# Patient Record
Sex: MALE | Race: OTHER | HISPANIC OR LATINO | URBAN - METROPOLITAN AREA
[De-identification: names, ages, dates, MRNs, and addresses within clinical notes are randomized per-mention and may not be internally consistent; named-entity substitution may affect disease eponyms.]

---

## 2019-07-23 PROBLEM — Z00.00 ENCOUNTER FOR PREVENTIVE HEALTH EXAMINATION: Status: ACTIVE | Noted: 2019-07-23

## 2019-08-08 ENCOUNTER — OUTPATIENT (OUTPATIENT)
Dept: OUTPATIENT SERVICES | Facility: HOSPITAL | Age: 64
LOS: 1 days | Discharge: HOME | End: 2019-08-08

## 2019-08-08 DIAGNOSIS — E78.00 PURE HYPERCHOLESTEROLEMIA, UNSPECIFIED: ICD-10-CM

## 2019-08-08 DIAGNOSIS — E11.9 TYPE 2 DIABETES MELLITUS WITHOUT COMPLICATIONS: ICD-10-CM

## 2019-08-08 DIAGNOSIS — I10 ESSENTIAL (PRIMARY) HYPERTENSION: ICD-10-CM

## 2019-08-08 DIAGNOSIS — I73.9 PERIPHERAL VASCULAR DISEASE, UNSPECIFIED: ICD-10-CM

## 2019-08-13 ENCOUNTER — APPOINTMENT (OUTPATIENT)
Dept: CARDIOLOGY | Facility: CLINIC | Age: 64
End: 2019-08-13
Payer: MEDICAID

## 2019-08-13 VITALS
BODY MASS INDEX: 27.32 KG/M2 | HEIGHT: 66 IN | SYSTOLIC BLOOD PRESSURE: 122 MMHG | WEIGHT: 170 LBS | DIASTOLIC BLOOD PRESSURE: 70 MMHG

## 2019-08-13 PROCEDURE — 99203 OFFICE O/P NEW LOW 30 MIN: CPT

## 2019-08-13 PROCEDURE — 93000 ELECTROCARDIOGRAM COMPLETE: CPT

## 2019-08-13 NOTE — HISTORY OF PRESENT ILLNESS
[FreeTextEntry1] :  pt had acute myocardial infarction in 11/12/2018 was in [place] [facility]\par  pt had lad complete thrombosis and given two stents in proximal lad\par  in March 2019 as lvef was 30% aicd placed\par  pt denies chest pains but c/o severe exertional dyspnoea of less than ablok\par  meds reviewed\par  pt is not able to do any work due to exertional dyspnoea\par

## 2019-08-13 NOTE — PHYSICAL EXAM
[General Appearance - Well Developed] : well developed [Normal Appearance] : normal appearance [Well Groomed] : well groomed [General Appearance - Well Nourished] : well nourished [No Deformities] : no deformities [General Appearance - In No Acute Distress] : no acute distress [Arterial Pulses Normal] : the arterial pulses were normal [Heart Rate And Rhythm] : heart rate and rhythm were normal [Heart Sounds] : normal S1 and S2 [Edema] : no peripheral edema present [Murmurs] : no murmurs present

## 2019-08-13 NOTE — REASON FOR VISIT
[Initial Evaluation] : an initial evaluation of [Heart Failure] : congestive heart failure [Hyperlipidemia] : hyperlipidemia [Coronary Artery Disease] : coronary artery disease

## 2019-08-13 NOTE — REASON FOR VISIT
[Initial Evaluation] : an initial evaluation of [Heart Failure] : congestive heart failure [Coronary Artery Disease] : coronary artery disease [Hyperlipidemia] : hyperlipidemia

## 2019-08-13 NOTE — PHYSICAL EXAM
[General Appearance - Well Developed] : well developed [Normal Appearance] : normal appearance [Well Groomed] : well groomed [General Appearance - Well Nourished] : well nourished [No Deformities] : no deformities [General Appearance - In No Acute Distress] : no acute distress [Arterial Pulses Normal] : the arterial pulses were normal [Heart Sounds] : normal S1 and S2 [Heart Rate And Rhythm] : heart rate and rhythm were normal [Edema] : no peripheral edema present [Murmurs] : no murmurs present

## 2019-08-13 NOTE — HISTORY OF PRESENT ILLNESS
[FreeTextEntry1] :  pt had acute myocardial infarction in 11/12/2018 was in [place] [facility]\par  pt had lad complete thrombosis and given two stents in proximal lad\par  in March 2019 as lvef was 30% aicd placed\par  pt denies chest pains but c/o severe exertional dyspnoea of less than ablok\par  meds reviewed\par  pt is not able to do any work due to exertional dyspnoea\par

## 2019-08-13 NOTE — ASSESSMENT
[FreeTextEntry1] :  pt s/p anterior wall mi and 2 stents in lad\par  s/p aicd for reduced lvef\par  meds reviewed\par  pt has dry cough will d/c lisinopril and start on valsartan 80 mg po daily\par  reduce amlodipine to 5 mg po daily\par  blood work from 8/19 reviewed

## 2019-08-27 ENCOUNTER — APPOINTMENT (OUTPATIENT)
Dept: CARDIOLOGY | Facility: CLINIC | Age: 64
End: 2019-08-27
Payer: MEDICAID

## 2019-08-27 PROCEDURE — 93306 TTE W/DOPPLER COMPLETE: CPT

## 2019-08-29 ENCOUNTER — TRANSCRIPTION ENCOUNTER (OUTPATIENT)
Age: 64
End: 2019-08-29

## 2019-09-14 ENCOUNTER — OUTPATIENT (OUTPATIENT)
Dept: OUTPATIENT SERVICES | Facility: HOSPITAL | Age: 64
LOS: 1 days | Discharge: HOME | End: 2019-09-14

## 2019-09-14 DIAGNOSIS — E61.1 IRON DEFICIENCY: ICD-10-CM

## 2019-09-14 DIAGNOSIS — I10 ESSENTIAL (PRIMARY) HYPERTENSION: ICD-10-CM

## 2019-09-14 DIAGNOSIS — E11.9 TYPE 2 DIABETES MELLITUS WITHOUT COMPLICATIONS: ICD-10-CM

## 2019-09-14 DIAGNOSIS — E55.9 VITAMIN D DEFICIENCY, UNSPECIFIED: ICD-10-CM

## 2019-09-14 DIAGNOSIS — E78.00 PURE HYPERCHOLESTEROLEMIA, UNSPECIFIED: ICD-10-CM

## 2019-09-30 ENCOUNTER — APPOINTMENT (OUTPATIENT)
Dept: UROLOGY | Facility: CLINIC | Age: 64
End: 2019-09-30
Payer: MEDICAID

## 2019-09-30 VITALS
BODY MASS INDEX: 27.64 KG/M2 | WEIGHT: 172 LBS | HEIGHT: 66 IN | SYSTOLIC BLOOD PRESSURE: 117 MMHG | HEART RATE: 55 BPM | DIASTOLIC BLOOD PRESSURE: 82 MMHG

## 2019-09-30 DIAGNOSIS — Z82.49 FAMILY HISTORY OF ISCHEMIC HEART DISEASE AND OTHER DISEASES OF THE CIRCULATORY SYSTEM: ICD-10-CM

## 2019-09-30 DIAGNOSIS — Z83.3 FAMILY HISTORY OF DIABETES MELLITUS: ICD-10-CM

## 2019-09-30 DIAGNOSIS — N50.3 CYST OF EPIDIDYMIS: ICD-10-CM

## 2019-09-30 PROCEDURE — 99203 OFFICE O/P NEW LOW 30 MIN: CPT

## 2019-09-30 NOTE — PHYSICAL EXAM
[General Appearance - Well Developed] : well developed [Normal Appearance] : normal appearance [General Appearance - Well Nourished] : well nourished [Well Groomed] : well groomed [General Appearance - In No Acute Distress] : no acute distress [Edema] : no peripheral edema [Respiration, Rhythm And Depth] : normal respiratory rhythm and effort [Abdomen Soft] : soft [Exaggerated Use Of Accessory Muscles For Inspiration] : no accessory muscle use [Abdomen Tenderness] : non-tender [Costovertebral Angle Tenderness] : no ~M costovertebral angle tenderness [Urethral Meatus] : meatus normal [Testes Mass (___cm)] : there were no testicular masses [FreeTextEntry1] : bilateral scrotal swelling likely hydrocele [Normal Station and Gait] : the gait and station were normal for the patient's age [] : no rash [No Focal Deficits] : no focal deficits [Affect] : the affect was normal [Oriented To Time, Place, And Person] : oriented to person, place, and time [Mood] : the mood was normal [Not Anxious] : not anxious [No Palpable Adenopathy] : no palpable adenopathy

## 2019-09-30 NOTE — ASSESSMENT
[FreeTextEntry1] : This is a 64 year male who presents for evaluation of bilateral hydrocele -- larger on the left - loculated large\par developed after hernia surgery on 2005\par \par Brings scrotal sonogram from [place] from March 2019\par --images reviewed by me\par -- loculated large left hydrocele\par bilateral epdidiymal cysts\par right collection -- epididymal cyst vs hydrocele\par

## 2019-09-30 NOTE — HISTORY OF PRESENT ILLNESS
[FreeTextEntry1] : This is a 64 year male who presents for evaluation of bilateral hydrocele -- larger on the left - loculated large\par developed after hernia surgery on 2005\par \par Brings scrotal sonogram from [place] from March 2019\par --images reviewed by me\par -- loculated large left hydrocele\par bilateral epdidiymal cysts\par right collection -- epididymal cyst vs hydrocele\par \par Nov 2018--\par two cardiac stents

## 2019-11-15 ENCOUNTER — APPOINTMENT (OUTPATIENT)
Dept: CARDIOLOGY | Facility: CLINIC | Age: 64
End: 2019-11-15
Payer: MEDICAID

## 2019-11-15 VITALS
HEIGHT: 66 IN | WEIGHT: 180 LBS | DIASTOLIC BLOOD PRESSURE: 65 MMHG | SYSTOLIC BLOOD PRESSURE: 123 MMHG | BODY MASS INDEX: 28.93 KG/M2

## 2019-11-15 DIAGNOSIS — I24.8 OTHER FORMS OF ACUTE ISCHEMIC HEART DISEASE: ICD-10-CM

## 2019-11-15 PROCEDURE — 99213 OFFICE O/P EST LOW 20 MIN: CPT

## 2019-11-15 PROCEDURE — 93000 ELECTROCARDIOGRAM COMPLETE: CPT

## 2019-11-15 NOTE — ASSESSMENT
[FreeTextEntry1] :  e c h o showed 34 % lvef\par  pt had aicd check by DR Loja his report noted\par  no anginal sx\par  mild exertional dyspnoea \par  meds reviewed\par  e k g nsr no arrhythmias noted no new st changes een\par  blood work done by pmd

## 2019-11-15 NOTE — PHYSICAL EXAM
[General Appearance - Well Developed] : well developed [Normal Appearance] : normal appearance [Well Groomed] : well groomed [General Appearance - Well Nourished] : well nourished [No Deformities] : no deformities [General Appearance - In No Acute Distress] : no acute distress [Heart Rate And Rhythm] : heart rate and rhythm were normal [Heart Sounds] : normal S1 and S2 [Murmurs] : no murmurs present

## 2019-12-05 ENCOUNTER — OUTPATIENT (OUTPATIENT)
Dept: OUTPATIENT SERVICES | Facility: HOSPITAL | Age: 64
LOS: 1 days | Discharge: HOME | End: 2019-12-05

## 2019-12-05 DIAGNOSIS — E61.1 IRON DEFICIENCY: ICD-10-CM

## 2019-12-05 DIAGNOSIS — E78.00 PURE HYPERCHOLESTEROLEMIA, UNSPECIFIED: ICD-10-CM

## 2019-12-05 DIAGNOSIS — E55.9 VITAMIN D DEFICIENCY, UNSPECIFIED: ICD-10-CM

## 2019-12-05 DIAGNOSIS — E11.9 TYPE 2 DIABETES MELLITUS WITHOUT COMPLICATIONS: ICD-10-CM

## 2019-12-05 DIAGNOSIS — H90.5 UNSPECIFIED SENSORINEURAL HEARING LOSS: ICD-10-CM

## 2019-12-05 DIAGNOSIS — R42 DIZZINESS AND GIDDINESS: ICD-10-CM

## 2019-12-05 DIAGNOSIS — I10 ESSENTIAL (PRIMARY) HYPERTENSION: ICD-10-CM

## 2020-01-07 ENCOUNTER — APPOINTMENT (OUTPATIENT)
Dept: UROLOGY | Facility: CLINIC | Age: 65
End: 2020-01-07
Payer: MEDICAID

## 2020-01-07 PROCEDURE — 99214 OFFICE O/P EST MOD 30 MIN: CPT

## 2020-01-17 ENCOUNTER — MESSAGE (OUTPATIENT)
Age: 65
End: 2020-01-17

## 2020-01-30 ENCOUNTER — APPOINTMENT (OUTPATIENT)
Dept: UROLOGY | Facility: CLINIC | Age: 65
End: 2020-01-30
Payer: COMMERCIAL

## 2020-01-30 VITALS — BODY MASS INDEX: 28.93 KG/M2 | WEIGHT: 180 LBS | HEIGHT: 66 IN

## 2020-01-30 LAB
BILIRUB UR QL STRIP: NORMAL
CLARITY UR: CLEAR
COLLECTION METHOD: NORMAL
GLUCOSE UR-MCNC: NORMAL
HCG UR QL: NORMAL EU/DL
HGB UR QL STRIP.AUTO: NORMAL
KETONES UR-MCNC: NORMAL
LEUKOCYTE ESTERASE UR QL STRIP: NORMAL
NITRITE UR QL STRIP: NORMAL
PH UR STRIP: 5
PROT UR STRIP-MCNC: NORMAL
SP GR UR STRIP: 1.01

## 2020-01-30 PROCEDURE — 81003 URINALYSIS AUTO W/O SCOPE: CPT | Mod: QW

## 2020-01-30 PROCEDURE — 99214 OFFICE O/P EST MOD 30 MIN: CPT

## 2020-02-03 LAB — BACTERIA UR CULT: NORMAL

## 2020-02-06 DIAGNOSIS — R30.0 DYSURIA: ICD-10-CM

## 2020-02-12 ENCOUNTER — LABORATORY RESULT (OUTPATIENT)
Age: 65
End: 2020-02-12

## 2020-02-18 ENCOUNTER — LABORATORY RESULT (OUTPATIENT)
Age: 65
End: 2020-02-18

## 2020-02-18 ENCOUNTER — APPOINTMENT (OUTPATIENT)
Dept: UROLOGY | Facility: CLINIC | Age: 65
End: 2020-02-18
Payer: COMMERCIAL

## 2020-02-18 PROCEDURE — 76872 US TRANSRECTAL: CPT

## 2020-02-18 PROCEDURE — 93975 VASCULAR STUDY: CPT

## 2020-02-18 PROCEDURE — 55700: CPT

## 2020-02-28 ENCOUNTER — APPOINTMENT (OUTPATIENT)
Dept: UROLOGY | Facility: CLINIC | Age: 65
End: 2020-02-28
Payer: COMMERCIAL

## 2020-02-28 PROCEDURE — 99213 OFFICE O/P EST LOW 20 MIN: CPT

## 2020-03-27 ENCOUNTER — APPOINTMENT (OUTPATIENT)
Dept: CARDIOLOGY | Facility: CLINIC | Age: 65
End: 2020-03-27

## 2020-05-12 ENCOUNTER — APPOINTMENT (OUTPATIENT)
Dept: CARDIOLOGY | Facility: CLINIC | Age: 65
End: 2020-05-12

## 2020-06-10 ENCOUNTER — RX RENEWAL (OUTPATIENT)
Age: 65
End: 2020-06-10

## 2020-07-06 ENCOUNTER — FORM ENCOUNTER (OUTPATIENT)
Age: 65
End: 2020-07-06

## 2020-09-08 ENCOUNTER — APPOINTMENT (OUTPATIENT)
Dept: UROLOGY | Facility: CLINIC | Age: 65
End: 2020-09-08
Payer: COMMERCIAL

## 2020-09-08 VITALS
BODY MASS INDEX: 28.93 KG/M2 | SYSTOLIC BLOOD PRESSURE: 110 MMHG | HEART RATE: 62 BPM | HEIGHT: 66 IN | WEIGHT: 180 LBS | TEMPERATURE: 98.2 F | DIASTOLIC BLOOD PRESSURE: 76 MMHG

## 2020-09-08 PROCEDURE — 81003 URINALYSIS AUTO W/O SCOPE: CPT | Mod: QW

## 2020-09-08 PROCEDURE — 99213 OFFICE O/P EST LOW 20 MIN: CPT

## 2020-10-01 NOTE — PHYSICAL EXAM
[General Appearance - Well Developed] : well developed [General Appearance - Well Nourished] : well nourished [Well Groomed] : well groomed [Normal Appearance] : normal appearance [General Appearance - In No Acute Distress] : no acute distress [Abdomen Soft] : soft [Abdomen Tenderness] : non-tender [Costovertebral Angle Tenderness] : no ~M costovertebral angle tenderness [Urethral Meatus] : meatus normal [Penis Abnormality] : normal uncircumcised penis [Anus Abnormality] : the anus and perineum were normal [Rectal Exam - Rectum] : digital rectal exam was normal [Prostate Tenderness] : the prostate was not tender [Prostate Size ___ gm] : prostate size [unfilled] gm [No Prostate Nodules] : no prostate nodules [Skin Color & Pigmentation] : normal skin color and pigmentation [Edema] : no peripheral edema [Respiration, Rhythm And Depth] : normal respiratory rhythm and effort [] : no respiratory distress [Exaggerated Use Of Accessory Muscles For Inspiration] : no accessory muscle use [Oriented To Time, Place, And Person] : oriented to person, place, and time [Mood] : the mood was normal [Affect] : the affect was normal [Not Anxious] : not anxious [Normal Station and Gait] : the gait and station were normal for the patient's age [No Focal Deficits] : no focal deficits [No Palpable Adenopathy] : no palpable adenopathy [FreeTextEntry1] : large right hydrocele, very large left hydrocele- extends to ext ing ring-testis not palpable //sulcus +, smooth (from last visit)

## 2020-10-01 NOTE — HISTORY OF PRESENT ILLNESS
[Nocturia] : nocturia [Urinary Frequency] : urinary frequency [None] : None [FreeTextEntry1] : 65  y.o male with a h/o elevated PSA\par s/p Prostate Biopsy 2/18/20  == BPH  , chronic inflammation \par doing ok\par otherwise denies fevers or constitutional  symptoms \par 2/ 20 TRUS =  89 gm /no nodules // nonvasc.\par 1/20 psa =  9.1 %free = 26 \par 8/ 20 psa = 9.2   %bruno = 24   PSAD = .10\par \par \par  [Straining] : no straining [Weak Stream] : no weak stream [Hematuria - Gross] : no gross hematuria [Fever] : no fever [Anorexia] : no anorexia

## 2020-10-01 NOTE — ASSESSMENT
[FreeTextEntry1] : 1. elevated PSA with nl %free-  s/p neg prostate biopsy 2/2020\par 2. BPH \par 3. Large left hydrocele , mod right hydrocele\par \par \par -repeat PSA in 6 months with office visit ******** April 2021

## 2020-11-09 ENCOUNTER — FORM ENCOUNTER (OUTPATIENT)
Age: 65
End: 2020-11-09

## 2020-11-11 ENCOUNTER — OUTPATIENT (OUTPATIENT)
Dept: OUTPATIENT SERVICES | Facility: HOSPITAL | Age: 65
LOS: 1 days | Discharge: HOME | End: 2020-11-11

## 2020-11-11 DIAGNOSIS — K02.53 DENTAL CARIES ON PIT AND FISSURE SURFACE PENETRATING INTO PULP: ICD-10-CM

## 2021-01-20 ENCOUNTER — FORM ENCOUNTER (OUTPATIENT)
Age: 66
End: 2021-01-20

## 2021-02-14 ENCOUNTER — EMERGENCY (EMERGENCY)
Facility: HOSPITAL | Age: 66
LOS: 0 days | Discharge: HOME | End: 2021-02-14
Attending: EMERGENCY MEDICINE | Admitting: EMERGENCY MEDICINE
Payer: MEDICAID

## 2021-02-14 VITALS
HEIGHT: 65 IN | SYSTOLIC BLOOD PRESSURE: 138 MMHG | WEIGHT: 177.03 LBS | DIASTOLIC BLOOD PRESSURE: 83 MMHG | TEMPERATURE: 99 F | RESPIRATION RATE: 18 BRPM | OXYGEN SATURATION: 98 % | HEART RATE: 76 BPM

## 2021-02-14 DIAGNOSIS — R42 DIZZINESS AND GIDDINESS: ICD-10-CM

## 2021-02-14 LAB
ANION GAP SERPL CALC-SCNC: 15 MMOL/L — HIGH (ref 7–14)
BASOPHILS # BLD AUTO: 0.09 K/UL — SIGNIFICANT CHANGE UP (ref 0–0.2)
BASOPHILS NFR BLD AUTO: 0.9 % — SIGNIFICANT CHANGE UP (ref 0–1)
BUN SERPL-MCNC: 34 MG/DL — HIGH (ref 10–20)
CALCIUM SERPL-MCNC: 9.4 MG/DL — SIGNIFICANT CHANGE UP (ref 8.5–10.1)
CHLORIDE SERPL-SCNC: 103 MMOL/L — SIGNIFICANT CHANGE UP (ref 98–110)
CO2 SERPL-SCNC: 22 MMOL/L — SIGNIFICANT CHANGE UP (ref 17–32)
CREAT SERPL-MCNC: 1.6 MG/DL — HIGH (ref 0.7–1.5)
EOSINOPHIL # BLD AUTO: 0.23 K/UL — SIGNIFICANT CHANGE UP (ref 0–0.7)
EOSINOPHIL NFR BLD AUTO: 2.3 % — SIGNIFICANT CHANGE UP (ref 0–8)
GLUCOSE SERPL-MCNC: 173 MG/DL — HIGH (ref 70–99)
HCT VFR BLD CALC: 43.7 % — SIGNIFICANT CHANGE UP (ref 42–52)
HGB BLD-MCNC: 14.6 G/DL — SIGNIFICANT CHANGE UP (ref 14–18)
IMM GRANULOCYTES NFR BLD AUTO: 0.3 % — SIGNIFICANT CHANGE UP (ref 0.1–0.3)
LYMPHOCYTES # BLD AUTO: 1.17 K/UL — LOW (ref 1.2–3.4)
LYMPHOCYTES # BLD AUTO: 11.5 % — LOW (ref 20.5–51.1)
MCHC RBC-ENTMCNC: 29.4 PG — SIGNIFICANT CHANGE UP (ref 27–31)
MCHC RBC-ENTMCNC: 33.4 G/DL — SIGNIFICANT CHANGE UP (ref 32–37)
MCV RBC AUTO: 88.1 FL — SIGNIFICANT CHANGE UP (ref 80–94)
MONOCYTES # BLD AUTO: 0.44 K/UL — SIGNIFICANT CHANGE UP (ref 0.1–0.6)
MONOCYTES NFR BLD AUTO: 4.3 % — SIGNIFICANT CHANGE UP (ref 1.7–9.3)
NEUTROPHILS # BLD AUTO: 8.2 K/UL — HIGH (ref 1.4–6.5)
NEUTROPHILS NFR BLD AUTO: 80.7 % — HIGH (ref 42.2–75.2)
NRBC # BLD: 0 /100 WBCS — SIGNIFICANT CHANGE UP (ref 0–0)
PLATELET # BLD AUTO: 268 K/UL — SIGNIFICANT CHANGE UP (ref 130–400)
POTASSIUM SERPL-MCNC: 4.3 MMOL/L — SIGNIFICANT CHANGE UP (ref 3.5–5)
POTASSIUM SERPL-SCNC: 4.3 MMOL/L — SIGNIFICANT CHANGE UP (ref 3.5–5)
RBC # BLD: 4.96 M/UL — SIGNIFICANT CHANGE UP (ref 4.7–6.1)
RBC # FLD: 14.2 % — SIGNIFICANT CHANGE UP (ref 11.5–14.5)
SODIUM SERPL-SCNC: 140 MMOL/L — SIGNIFICANT CHANGE UP (ref 135–146)
WBC # BLD: 10.16 K/UL — SIGNIFICANT CHANGE UP (ref 4.8–10.8)
WBC # FLD AUTO: 10.16 K/UL — SIGNIFICANT CHANGE UP (ref 4.8–10.8)

## 2021-02-14 PROCEDURE — 99285 EMERGENCY DEPT VISIT HI MDM: CPT

## 2021-02-14 PROCEDURE — 70450 CT HEAD/BRAIN W/O DYE: CPT | Mod: 26

## 2021-02-14 PROCEDURE — 93010 ELECTROCARDIOGRAM REPORT: CPT

## 2021-02-14 RX ORDER — MECLIZINE HCL 12.5 MG
1 TABLET ORAL
Qty: 21 | Refills: 0
Start: 2021-02-14 | End: 2021-02-20

## 2021-02-14 RX ORDER — MECLIZINE HCL 12.5 MG
50 TABLET ORAL ONCE
Refills: 0 | Status: COMPLETED | OUTPATIENT
Start: 2021-02-14 | End: 2021-02-14

## 2021-02-14 RX ADMIN — Medication 50 MILLIGRAM(S): at 14:26

## 2021-02-14 NOTE — ED ADULT NURSE NOTE - OBJECTIVE STATEMENT
patient presents to the ed with complaints of dizziness that started a few days ago. pt denies blurry vision. states couldn't take feeling this way anymoer today

## 2021-02-14 NOTE — ED PROVIDER NOTE - CLINICAL SUMMARY MEDICAL DECISION MAKING FREE TEXT BOX
pt here for eval of dizzyness. sx's often worse with position. CT Head done to exclude acute intracranial pathology. ct wnl. pt well appearing, normal neuro exam. given outpt f/u and meclizine. will dc

## 2021-02-14 NOTE — ED PROVIDER NOTE - PROGRESS NOTE DETAILS
ATTENDING NOTE:   66 yo M PMH CAD, DM here for eval of dizziness x2 days. Reports it usually happens when he gets up. No weakness or numbness. No ear pain. No other sx.  On exam: CON: ao x 3, HENMT: clear oropharynx,  neck supple,  CV: rrr, equal pulses b/l, RESP: cta b/l, GI:  soft, nontender, no rebound, no guarding, SKIN: no rash, MSK: no deformities, NEURO: no gross motor or sensory deficit Psychiatric: appropriate mood, appropriate affect, PERRL, EOMI. Normal gait.CN II-XII intact. 5/5 strength sensation intact. Finger to nose normal.    I&P: Dizziness, however, normal exam. [place] obtain head CT and labs pt without sx's walked to bathroom. Pt reports that when he walked  he turned his head and felt dizzy briefly. will dc on meclizine. ATTENDING NOTE:   64 yo M PMH CAD, DM here for eval of dizziness x2 days. Reports it usually happens when he gets up. No weakness or numbness. No ear pain. No other sx.  On exam: CON: ao x 3, HENMT:  neck supple,  CV: rrr, equal pulses b/l, RESP: cta b/l, GI:  soft, nontender, no rebound, no guarding, SKIN: no rash, MSK: no deformities, NEURO: no gross motor or sensory deficit Psychiatric: appropriate mood, appropriate affect, PERRL, EOMI. Normal gait.CN II-XII intact. 5/5 strength sensation intact. Finger to nose normal.    I&P: Dizziness, however, normal exam. [place] obtain head CT and labs

## 2021-02-14 NOTE — ED PROVIDER NOTE - PHYSICAL EXAMINATION
CONST: Well appearing in NAD  EYES: PERRL, EOMI, Sclera and conjunctiva clear.   ENT: No nasal discharge. TM's clear B/L without drainage. Oropharynx normal appearing, no erythema or exudates. Uvula midline.  NECK: Non-tender, no meningeal signs  CARD: Normal S1 S2; Normal rate and rhythm  RESP: Equal BS B/L, No wheezes, rhonchi or rales. No distress  GI: Soft, non-tender, non-distended.  MS: Normal ROM in all extremities. No midline spinal tenderness.  SKIN: Warm, dry, no acute rashes. Good turgor  NEURO: A&Ox3, No focal deficits. Strength 5/5 with no sensory deficits. Steady gait. no nystagmus. neg Romberg, Neg supinator drift. no dysmetria

## 2021-02-14 NOTE — ED PROVIDER NOTE - PATIENT PORTAL LINK FT
You can access the FollowMyHealth Patient Portal offered by [facility] by registering at the following website: [email or web address]. By joining [facility]’s FollowMyHealth portal, you will also be able to view your health information using other applications (apps) compatible with our system.

## 2021-02-14 NOTE — ED ADULT TRIAGE NOTE - CHIEF COMPLAINT QUOTE
pt states the past 2 days he has been dizzy and nauseous. states it is worse when he bends down or rolls over in bed.

## 2021-02-14 NOTE — ED ADULT NURSE NOTE - NS ED NOTE ABUSE RESPONSE YN
Airway  Urgency: elective    Date/Time: 12/30/2020 10:15 AM  Airway not difficult    General Information and Staff    Patient location during procedure: OR    Indications and Patient Condition  Indications for airway management: airway protection    Preoxygenated: yes  MILS maintained throughout  Mask difficulty assessment: 1 - vent by mask    Final Airway Details  Final airway type: endotracheal airway      Successful airway: ETT  Cuffed: yes   Successful intubation technique: direct laryngoscopy  Endotracheal tube insertion site: oral  Blade: Ashley  Blade size: 3  ETT size (mm): 7.0  Cormack-Lehane Classification: grade I - full view of glottis  Placement verified by: chest auscultation and capnometry   Measured from: lips  ETT/EBT  to lips (cm): 20  Number of attempts at approach: 1  Assessment: lips, teeth, and gum same as pre-op and atraumatic intubation               Yes

## 2021-02-14 NOTE — ED PROVIDER NOTE - OBJECTIVE STATEMENT
dizziness x 1 day. Room spinning. worse with change in position. Assoc with nausea. Denies HA, tinnitus, ear pain, hearing loss, CP, SOB, abd pain fever, diarrhea. Pt has a hx of CAD with stents and DM.

## 2021-02-14 NOTE — ED PROVIDER NOTE - NS ED ROS FT
CONST: No fever, chills or bodyaches  EYES: No pain, redness, drainage or visual changes.  ENT: No ear pain or discharge, nasal discharge or congestion. No sore throat  CARD: No chest pain, palpitations  RESP: No SOB, cough, hemoptysis. No hx of asthma or COPD  GI: No abdominal pain  MS: No joint pain, back pain or extremity pain/injury  SKIN: No rashes  NEURO: No headache, (+)dizziness, No paresthesias or LOC  : No dysuria

## 2021-02-14 NOTE — ED ADULT NURSE REASSESSMENT NOTE - NS ED NURSE REASSESS COMMENT FT1
trulicity 1.5mg/0.5ml pen 1 a week, valsartan 80mg 1 a day, furosemide 40mg 1 a day, coreg 12.5mg twice a day, asa 81mg one a day, ezetimibe 10mg 1 a day, alloipurinol 100mg twice a day, atorvastatin 80mg 1 a day. heart attach 11/2018

## 2021-03-01 ENCOUNTER — OUTPATIENT (OUTPATIENT)
Dept: OUTPATIENT SERVICES | Facility: HOSPITAL | Age: 66
LOS: 1 days | Discharge: HOME | End: 2021-03-01

## 2021-03-02 DIAGNOSIS — K02.63 DENTAL CARIES ON SMOOTH SURFACE PENETRATING INTO PULP: ICD-10-CM

## 2021-03-25 ENCOUNTER — FORM ENCOUNTER (OUTPATIENT)
Age: 66
End: 2021-03-25

## 2021-04-07 ENCOUNTER — OUTPATIENT (OUTPATIENT)
Dept: OUTPATIENT SERVICES | Facility: HOSPITAL | Age: 66
LOS: 1 days | Discharge: HOME | End: 2021-04-07

## 2021-04-15 ENCOUNTER — APPOINTMENT (OUTPATIENT)
Dept: UROLOGY | Facility: CLINIC | Age: 66
End: 2021-04-15
Payer: COMMERCIAL

## 2021-04-15 VITALS — HEIGHT: 66 IN | WEIGHT: 180 LBS | BODY MASS INDEX: 28.93 KG/M2 | TEMPERATURE: 98 F

## 2021-04-15 PROCEDURE — 99214 OFFICE O/P EST MOD 30 MIN: CPT

## 2021-04-15 PROCEDURE — 99072 ADDL SUPL MATRL&STAF TM PHE: CPT

## 2021-05-17 ENCOUNTER — FORM ENCOUNTER (OUTPATIENT)
Age: 66
End: 2021-05-17

## 2021-06-10 ENCOUNTER — FORM ENCOUNTER (OUTPATIENT)
Age: 66
End: 2021-06-10

## 2021-06-20 ENCOUNTER — FORM ENCOUNTER (OUTPATIENT)
Age: 66
End: 2021-06-20

## 2021-12-09 ENCOUNTER — APPOINTMENT (OUTPATIENT)
Dept: UROLOGY | Facility: CLINIC | Age: 66
End: 2021-12-09
Payer: COMMERCIAL

## 2021-12-09 PROCEDURE — 99213 OFFICE O/P EST LOW 20 MIN: CPT

## 2021-12-19 ENCOUNTER — FORM ENCOUNTER (OUTPATIENT)
Age: 66
End: 2021-12-19

## 2022-03-21 ENCOUNTER — NON-APPOINTMENT (OUTPATIENT)
Age: 67
End: 2022-03-21

## 2022-03-22 ENCOUNTER — APPOINTMENT (OUTPATIENT)
Dept: UROLOGY | Facility: CLINIC | Age: 67
End: 2022-03-22
Payer: MEDICARE

## 2022-03-22 VITALS — WEIGHT: 180 LBS | HEIGHT: 66 IN | BODY MASS INDEX: 28.93 KG/M2

## 2022-03-22 LAB
BILIRUB UR QL STRIP: NORMAL
COLLECTION METHOD: NORMAL
GLUCOSE UR-MCNC: NORMAL
HCG UR QL: 0.2 EU/DL
HGB UR QL STRIP.AUTO: NORMAL
KETONES UR-MCNC: NORMAL
LEUKOCYTE ESTERASE UR QL STRIP: NORMAL
NITRITE UR QL STRIP: NORMAL
PH UR STRIP: 5.5
PROT UR STRIP-MCNC: NORMAL
SP GR UR STRIP: 1.03

## 2022-03-22 PROCEDURE — 99214 OFFICE O/P EST MOD 30 MIN: CPT

## 2022-03-22 PROCEDURE — 81003 URINALYSIS AUTO W/O SCOPE: CPT | Mod: QW

## 2022-05-24 ENCOUNTER — OUTPATIENT (OUTPATIENT)
Dept: OUTPATIENT SERVICES | Facility: HOSPITAL | Age: 67
LOS: 1 days | Discharge: HOME | End: 2022-05-24
Payer: MEDICARE

## 2022-05-24 ENCOUNTER — RESULT REVIEW (OUTPATIENT)
Age: 67
End: 2022-05-24

## 2022-05-24 DIAGNOSIS — R97.20 ELEVATED PROSTATE SPECIFIC ANTIGEN [PSA]: ICD-10-CM

## 2022-05-24 PROCEDURE — 72197 MRI PELVIS W/O & W/DYE: CPT | Mod: 26

## 2022-06-07 ENCOUNTER — APPOINTMENT (OUTPATIENT)
Dept: UROLOGY | Facility: CLINIC | Age: 67
End: 2022-06-07
Payer: MEDICARE

## 2022-06-07 VITALS — HEIGHT: 65 IN | BODY MASS INDEX: 29.16 KG/M2 | WEIGHT: 175 LBS

## 2022-06-07 PROCEDURE — 99213 OFFICE O/P EST LOW 20 MIN: CPT

## 2022-06-07 PROCEDURE — 81003 URINALYSIS AUTO W/O SCOPE: CPT | Mod: QW

## 2022-06-26 ENCOUNTER — FORM ENCOUNTER (OUTPATIENT)
Age: 67
End: 2022-06-26

## 2022-06-27 VITALS
OXYGEN SATURATION: 98 % | DIASTOLIC BLOOD PRESSURE: 70 MMHG | WEIGHT: 175 LBS | HEART RATE: 70 BPM | BODY MASS INDEX: 29.16 KG/M2 | SYSTOLIC BLOOD PRESSURE: 120 MMHG | RESPIRATION RATE: 15 BRPM | HEIGHT: 65 IN

## 2022-08-31 ENCOUNTER — NON-APPOINTMENT (OUTPATIENT)
Age: 67
End: 2022-08-31

## 2022-09-08 ENCOUNTER — APPOINTMENT (OUTPATIENT)
Dept: UROLOGY | Facility: CLINIC | Age: 67
End: 2022-09-08

## 2022-09-08 VITALS — WEIGHT: 178 LBS | BODY MASS INDEX: 29.66 KG/M2 | HEIGHT: 65 IN

## 2022-09-08 PROCEDURE — 99213 OFFICE O/P EST LOW 20 MIN: CPT

## 2022-09-15 ENCOUNTER — NON-APPOINTMENT (OUTPATIENT)
Age: 67
End: 2022-09-15

## 2022-12-20 ENCOUNTER — APPOINTMENT (OUTPATIENT)
Dept: CARDIOLOGY | Facility: CLINIC | Age: 67
End: 2022-12-20

## 2022-12-20 ENCOUNTER — APPOINTMENT (OUTPATIENT)
Dept: CARDIOLOGY | Facility: CLINIC | Age: 67
End: 2022-12-20
Payer: MEDICARE

## 2022-12-20 VITALS
WEIGHT: 184 LBS | BODY MASS INDEX: 30.66 KG/M2 | DIASTOLIC BLOOD PRESSURE: 78 MMHG | SYSTOLIC BLOOD PRESSURE: 138 MMHG | HEIGHT: 65 IN

## 2022-12-20 PROCEDURE — 93325 DOPPLER ECHO COLOR FLOW MAPG: CPT

## 2022-12-20 PROCEDURE — 93320 DOPPLER ECHO COMPLETE: CPT

## 2022-12-20 PROCEDURE — 93351 STRESS TTE COMPLETE: CPT

## 2022-12-20 PROCEDURE — 99214 OFFICE O/P EST MOD 30 MIN: CPT

## 2022-12-21 NOTE — HISTORY OF PRESENT ILLNESS
[FreeTextEntry1] : PARUL LEAHY is a 67 year-old male, with a PMHx significant for CAD s/p PCI of the LAD with late presentation MI and reduced LVEF, s/p AICD, HTN, HLD, and DM, who presents today for a stress echocardiogram. Patient was last seen on 6/27/2022.\par \par Stress echocardiogram was performed today. The patient exercised for one minute and 10 seconds and reached target HR. Test was terminated due to fatigue. Fixed and akinetic apex and anterior wall. Did not augment with stress with inferior inferolateral. Posterior lateral walls improve with exercise. EF was 30-35%; improved to 40% with exercise. No arrhythmias noted. There was a run of atrial bigeminy/atrial tachycardia which resolved spontaneously during recovery. There is no evidence of ventricular tachycardia or ventricular fibrillation. \par

## 2022-12-21 NOTE — DISCUSSION/SUMMARY
[FreeTextEntry1] : Stress echocardiogram was performed today. The patient exercised for one minute and 10 seconds and reached target HR. Test was terminated due to fatigue. Fixed and akinetic apex and anterior wall. Did not augment with stress with inferior inferolateral. Posterior lateral walls improve with exercise. EF was 30-35%; improved to 40% with exercise. No arrhythmias noted. There was a run of atrial bigeminy/atrial tachycardia which resolved spontaneously during recovery. There is no evidence of ventricular tachycardia or ventricular fibrillation. \par \par NYHA Class 2. Currently medically optimized. \par \par CAD: The impression is coronary artery disease. Currently, the condition is stable. There are no changes in medication management. Continue current medical therapy.\par \par HTN: The impression is hypertension. Currently, the condition is stable. There are no changes in medication management. Continue current medical therapy. Other planned treatments include an exercise regimen, weight loss, low sodium diet, and alcohol moderation.\par \par HLD: The impression is hyperlipidemia. BPs have been well controlled. There are no changes in medication management. Continue current medical therapy. Other planned treatment includes diet modification, exercise, and weight loss.\par \par DM: The impression is diabetes. There are no changes in medication management. Patient advised to continue taking medications as prescribed, closely monitor blood sugar at home, follow a diabetic diet, and follow up for continued monitoring.\par \par Instructed to follow up in 3-6 months. \par Plan was discussed with the patient.

## 2023-02-25 ENCOUNTER — INPATIENT (INPATIENT)
Facility: HOSPITAL | Age: 68
LOS: 10 days | Discharge: HOME HEALTH | DRG: 226 | End: 2023-03-08
Attending: HOSPITALIST | Admitting: HOSPITALIST
Payer: MEDICARE

## 2023-02-25 VITALS — RESPIRATION RATE: 18 BRPM | OXYGEN SATURATION: 98 % | HEART RATE: 80 BPM | TEMPERATURE: 98 F

## 2023-02-25 DIAGNOSIS — L02.213 CUTANEOUS ABSCESS OF CHEST WALL: ICD-10-CM

## 2023-02-25 LAB
ALBUMIN SERPL ELPH-MCNC: 4.4 G/DL — SIGNIFICANT CHANGE UP (ref 3.5–5.2)
ALP SERPL-CCNC: 151 U/L — HIGH (ref 30–115)
ALT FLD-CCNC: 38 U/L — SIGNIFICANT CHANGE UP (ref 0–41)
ANION GAP SERPL CALC-SCNC: 11 MMOL/L — SIGNIFICANT CHANGE UP (ref 7–14)
AST SERPL-CCNC: 30 U/L — SIGNIFICANT CHANGE UP (ref 0–41)
BASOPHILS # BLD AUTO: 0.15 K/UL — SIGNIFICANT CHANGE UP (ref 0–0.2)
BASOPHILS NFR BLD AUTO: 1.4 % — HIGH (ref 0–1)
BILIRUB SERPL-MCNC: 0.6 MG/DL — SIGNIFICANT CHANGE UP (ref 0.2–1.2)
BUN SERPL-MCNC: 29 MG/DL — HIGH (ref 10–20)
CALCIUM SERPL-MCNC: 9.5 MG/DL — SIGNIFICANT CHANGE UP (ref 8.4–10.4)
CHLORIDE SERPL-SCNC: 101 MMOL/L — SIGNIFICANT CHANGE UP (ref 98–110)
CO2 SERPL-SCNC: 26 MMOL/L — SIGNIFICANT CHANGE UP (ref 17–32)
CREAT SERPL-MCNC: 1.3 MG/DL — SIGNIFICANT CHANGE UP (ref 0.7–1.5)
EGFR: 61 ML/MIN/1.73M2 — SIGNIFICANT CHANGE UP
EOSINOPHIL # BLD AUTO: 0.53 K/UL — SIGNIFICANT CHANGE UP (ref 0–0.7)
EOSINOPHIL NFR BLD AUTO: 5.1 % — SIGNIFICANT CHANGE UP (ref 0–8)
GLUCOSE BLDC GLUCOMTR-MCNC: 158 MG/DL — HIGH (ref 70–99)
GLUCOSE SERPL-MCNC: 146 MG/DL — HIGH (ref 70–99)
HCT VFR BLD CALC: 44.5 % — SIGNIFICANT CHANGE UP (ref 42–52)
HGB BLD-MCNC: 14.8 G/DL — SIGNIFICANT CHANGE UP (ref 14–18)
IMM GRANULOCYTES NFR BLD AUTO: 0.2 % — SIGNIFICANT CHANGE UP (ref 0.1–0.3)
LACTATE SERPL-SCNC: 0.9 MMOL/L — SIGNIFICANT CHANGE UP (ref 0.7–2)
LYMPHOCYTES # BLD AUTO: 1.78 K/UL — SIGNIFICANT CHANGE UP (ref 1.2–3.4)
LYMPHOCYTES # BLD AUTO: 17 % — LOW (ref 20.5–51.1)
MCHC RBC-ENTMCNC: 29.8 PG — SIGNIFICANT CHANGE UP (ref 27–31)
MCHC RBC-ENTMCNC: 33.3 G/DL — SIGNIFICANT CHANGE UP (ref 32–37)
MCV RBC AUTO: 89.5 FL — SIGNIFICANT CHANGE UP (ref 80–94)
MONOCYTES # BLD AUTO: 0.75 K/UL — HIGH (ref 0.1–0.6)
MONOCYTES NFR BLD AUTO: 7.2 % — SIGNIFICANT CHANGE UP (ref 1.7–9.3)
NEUTROPHILS # BLD AUTO: 7.22 K/UL — HIGH (ref 1.4–6.5)
NEUTROPHILS NFR BLD AUTO: 69.1 % — SIGNIFICANT CHANGE UP (ref 42.2–75.2)
NRBC # BLD: 0 /100 WBCS — SIGNIFICANT CHANGE UP (ref 0–0)
PLATELET # BLD AUTO: 266 K/UL — SIGNIFICANT CHANGE UP (ref 130–400)
POTASSIUM SERPL-MCNC: 4.3 MMOL/L — SIGNIFICANT CHANGE UP (ref 3.5–5)
POTASSIUM SERPL-SCNC: 4.3 MMOL/L — SIGNIFICANT CHANGE UP (ref 3.5–5)
PROT SERPL-MCNC: 7 G/DL — SIGNIFICANT CHANGE UP (ref 6–8)
RBC # BLD: 4.97 M/UL — SIGNIFICANT CHANGE UP (ref 4.7–6.1)
RBC # FLD: 14.4 % — SIGNIFICANT CHANGE UP (ref 11.5–14.5)
SARS-COV-2 RNA SPEC QL NAA+PROBE: DETECTED
SODIUM SERPL-SCNC: 138 MMOL/L — SIGNIFICANT CHANGE UP (ref 135–146)
WBC # BLD: 10.45 K/UL — SIGNIFICANT CHANGE UP (ref 4.8–10.8)
WBC # FLD AUTO: 10.45 K/UL — SIGNIFICANT CHANGE UP (ref 4.8–10.8)

## 2023-02-25 PROCEDURE — 93282 PRGRMG EVAL IMPLANTABLE DFB: CPT

## 2023-02-25 PROCEDURE — 87186 SC STD MICRODIL/AGAR DIL: CPT

## 2023-02-25 PROCEDURE — 80053 COMPREHEN METABOLIC PANEL: CPT

## 2023-02-25 PROCEDURE — C1894: CPT

## 2023-02-25 PROCEDURE — 87040 BLOOD CULTURE FOR BACTERIA: CPT

## 2023-02-25 PROCEDURE — 80202 ASSAY OF VANCOMYCIN: CPT

## 2023-02-25 PROCEDURE — C1773: CPT

## 2023-02-25 PROCEDURE — 93306 TTE W/DOPPLER COMPLETE: CPT

## 2023-02-25 PROCEDURE — 86850 RBC ANTIBODY SCREEN: CPT

## 2023-02-25 PROCEDURE — 88304 TISSUE EXAM BY PATHOLOGIST: CPT

## 2023-02-25 PROCEDURE — 71260 CT THORAX DX C+: CPT | Mod: 26,MA

## 2023-02-25 PROCEDURE — 86901 BLOOD TYPING SEROLOGIC RH(D): CPT

## 2023-02-25 PROCEDURE — 81003 URINALYSIS AUTO W/O SCOPE: CPT

## 2023-02-25 PROCEDURE — 86803 HEPATITIS C AB TEST: CPT

## 2023-02-25 PROCEDURE — 71045 X-RAY EXAM CHEST 1 VIEW: CPT | Mod: 26

## 2023-02-25 PROCEDURE — 99222 1ST HOSP IP/OBS MODERATE 55: CPT | Mod: GC

## 2023-02-25 PROCEDURE — 83735 ASSAY OF MAGNESIUM: CPT

## 2023-02-25 PROCEDURE — C1895: CPT

## 2023-02-25 PROCEDURE — 86923 COMPATIBILITY TEST ELECTRIC: CPT

## 2023-02-25 PROCEDURE — 80048 BASIC METABOLIC PNL TOTAL CA: CPT

## 2023-02-25 PROCEDURE — 36415 COLL VENOUS BLD VENIPUNCTURE: CPT

## 2023-02-25 PROCEDURE — 33249 INSJ/RPLCMT DEFIB W/LEAD(S): CPT

## 2023-02-25 PROCEDURE — 86900 BLOOD TYPING SEROLOGIC ABO: CPT

## 2023-02-25 PROCEDURE — 85025 COMPLETE CBC W/AUTO DIFF WBC: CPT

## 2023-02-25 PROCEDURE — 71045 X-RAY EXAM CHEST 1 VIEW: CPT

## 2023-02-25 PROCEDURE — 82962 GLUCOSE BLOOD TEST: CPT

## 2023-02-25 PROCEDURE — 71046 X-RAY EXAM CHEST 2 VIEWS: CPT

## 2023-02-25 PROCEDURE — C1721: CPT

## 2023-02-25 PROCEDURE — C1769: CPT

## 2023-02-25 PROCEDURE — C1892: CPT

## 2023-02-25 PROCEDURE — 93010 ELECTROCARDIOGRAM REPORT: CPT

## 2023-02-25 PROCEDURE — 93005 ELECTROCARDIOGRAM TRACING: CPT

## 2023-02-25 PROCEDURE — C1889: CPT

## 2023-02-25 PROCEDURE — 85027 COMPLETE CBC AUTOMATED: CPT

## 2023-02-25 PROCEDURE — 83036 HEMOGLOBIN GLYCOSYLATED A1C: CPT

## 2023-02-25 PROCEDURE — 87077 CULTURE AEROBIC IDENTIFY: CPT

## 2023-02-25 PROCEDURE — 87070 CULTURE OTHR SPECIMN AEROBIC: CPT

## 2023-02-25 PROCEDURE — C1751: CPT

## 2023-02-25 RX ORDER — SODIUM CHLORIDE 9 MG/ML
1000 INJECTION, SOLUTION INTRAVENOUS
Refills: 0 | Status: DISCONTINUED | OUTPATIENT
Start: 2023-02-25 | End: 2023-03-08

## 2023-02-25 RX ORDER — DULAGLUTIDE 4.5 MG/.5ML
1 INJECTION, SOLUTION SUBCUTANEOUS
Qty: 0 | Refills: 0 | DISCHARGE

## 2023-02-25 RX ORDER — DEXTROSE 50 % IN WATER 50 %
25 SYRINGE (ML) INTRAVENOUS ONCE
Refills: 0 | Status: DISCONTINUED | OUTPATIENT
Start: 2023-02-25 | End: 2023-03-08

## 2023-02-25 RX ORDER — FUROSEMIDE 40 MG
40 TABLET ORAL DAILY
Refills: 0 | Status: DISCONTINUED | OUTPATIENT
Start: 2023-02-25 | End: 2023-03-08

## 2023-02-25 RX ORDER — ASPIRIN/CALCIUM CARB/MAGNESIUM 324 MG
81 TABLET ORAL DAILY
Refills: 0 | Status: DISCONTINUED | OUTPATIENT
Start: 2023-02-25 | End: 2023-03-08

## 2023-02-25 RX ORDER — VANCOMYCIN HCL 1 G
1250 VIAL (EA) INTRAVENOUS EVERY 12 HOURS
Refills: 0 | Status: DISCONTINUED | OUTPATIENT
Start: 2023-02-25 | End: 2023-03-03

## 2023-02-25 RX ORDER — VALSARTAN 80 MG/1
1 TABLET ORAL
Qty: 0 | Refills: 0 | DISCHARGE

## 2023-02-25 RX ORDER — ASPIRIN/CALCIUM CARB/MAGNESIUM 324 MG
1 TABLET ORAL
Qty: 0 | Refills: 0 | DISCHARGE

## 2023-02-25 RX ORDER — ALLOPURINOL 300 MG
100 TABLET ORAL
Refills: 0 | Status: DISCONTINUED | OUTPATIENT
Start: 2023-02-25 | End: 2023-03-08

## 2023-02-25 RX ORDER — TICAGRELOR 90 MG/1
1 TABLET ORAL
Qty: 0 | Refills: 0 | DISCHARGE

## 2023-02-25 RX ORDER — VANCOMYCIN HCL 1 G
1000 VIAL (EA) INTRAVENOUS ONCE
Refills: 0 | Status: COMPLETED | OUTPATIENT
Start: 2023-02-25 | End: 2023-02-25

## 2023-02-25 RX ORDER — AMLODIPINE BESYLATE 2.5 MG/1
5 TABLET ORAL DAILY
Refills: 0 | Status: DISCONTINUED | OUTPATIENT
Start: 2023-02-25 | End: 2023-03-08

## 2023-02-25 RX ORDER — CARVEDILOL PHOSPHATE 80 MG/1
1 CAPSULE, EXTENDED RELEASE ORAL
Qty: 0 | Refills: 0 | DISCHARGE

## 2023-02-25 RX ORDER — ONDANSETRON 8 MG/1
4 TABLET, FILM COATED ORAL EVERY 8 HOURS
Refills: 0 | Status: DISCONTINUED | OUTPATIENT
Start: 2023-02-25 | End: 2023-03-08

## 2023-02-25 RX ORDER — ALLOPURINOL 300 MG
1 TABLET ORAL
Qty: 0 | Refills: 0 | DISCHARGE

## 2023-02-25 RX ORDER — TICAGRELOR 90 MG/1
60 TABLET ORAL EVERY 12 HOURS
Refills: 0 | Status: DISCONTINUED | OUTPATIENT
Start: 2023-02-26 | End: 2023-02-27

## 2023-02-25 RX ORDER — VALSARTAN 80 MG/1
80 TABLET ORAL DAILY
Refills: 0 | Status: DISCONTINUED | OUTPATIENT
Start: 2023-02-25 | End: 2023-03-08

## 2023-02-25 RX ORDER — GLUCAGON INJECTION, SOLUTION 0.5 MG/.1ML
1 INJECTION, SOLUTION SUBCUTANEOUS ONCE
Refills: 0 | Status: DISCONTINUED | OUTPATIENT
Start: 2023-02-25 | End: 2023-03-08

## 2023-02-25 RX ORDER — ACETAMINOPHEN 500 MG
650 TABLET ORAL EVERY 6 HOURS
Refills: 0 | Status: DISCONTINUED | OUTPATIENT
Start: 2023-02-25 | End: 2023-03-08

## 2023-02-25 RX ORDER — INSULIN GLARGINE 100 [IU]/ML
20 INJECTION, SOLUTION SUBCUTANEOUS AT BEDTIME
Refills: 0 | Status: DISCONTINUED | OUTPATIENT
Start: 2023-02-25 | End: 2023-03-08

## 2023-02-25 RX ORDER — ATORVASTATIN CALCIUM 80 MG/1
80 TABLET, FILM COATED ORAL AT BEDTIME
Refills: 0 | Status: DISCONTINUED | OUTPATIENT
Start: 2023-02-25 | End: 2023-03-08

## 2023-02-25 RX ORDER — AMLODIPINE BESYLATE 2.5 MG/1
1 TABLET ORAL
Qty: 0 | Refills: 0 | DISCHARGE

## 2023-02-25 RX ORDER — ATORVASTATIN CALCIUM 80 MG/1
1 TABLET, FILM COATED ORAL
Qty: 0 | Refills: 0 | DISCHARGE

## 2023-02-25 RX ORDER — DEXTROSE 50 % IN WATER 50 %
15 SYRINGE (ML) INTRAVENOUS ONCE
Refills: 0 | Status: DISCONTINUED | OUTPATIENT
Start: 2023-02-25 | End: 2023-03-08

## 2023-02-25 RX ORDER — DEXTROSE 50 % IN WATER 50 %
12.5 SYRINGE (ML) INTRAVENOUS ONCE
Refills: 0 | Status: DISCONTINUED | OUTPATIENT
Start: 2023-02-25 | End: 2023-03-08

## 2023-02-25 RX ORDER — HEPARIN SODIUM 5000 [USP'U]/ML
5000 INJECTION INTRAVENOUS; SUBCUTANEOUS EVERY 8 HOURS
Refills: 0 | Status: DISCONTINUED | OUTPATIENT
Start: 2023-02-25 | End: 2023-02-27

## 2023-02-25 RX ORDER — INSULIN LISPRO 100/ML
VIAL (ML) SUBCUTANEOUS
Refills: 0 | Status: DISCONTINUED | OUTPATIENT
Start: 2023-02-25 | End: 2023-03-08

## 2023-02-25 RX ORDER — FUROSEMIDE 40 MG
1 TABLET ORAL
Qty: 0 | Refills: 0 | DISCHARGE

## 2023-02-25 RX ORDER — LANOLIN ALCOHOL/MO/W.PET/CERES
3 CREAM (GRAM) TOPICAL AT BEDTIME
Refills: 0 | Status: DISCONTINUED | OUTPATIENT
Start: 2023-02-25 | End: 2023-03-08

## 2023-02-25 RX ORDER — CARVEDILOL PHOSPHATE 80 MG/1
12.5 CAPSULE, EXTENDED RELEASE ORAL EVERY 12 HOURS
Refills: 0 | Status: DISCONTINUED | OUTPATIENT
Start: 2023-02-25 | End: 2023-03-08

## 2023-02-25 RX ORDER — EZETIMIBE 10 MG/1
1 TABLET ORAL
Qty: 0 | Refills: 0 | DISCHARGE

## 2023-02-25 RX ORDER — INSULIN LISPRO 100/ML
6 VIAL (ML) SUBCUTANEOUS
Refills: 0 | Status: DISCONTINUED | OUTPATIENT
Start: 2023-02-25 | End: 2023-03-08

## 2023-02-25 RX ADMIN — ATORVASTATIN CALCIUM 80 MILLIGRAM(S): 80 TABLET, FILM COATED ORAL at 22:25

## 2023-02-25 RX ADMIN — Medication 250 MILLIGRAM(S): at 18:33

## 2023-02-25 NOTE — H&P ADULT - ATTENDING COMMENTS
HPI:  67-year-old male    Hx hx of CAD, s/p PCI x 2 2018, CHF, diabetes, hypertension, high cholesterol, CHF,[employer] aicd that was placed in [place] in 2019 post MI    Presents to the ED complaining of left chest wall abscess over defibrillator site.    Reports that he has had an inc in swelling around pocket of device for the past year however for the past week there has been an inc in redness  around the incision. He also reports chills within the last 24 hours and tenderness at the site. He denies ever having a problem with the device in the past and gets it checked remotely with his cardiologist Dr Rock in [place].    In the ED :  BP: Not recorded   HR : 80  RR : 18  T : 97  O2: 98% on RA     WBC : 10.45  Cr: 1.3 (unknown baseline)  CT chest with IV contrast : Collection adjacent to ICD within the left chest subcutaneous tissues measuring 2.6 x 2.3 x 1.7 cm.  ECG : Sinus with first degree block     s/p Vanco 1g IV x1    CT Surgery saw patient : pt may need to have device explanted and pocket washed out -- pending final plan by attending   EP consult placed for device interrogation      (25 Feb 2023 20:00)    REVIEW OF SYSTEMS: see cc/HPI   CONSTITUTIONAL: No weakness, fevers or chills  EYES/ENT: No visual changes;  No vertigo or throat pain   NECK: No pain or stiffness  RESPIRATORY: No cough, wheezing, hemoptysis; No shortness of breath  CARDIOVASCULAR: No chest pain or palpitations  GASTROINTESTINAL: No abdominal or epigastric pain. No nausea, vomiting, or hematemesis; No diarrhea or constipation. No melena or hematochezia.  GENITOURINARY: No dysuria, frequency or hematuria  NEUROLOGICAL: No numbness or weakness  SKIN: No itching, rashes,### #chest wall abscess    Physical Exam:  General: WN/WD NAD  Neurology: A&Ox3, nonfocal, follows commands  Eyes: PERRLA/ EOMI  ENT/Neck: Neck supple, trachea midline, No JVD  Respiratory: CTA B/L, No wheezing, rales, rhonchi  CV: Normal rate regular rhythm, S1S2, no murmurs, rubs or gallops  Abdominal: Soft, NT, ND +BS,   Extremities: No edema, + peripheral pulses  Skin: No Rashes, Hematoma, Ecchymosis #######chest wall swelling   Incisions:   Tubes:  A/p  Chest wall abscess around the AICD   -IV abx   -CT surgery for plan  -check blood Cx and repeat WBC  -ID eval     DM type II   -agree w/ Lantus / Lispro AC and F/s monitoring PRN     HFrEF  s/p AICD ( infected device)  H/o CAD / MI s/p PCI  -c/w current outpatient Rx and awaiting final plan for AICD replacement     Gout -stable  -c/w Allopurinol     DVT prophylaxis HPI:  67-year-old male    Hx hx of CAD, s/p PCI x 2 2018, CHF, diabetes, hypertension, high cholesterol, CHF,[employer] aicd that was placed in [place] in 2019 post MI    Presents to the ED complaining of left chest wall abscess over defibrillator site.    Reports that he has had an inc in swelling around pocket of device for the past year however for the past week there has been an inc in redness  around the incision. He also reports chills within the last 24 hours and tenderness at the site. He denies ever having a problem with the device in the past and gets it checked remotely with his cardiologist Dr Rock in [place].    In the ED :  BP: Not recorded  HR : 80       RR : 18   T : 97  O2: 98% on RA     WBC : 10.45  Cr: 1.3 (unknown baseline)  CT chest with IV contrast : Collection adjacent to ICD within the left chest subcutaneous tissues measuring 2.6 x 2.3 x 1.7 cm.  ECG : Sinus with first degree block     s/p Vanco 1g IV x1    CT Surgery saw patient : pt may need to have device explanted and pocket washed out -- pending final plan by attending   EP consult placed for device interrogation      (25 Feb 2023 20:00)    REVIEW OF SYSTEMS: see cc/HPI   CONSTITUTIONAL: No weakness, fevers or chills  EYES/ENT: No visual changes;  No vertigo or throat pain   NECK: No pain or stiffness  RESPIRATORY: No cough, wheezing, hemoptysis; No shortness of breath  CARDIOVASCULAR: No chest pain or palpitations  GASTROINTESTINAL: No abdominal or epigastric pain. No nausea, vomiting, or hematemesis; No diarrhea or constipation. No melena or hematochezia.  GENITOURINARY: No dysuria, frequency or hematuria  NEUROLOGICAL: No numbness or weakness  SKIN: No itching, rashes, L sided chest wall abscess    Physical Exam:  General: WN/WD NAD  Neurology: A&Ox3, nonfocal, follows commands  Eyes: PERRLA/ EOMI  ENT/Neck: Neck supple, trachea midline, No JVD  Respiratory: CTA B/L, No wheezing, rales, rhonchi  CV: Normal rate regular rhythm, S1S2, no murmurs, rubs or gallops  Abdominal: Soft, NT, ND +BS,   Extremities: No edema, + peripheral pulses  Skin: No Rashes, Hematoma, Ecchymosis, L sided chest wall swelling w/ fluctuant collection at the base of the AICD  Incisions:   Tubes:  A/p  Chest wall abscess around the AICD   -IV abx   -CT surgery for plan  -check blood Cx and repeat WBC  -ID eval     DM type II   -agree w/ Lantus / Lispro AC and F/s monitoring PRN     HFrEF  s/p AICD ( infected device)  H/o CAD / MI s/p PCI  -c/w current outpatient Rx and awaiting final plan for AICD replacement     Gout -stable  -c/w Allopurinol     DVT prophylaxis HPI:  67-year-old male    Hx hx of CAD, s/p PCI x 2 2018, CHF, diabetes, hypertension, high cholesterol, CHF,[employer] aicd that was placed in [place] in 2019 post MI    Presents to the ED complaining of left chest wall abscess over defibrillator site.    Reports that he has had an inc in swelling around pocket of device for the past year however for the past week there has been an inc in redness  around the incision. He also reports chills within the last 24 hours and tenderness at the site. He denies ever having a problem with the device in the past and gets it checked remotely with his cardiologist Dr Rock in [place].    In the ED :  BP: Not recorded  HR : 80       RR : 18   T : 97  O2: 98% on RA     WBC : 10.45  Cr: 1.3 (unknown baseline)  CT chest with IV contrast : Collection adjacent to ICD within the left chest subcutaneous tissues measuring 2.6 x 2.3 x 1.7 cm.  ECG : Sinus with first degree block     s/p Vanco 1g IV x1    CT Surgery saw patient : pt may need to have device explanted and pocket washed out -- pending final plan by attending   EP consult placed for device interrogation      (25 Feb 2023 20:00)    REVIEW OF SYSTEMS: see cc/HPI   CONSTITUTIONAL: No weakness, fevers or chills  EYES/ENT: No visual changes;  No vertigo or throat pain   NECK: No pain or stiffness  RESPIRATORY: No cough, wheezing, hemoptysis; No shortness of breath  CARDIOVASCULAR: No chest pain or palpitations  GASTROINTESTINAL: No abdominal or epigastric pain. No nausea, vomiting, or hematemesis; No diarrhea or constipation. No melena or hematochezia.  GENITOURINARY: No dysuria, frequency or hematuria  NEUROLOGICAL: No numbness or weakness  SKIN: No itching, rashes, L sided chest wall abscess    Physical Exam:  General: WN/WD NAD  Neurology: A&Ox3, nonfocal, follows commands  Eyes: PERRLA/ EOMI  ENT/Neck: Neck supple, trachea midline, No JVD  Respiratory: CTA B/L, No wheezing, rales, rhonchi  CV: Normal rate regular rhythm, S1S2, no murmurs, rubs or gallops  Abdominal: Soft, NT, ND +BS,   Extremities: No edema, + peripheral pulses  Skin: No Rashes, Hematoma, Ecchymosis, L sided chest wall swelling w/ fluctuant collection at the base of the AICD  Incisions:   Tubes:  A/p  Chest wall abscess around the AICD   -IV abx   -CT surgery for plan  -check blood Cx and repeat WBC  -ID eval     DM type II   -agree w/ Lantus / Lispro AC and F/s monitoring PRN     HFrEF  s/p AICD ( infected device)  H/o CAD / MI s/p PCI  -c/w current outpatient Rx and awaiting final plan for AICD replacement     Gout -stable  -c/w Allopurinol     DVT prophylaxis    PATIENT SEEN by ATTENDING 2/25/23 ( note revised 2/26).

## 2023-02-25 NOTE — CONSULT NOTE ADULT - SUBJECTIVE AND OBJECTIVE BOX
Surgeon: /Moshe/Nick/Evans    Consult requesting by: heather matute    HISTORY OF PRESENT ILLNESS:  67y Male     NYHA functional class    [ ] Class I (no limitation) [ ] Class II (slight limitation) [ ] Class III (marked limitation) [ ] Class IV (symptoms at rest)    PAST MEDICAL & SURGICAL HISTORY:  Diabetes mellitus, type 2      History of heart attack      Hypertension          MEDICATIONS  (STANDING):  vancomycin  IVPB. 1000 milliGRAM(s) IV Intermittent once    MEDICATIONS  (PRN):    Antiplatelet therapy:                           Last dose/amt:    Allergies    No Known Allergies    Intolerances        SOCIAL HISTORY:  Smoker: [ ] Yes  [ ] No        PACK YEARS:                         WHEN QUIT?  ETOH use: [ ] Yes  [ ] No              FREQUENCY / QUANTITY:  Ilicit Drug use:  [ ] Yes  [ ] No  Occupation:  Lives with:  Assisted device use:  5 meter walk test: 1____sec, 2____sec, 3___sec  FAMILY HISTORY:      Review of Systems  CONSTITUTIONAL:  Fevers[ ] chills[ ] sweats[ ] fatigue[ ] weight loss[ ] weight gain [ ]                                     NEGATIVE [X ]   NEURO:  parathesias[ ] seizures [ ]  syncope [ ]  confusion [ ]                                                                                NEGATIVE[ X]   EYES: glasses[ ]  blurry vision[ ]  discharge[ ] pain[ ] glaucoma [ ]                                                                          NEGATIVE[X ]   ENMT:  difficulty hearing [ ]  vertigo[ ]  dysphagia[ ] epistaxis[ ] recent dental work [ ]                                    NEGATIVE[ X]   CV:  chest pain[ ] palpitations[ ] DUBON [ ] diaphoresis [ ]                                                                                           NEGATIVE[ X]   RESPIRATORY:  wheezing[ ] SOB[ ] cough [ ] sputum[ ] hemoptysis[ ]                                                                  NEGATIVE[ ]   GI:  nausea[ ]  vommiting [ ]  diarrhea[ ] constipation [ ] melena [ ]                                                                      NEGATIVE[ X]   : hematuria[ ]  dysuria[ ] urgency[ ] incontinence[ ]                                                                                            NEGATIVE[ X]   MUSKULOSKELETAL:  arthritis[ ]  joint swelling [ ] muscle weakness [ ] Hx vein stripping [ ]                             NEGATIVE[X ]   SKIN/BREAST:  rash[ ] itching [ ]  hair loss[ ] masses[ ]                                                                                              NEGATIVE[ X]   PSYCH:  dementia [ ] depresion [ ] anxiety[ ]                                                                                                               NEGATIVE[X ]   HEME/LYMPH:  bruises easily[ ] enlarged lymph nodes[ ] tender lymph nodes[ ]                                               NEGATIVE[ X]   ENDOCRINE:  cold intolerance[ ] heat intolerance[ ] polydipsia[ ]                                                                          NEGATIVE[ X]     PHYSICAL EXAM  Vital Signs Last 24 Hrs  T(C): 36.4 (25 Feb 2023 14:59), Max: 36.4 (25 Feb 2023 14:59)  T(F): 97.6 (25 Feb 2023 14:59), Max: 97.6 (25 Feb 2023 14:59)  HR: 80 (25 Feb 2023 14:59) (80 - 80)  BP: --  BP(mean): --  RR: 18 (25 Feb 2023 14:59) (18 - 18)  SpO2: 98% (25 Feb 2023 14:59) (98% - 98%)    Parameters below as of 25 Feb 2023 14:59  Patient On (Oxygen Delivery Method): room air      Right arm bp: Left arm bp;    CONSTITUTIONAL:                                                                          WNL[ ]   Neuro: WNL[ ] Normal exam oriented to person/place & time with no focal motor or sensory  deficits. Other                     Eyes: WNL[ ]   Normal exam of conjunctiva & lids, pupils equally reactive. Other     ENT: WNL[ ]    Normal exam of nasal/oral mucosa with absence of cyanosis. Other  Neck: WNL[ ]  Normal exam of jugular veins, trachea & thyroid. Other  Chest: WNL[ ] Normal lung exam with good air movement absence of wheezes, rales, or rhonchi: Other                                                                                CV:  Auscultation: normal [ ] S3[ ] S4[ ] Irregular [ ] Rub[ ] Clicks[ ]    Murmurs none:[ ]systolic [ ]  diastolic [ ] holosystolic [ ]  Carotids: No Bruits[ ] Other                 Abdominal Aorta: normal [ ] nonpalpable[ ]Other                                                                                      GI:           WNL[ ] Normal exam of abdomen, liver & spleen with no noted masses or tenderness. Other                                                                                                        Extremities: WNL[ ] Normal no evidence of cyanosis or deformity Edema: none[ ]trace[ ]1+[ ]2+[ ]3+[ ]4+[ ]  Lower Extremity Pulses: Right[ ] Left[ ]Varicosities[ ]  SKIN :WNL[ ] Normal exam to inspection & palation. Other:                                                          LABS:                        14.8   10.45 )-----------( 266      ( 25 Feb 2023 16:44 )             44.5     02-25    138  |  101  |  29<H>  ----------------------------<  146<H>  4.3   |  26  |  1.3    Ca    9.5      25 Feb 2023 16:44    TPro  7.0  /  Alb  4.4  /  TBili  0.6  /  DBili  x   /  AST  30  /  ALT  38  /  AlkPhos  151<H>  02-25                Cardiac Cath:    TTE / OCHOA:    Recommendation: (Procedures/Evaluations)  CT HEAD Nonn-Contrast:[  ]  CT Chest with /without contrast [ ]  Carotid Duplex :[ ]  MAXWELL/PVR: [ ]  PFT : Simple PFT [ ]  Full [ ]  Renal Consult [ ]  Pulmonary Consult: [ ]   Vascular Consult [ ]    Dental Consult [ ]   Hem-Onc Consult [ ]   GI Consult [ ]   Other Consultations :    STS Score:     Impression:    CAD [ ]  Valvular  disease [ ]   Aortic Disease [ ]   JONAS: Yes[ ] No [ ]   CKD Stage I [ ] , Stage II [ ] , Stage III [ ], Stage IV [ ]   Anemia: Yes [ ], No [ ]  Diabetes :Yes [ ], No [ ]  Acute MI: Yes [ ], No [ ]   Heart Failure: Yes [ ] , No [ ] HFpEF [ ], HFrEF [ ]        Assessment/ Plan:                 Surgeon: /Moshe/Nick/Evans    Consult requesting by: heather matute    HISTORY OF PRESENT ILLNESS:  67y Male with hx of [employer] aicd that was placed in [place] in 2019 reports that he has had an inc in swelling around pocket of device for the past year however for the past week there has been an inc in redness  around the incision.   He also reports chills within the last 24 hours and tenderness at the site.  Pt states he has had 2 coronary stents placed in 2018 and has been on Brilinta ever since.  He denies ever having a problem with the device in the past and gets it checked remotely with his cardiologist Dr Rock.    NYHA functional class    [ ] Class I (no limitation) [x ] Class II (slight limitation) [ ] Class III (marked limitation) [ ] Class IV (symptoms at rest)    PAST MEDICAL & SURGICAL HISTORY:  Diabetes mellitus, type 2      History of heart attack      Hypertension      MEDICATIONS  (STANDING):  vancomycin  IVPB. 1000 milliGRAM(s) IV Intermittent once    MEDICATIONS  (PRN):    Antiplatelet therapy:            Briliinta               Last dose/amt: 2/25/23    Allergies    No Known Allergies    Intolerances    SOCIAL HISTORY:  Smoker: [ ] Yes  [ x] No        PACK YEARS:                         WHEN QUIT?  ETOH use: [ ] Yes  [ x] No              FREQUENCY / QUANTITY:  Ilicit Drug use:  [ ] Yes  [x ] No  Occupation:  Lives with: sister        Review of Systems  CONSTITUTIONAL:  Fevers[ ] chills[ ] sweats[ ] fatigue[ ] weight loss[ ] weight gain [ ]                                     NEGATIVE [X ]   NEURO:  parathesias[ ] seizures [ ]  syncope [ ]  confusion [ ]                                                                                NEGATIVE[ X]   EYES: glasses[ ]  blurry vision[ ]  discharge[ ] pain[ ] glaucoma [ ]                                                                          NEGATIVE[X ]   ENMT:  difficulty hearing [ ]  vertigo[ ]  dysphagia[ ] epistaxis[ ] recent dental work [ ]                                    NEGATIVE[ X]   CV:  chest pain[ ] palpitations[ ] DUBON [ ] diaphoresis [ ]                                                                                           NEGATIVE[ X]   RESPIRATORY:  wheezing[ ] SOB[ ] cough [ ] sputum[ ] hemoptysis[ ]                                                                  NEGATIVE[ ]   GI:  nausea[ ]  vommiting [ ]  diarrhea[ ] constipation [ ] melena [ ]                                                                      NEGATIVE[ X]   : hematuria[ ]  dysuria[ ] urgency[ ] incontinence[ ]                                                                                            NEGATIVE[ X]   MUSKULOSKELETAL:  arthritis[ ]  joint swelling [ ] muscle weakness [ ] Hx vein stripping [ ]                             NEGATIVE[X ]   SKIN/BREAST:  rash[ ] itching [ ]  hair loss[ ] masses[ ]                                                                                              NEGATIVE[ X]   PSYCH:  dementia [ ] depresion [ ] anxiety[ ]                                                                                                               NEGATIVE[X ]   HEME/LYMPH:  bruises easily[ ] enlarged lymph nodes[ ] tender lymph nodes[ ]                                               NEGATIVE[ X]   ENDOCRINE:  cold intolerance[ ] heat intolerance[ ] polydipsia[ ]                                                                          NEGATIVE[ X]     PHYSICAL EXAM  Vital Signs Last 24 Hrs  T(C): 36.4 (25 Feb 2023 14:59), Max: 36.4 (25 Feb 2023 14:59)  T(F): 97.6 (25 Feb 2023 14:59), Max: 97.6 (25 Feb 2023 14:59)  HR: 80 (25 Feb 2023 14:59) (80 - 80)  BP: --  BP(mean): --  RR: 18 (25 Feb 2023 14:59) (18 - 18)  SpO2: 98% (25 Feb 2023 14:59) (98% - 98%)    Parameters below as of 25 Feb 2023 14:59  Patient On (Oxygen Delivery Method): room air        CONSTITUTIONAL:                                                                          WNL[ ]   Neuro: WNL[ ] Normal exam oriented to person/place & time with no focal motor or sensory  deficits. Other                     Eyes: WNL[ ]   Normal exam of conjunctiva & lids, pupils equally reactive. Other     ENT: WNL[ ]    Normal exam of nasal/oral mucosa with absence of cyanosis. Other  Neck: WNL[ ]  Normal exam of jugular veins, trachea & thyroid. Other  Chest: WNL[ ] Normal lung exam with good air movement absence of wheezes, rales, or rhonchi: Other -- left anterior chest incision- pos edema and erythema around the site warm and tender to touch                                                                    CV:  Auscultation: normal [ ] S3[ ] S4[ ] Irregular [ ] Rub[ ] Clicks[ ]    Murmurs none:[ ]systolic [ ]  diastolic [ ] holosystolic [ ]  Carotids: No Bruits[ ] Other                 Abdominal Aorta: normal [ ] nonpalpable[ ]Other                                                                                      GI:           WNL[ ] Normal exam of abdomen, liver & spleen with no noted masses or tenderness. Other                                                                                                        Extremities: WNL[ ] Normal no evidence of cyanosis or deformity Edema: none[ ]trace[ ]1+[ ]2+[ ]3+[ ]4+[ ]  Lower Extremity Pulses: Right[ ] Left[ ]Varicosities[ ]  SKIN :WNL[ ] Normal exam to inspection & palation. Other:                                                          LABS:                        14.8   10.45 )-----------( 266      ( 25 Feb 2023 16:44 )             44.5     02-25    138  |  101  |  29<H>  ----------------------------<  146<H>  4.3   |  26  |  1.3    Ca    9.5      25 Feb 2023 16:44    TPro  7.0  /  Alb  4.4  /  TBili  0.6  /  DBili  x   /  AST  30  /  ALT  38  /  AlkPhos  151<H>  02-25    ACC: [id number] EXAM:  CT CHEST IC   ORDERED BY: МАРИЯ MATAMOROS     PROCEDURE DATE:  02/25/2023          INTERPRETATION:  CLINICAL HISTORY / REASON FOR EXAM: Pain; chest wall   abscess.    TECHNIQUE: Multislice helical sections were obtained from the thoracic   inlet to the lung bases following administration of 85 mL Omnipaque 350   intravenous contrast, 25 mL discarded. Coronal, sagittal and 3D/MIP   reformatted images are also submitted.    CORRELATION: Chest radiograph from February 25, 2023.    FINDINGS:    TUBES/LINES: ICD device within the subcutaneous tissues of the left chest   and leads terminating in the right atrium and right ventricle.    LUNGS, PLEURA, AND AIRWAYS: Bibasilar atelectasis/scarring. No   pneumothorax. No consolidation or pleural effusion. Central airways   patent.    MEDIASTINUM/LYMPH NODES: No mediastinal or axillary lymphadenopathy.    HEART/GREAT VESSELS: No pericardial effusion. Heart size unremarkable.   Coronary arterial stent. No aneurysmal dilation of the thoracic aorta.    BONES/SOFT TISSUES: Collection adjacent to pacing device within the left   chest subcutaneous tissues measuring 2.6 x 2.3 x 1.7 cm (series 5, image   95). Mild to moderate degenerative changes of the thoracic spine.    VISUALIZED UPPER ABDOMEN: Left renal indeterminate hypodensity.      IMPRESSION:    Collection adjacent to ICD within the left chest subcutaneous tissues   measuring 2.6 x 2.3 x 1.7 cm.    --- End of Report ---    VENKAT IRWIN MD; Attending Radiologist  This document has been electronically signed. Feb 25 2023  6:53PM      Assessment/ Plan: Patient is a 68yo male with a hx of CAD, s/p PCI x 2, CHF, DM, and is s/p AICD now with what appears to be an abscess in the pocket  cont treatement as per medicine including abx and follow up blood cultures  ct report appreciated and will discuss findings with ct surgeon-- pt may need to have device explanted and pocket washed out   also rec ep consult to interrogate the [employer] aicd

## 2023-02-25 NOTE — H&P ADULT - NSHPREVIEWOFSYSTEMS_GEN_ALL_CORE
REVIEW OF SYSTEMS:    CONSTITUTIONAL:  No weakness, fevers or chills  EYES/ENT:  No visual changes;  No vertigo or throat pain   NECK:  No pain or stiffness  RESPIRATORY:  No cough, wheezing, hemoptysis; No shortness of breath  CARDIOVASCULAR:  No chest pain or palpitations  GASTROINTESTINAL:  No abdominal or epigastric pain. No nausea, vomiting, or hematemesis; No diarrhea or constipation. No melena or hematochezia.  GENITOURINARY:  No dysuria, frequency or hematuria  NEUROLOGICAL:  No numbness or weakness  SKIN:  No itching, rashes REVIEW OF SYSTEMS:    CONSTITUTIONAL:  No weakness, some chills   EYES/ENT:  No visual changes;  No vertigo or throat pain   NECK:  No pain or stiffness  RESPIRATORY:  No cough, wheezing, hemoptysis; No shortness of breath  CARDIOVASCULAR:  No chest pain, tender red abscess over AICD site  GASTROINTESTINAL:  No abdominal or epigastric pain. No nausea, vomiting, or hematemesis; No diarrhea or constipation. No melena or hematochezia.  GENITOURINARY:  No dysuria, frequency or hematuria  NEUROLOGICAL:  No numbness or weakness  SKIN:  skin over AICD itches

## 2023-02-25 NOTE — ED ADULT TRIAGE NOTE - CHIEF COMPLAINT QUOTE
abscess to left chest wall for over a year, reports worsening redness and swelling for past couple of days. ,+ diabetic.

## 2023-02-25 NOTE — ED PROVIDER NOTE - OBJECTIVE STATEMENT
67-year-old male with history of CAD status post stents, diabetes, hypertension, high cholesterol, CHF, status post AICD in 2019 in [place] presents to the ED complaining of left chest wall abscess over defibrillator site.  Patient complaining of worsening redness and swelling with chills and nausea for couple of days.  No fever, chest pain, shortness of breath or weakness.

## 2023-02-25 NOTE — ED ADULT TRIAGE NOTE - SPO2 (%)
98 Rhofade Counseling: Rhofade is a topical medication which can decrease superficial blood flow where applied. Side effects are uncommon and include stinging, redness and allergic reactions.

## 2023-02-25 NOTE — ED PROVIDER NOTE - PHYSICAL EXAMINATION
Vital Signs: I have reviewed the initial vital signs.  Constitutional: NAD, well-nourished, appears stated age, no acute distress.  HEENT: Airway patent, moist MM, no erythema/swelling/deformity of oral structures. EOMI, PERRLA.  CV: regular rate, regular rhythm, well-perfused extremities, 2+ b/l DP and radial pulses equal.  Lungs: BCTA, no increased WOB.  ABD: NTND, no guarding or rebound, no pulsatile mass, no hernias.   MSK: Neck supple, nontender, nl ROM, no stepoff. Chest nontender. Back nontender. Ext nontender, nl rom, no deformity.   INTEG: + Left anterior chest fluctuant abscess with some redness/ warmth.  NEURO: A&Ox3, normal strength, nl sensation throughout, normal speech.   PSYCH: Calm, cooperative, normal affect and interaction.

## 2023-02-25 NOTE — H&P ADULT - NSVTERISKASSESS_GEN_ALL_CORE FT
Nurse's Notes                                                                                     

 [facility]                                                                 

Name: Dong Reagan                                                                              

Age: 4 yrs                                                                                        

Sex: Male                                                                                         

: 2016                                                                                   

MRN: [id number]                                                                                   

Arrival Date: 10/28/2020                                                                          

Time: 21:22                                                                                       

Account#: [id number]                                                                            

Bed 7                                                                                             

Private MD:                                                                                       

Diagnosis: Swallowed F. B. ( Washer )                                                             

                                                                                                  

Presentation:                                                                                     

10/28                                                                                             

21:30 Chief complaint: Parent and/or Guardian states: Patient swallowed a coin 20 min PTA. No ll1 

      trouble breathing since. But mom states he grabbed his throat when he swallowed it.         

      Active, playful in triage. Coronavirus screen: Client denies travel out of the [place] in      

      the last 14 days. At this time, the client does not indicate any symptoms associated        

      with coronavirus-19. Ebola Screen: Patient denies travel to an Ebola-affected area in       

      the 21 days before illness onset. Onset of symptoms was 2020.                   

21:30 Method Of Arrival: Carried                                                              ll1 

21:30 Acuity: ALMA ROSA 4                                                                           ll1 

                                                                                                  

Historical:                                                                                       

- Allergies:                                                                                      

21:31 No Known Allergies;                                                                     ll1 

- PMHx:                                                                                           

23:04 None;                                                                                   sg  

- PSHx:                                                                                           

21:31 None;                                                                                   ll1 

                                                                                                  

- Immunization history:: Childhood immunizations are up to date, Flu vaccine is up to             

  date.                                                                                           

- Social history:: Smoking status: Patient denies any tobacco usage or history of.                

                                                                                                  

                                                                                                  

Screenin:46 Abuse screen: Denies threats or abuse. Denies injuries from another. Nutritional        rv  

      screening: No deficits noted. Tuberculosis screening: No symptoms or risk factors           

      identified.                                                                                 

21:46 Pedi Fall Risk Total Score: 0-1 Points : Low Risk for Falls.                            rv  

                                                                                                  

      Fall Risk Scale Score:                                                                      

21:46 Mobility: Ambulatory with no gait disturbance (0); Mentation: Developmentally           rv  

      appropriate and alert (0); Elimination: Independent (0); Hx of Falls: No (0); Current       

      Meds: No (0); Total Score: 0                                                                

Assessment:                                                                                       

21:45 General: Appears comfortable, Behavior is appropriate for age. Pain: Denies pain.       rv  

      Neuro: Level of Consciousness is awake, alert, obeys commands, Oriented to Appropriate      

      for age. Cardiovascular: Patient's skin is warm and dry. Respiratory: Airway is patent      

      Respiratory effort is even, unlabored, Breath sounds are clear bilaterally. Derm: Skin      

      is intact.                                                                                  

21:46 GI: Abdomen is flat, non-distended.                                                     rv  

22:38 Reassessment: Patient appears in no apparent distress at this time. Pedi assessment:    sg  

      Patient is alert, active, and playful. awaiting for radiology results at this time, no      

      new orders received, will continue to monitor.                                              

22:43 Reassessment:  at bedside updating pt and pt family on POC, xray results and      sg  

      discharge instuctions.                                                                      

                                                                                                  

Vital Signs:                                                                                      

21:30 Pulse 114; Resp 24; Temp 97.6(TE); Pulse Ox 100% ; Weight 15.42 kg; Pain 0/10;          ll1 

22:50 Pulse 108; Resp 26; Pulse Ox 100% on R/A; Pain 0/10;                                    sg  

                                                                                                  

ED Course:                                                                                        

21:22 Patient arrived in ED.                                                                  cl3 

21:27 Jeff Fall MD is Attending Physician.                                                    pkl 

21:31 Triage completed.                                                                       ll1 

21:31 Arm band placed on Patient placed in an exam room, on a stretcher.                      ll1 

21:43 Mekhi Carrington, RN is Primary Nurse.                                                  rv  

21:46 Patient has correct armband on for positive identification. Pulse ox on.                rv  

21:57 Foreign Body Sngl Flm Child In Process Unspecified.                                     EDMS

22:50 No provider procedures requiring assistance completed. Patient did not have IV access   sg  

      during this emergency room visit.                                                           

                                                                                                  

Administered Medications:                                                                         

No medications were administered                                                                  

                                                                                                  

                                                                                                  

Outcome:                                                                                          

22:50 Discharged to home ambulatory, with family.                                             sg  

22:50 Condition: good                                                                             

22:50 Discharge instructions given to family, caretaker, Instructed on discharge                  

      instructions, follow up and referral plans. safety practices, observe BM's for foreign      

      body, pt given a stool hat for use at home, pt family stated understanding                  

22:51 Discharge ordered by MD.                                                                pkl 

22:52 Patient left the ED.                                                                    bb  

                                                                                                  

Signatures:                                                                                       

Dispatcher [employer]                           EDMS                                                 

Ammon Moody RN RN                                                      

Jeff Fall MD MD   pkl                                                  

Coni Corral RN                     RN   bb                                                   

Mekhi Carrington, TIERA MOTT   rv                                                   

Brant Sims                                cl3                                                  

Breanna Sims RN                       RN   ll1                                                  

                                                                                                  

**************************************************************************************************
VTE Present on Admission, Assessment Completed on: 25-Feb-2023 20:12

## 2023-02-25 NOTE — ED ADULT NURSE NOTE - OBJECTIVE STATEMENT
Endoscopy discharge instructions have been reviewed and given to patient. The patient verbalized understanding and acceptance of instructions. Dr. Damari El discussed with patient procedure findings and next steps.
[facility]  1946  [id number]    Situation:  Verbal report received from: Mariya AREVALO   Procedure: Procedure(s):  COLONOSCOPY  ENDOSCOPIC POLYPECTOMY    Background:    Preoperative diagnosis: PERSONAL HX OF COLONIC POLYPS  Postoperative diagnosis: Colon Polyps x2  Hemorrhoids    :  Dr. Sherry Ramirez  Assistant(s): Endoscopy Technician-1: Milka Calhoun  Endoscopy RN-1: Bulmaro Tobar RN    Specimens:   ID Type Source Tests Collected by Time Destination   1 : Transverse Polyp Preservative   Nini Elizabeth MD 12/9/2022 6957 Pathology   2 : Sigmoid polyp Preservative   Nini Elizabeth MD 12/9/2022 0945 Pathology     H. Pylori  no    Assessment:    Anesthesia gave intra-procedure sedation and medications, see anesthesia flow sheet no    Intravenous fluids: NS@ KVO     Vital signs stable     Abdominal assessment: round and soft     Recommendation:  Discharge patient per MD order.   Return to floor  Family or Friend   Permission to share finding with family or friend yes
Pt presents to ER c/o abscess to left chest wall for over a year, reports worsening redness and swelling for about two weeks.

## 2023-02-25 NOTE — ED PROVIDER NOTE - NS ED ATTENDING STATEMENT MOD
This was a shared visit with the LARISSA. I reviewed and verified the documentation and independently performed the documented:

## 2023-02-25 NOTE — ED ADULT TRIAGE NOTE - TEMPERATURE IN FAHRENHEIT (DEGREES F)
Received request for modafinil (PROVIGIL) 200 MG tablet   Rx last refilled on 02/08/2021 for 90 day supply w/0 RF  Refill refused, too soon   97.6

## 2023-02-25 NOTE — H&P ADULT - HISTORY OF PRESENT ILLNESS
67-year-old male    Hx hx of CAD, s/p PCI x 2, CHF, diabetes, hypertension, high cholesterol, CHF,   [employer] aicd that was placed in [place] in 2019    Presents to the ED complaining of left chest wall abscess over defibrillator site.    Reports that he has had an inc in swelling around pocket of device for the past year however for the past week there has been an inc in redness  around the incision.   He also reports chills within the last 24 hours and tenderness at the site.  Pt states he has had 2 coronary stents placed in 2018 and has been on Brilinta ever since.  He denies ever having a problem with the device in the past and gets it checked remotely with his cardiologist Dr Rock.    In the ED :  BP: Not recorded   HR : 80  RR : 18  T : 97  O2: 98% on RA     WBC : 10.45  Cr: 1.3 (unknown baseline)  CT chest with IV contrast : Collection adjacent to ICD within the left chest subcutaneous tissues measuring 2.6 x 2.3 x 1.7 cm.  ECG : Sinus with first degree block     s/p Vanco 1g IV x1    CT Surgery saw patient : pt may need to have device explanted and pocket washed out -- pending final plan by attending   EP consult placed for device interrogation              67-year-old male    Hx hx of CAD, s/p PCI x 2 2018, CHF, diabetes, hypertension, high cholesterol, CHF,[employer] aicd that was placed in [place] in 2019 post MI    Presents to the ED complaining of left chest wall abscess over defibrillator site.    Reports that he has had an inc in swelling around pocket of device for the past year however for the past week there has been an inc in redness  around the incision. He also reports chills within the last 24 hours and tenderness at the site. He denies ever having a problem with the device in the past and gets it checked remotely with his cardiologist Dr Rock in [place].    In the ED :  BP: Not recorded   HR : 80  RR : 18  T : 97  O2: 98% on RA     WBC : 10.45  Cr: 1.3 (unknown baseline)  CT chest with IV contrast : Collection adjacent to ICD within the left chest subcutaneous tissues measuring 2.6 x 2.3 x 1.7 cm.  ECG : Sinus with first degree block     s/p Vanco 1g IV x1    CT Surgery saw patient : pt may need to have device explanted and pocket washed out -- pending final plan by attending   EP consult placed for device interrogation

## 2023-02-25 NOTE — H&P ADULT - NSHPPHYSICALEXAM_GEN_ALL_CORE
GENERAL: NAD, lying in bed comfortably  HEAD:  Atraumatic, Normocephalic  EYES: EOMI, PERRLA, conjunctiva and sclera clear  ENT: Moist mucous membranes  NECK: Supple, No JVD  CHEST/LUNG: Clear to auscultation bilaterally; No rales, rhonchi, wheezing, or rubs. Unlabored respirations  HEART: Regular rate and rhythm; No murmurs, rubs, or gallops  ABDOMEN: Bowel sounds present; Soft, Nontender, Nondistended. No hepatomegally  EXTREMITIES:  2+ Peripheral Pulses, brisk capillary refill. No clubbing, cyanosis, or edema  NERVOUS SYSTEM:  Alert & Oriented X3, speech clear. No deficits   MSK: FROM all 4 extremities, full and equal strength  SKIN: No rashes or lesions GENERAL: NAD, lying in bed comfortably  HEAD:  Atraumatic, Normocephalic  EYES: EOMI, PERRLA, conjunctiva and sclera clear  ENT: Moist mucous membranes  NECK: Supple, No JVD  CHEST/LUNG: Clear to auscultation bilaterally; No rales, rhonchi, wheezing, or rubs. Unlabored respirations  HEART: Regular rate and rhythm; No murmurs, rubs, or gallops  ABDOMEN: Bowel sounds present; Soft, Nontender, Nondistended. No hepatomegally  EXTREMITIES:  2+ Peripheral Pulses, brisk capillary refill. No clubbing, cyanosis, or edema  NERVOUS SYSTEM:  Alert & Oriented X3, speech clear. No deficits   MSK: FROM all 4 extremities, full and equal strength  SKIN: 2cm abscess and red discoloration of SKIN on L chest above AICD site

## 2023-02-25 NOTE — ED PROVIDER NOTE - NS ED ROS FT
Constitutional: (-) fever  Eyes/ENT: (-) blurry vision, (-) epistaxis  Cardiovascular: (-) chest pain, (-) syncope  Respiratory: (-) cough, (-) shortness of breath  Gastrointestinal: (-) vomiting, (-) diarrhea  Musculoskeletal: (-) neck pain, (-) back pain, (-) joint pain  Integumentary: (+) abscess  Neurological: (-) headache, (-) altered mental status  Psychiatric: (-) hallucinations  Allergic/Immunologic: (-) pruritus

## 2023-02-25 NOTE — ED PROVIDER NOTE - ATTENDING APP SHARED VISIT CONTRIBUTION OF CARE
67-year-old male past medical history CAD stents diabetes hypertension hyperlipidemia CHF AICD in 2019 in [place], on Brilinta, presents with 1 year of left chest abscess.  Symptoms worsening in the last few days of redness swelling and pain.  Has chills and nausea associated.  Has never seen anyone for this abscess.  Has never taken antibiotics or had drainage.    On exam, AFVSS, Well appearing, No acute distress, NCAT, EOMI, PERRLA, MMM, Neck supple, left chest wall abscess with surrounding cellulitis, fluctuance, adjacent to/involving AICD in left upper chest, LCTAB, RRR nl s1s2 No mrg, Abdomen Soft NTND, AAOx3, No Focal Deficits, No LE edema or calf TTP,    A/P; abscess around AICD, will get labs CT surgery consult imaging IV antibiotics suspect admission cultures

## 2023-02-25 NOTE — H&P ADULT - NSHPLABSRESULTS_GEN_ALL_CORE
(02-25 @ 16:44)                      14.8  10.45 )-----------( 266                 44.5    Neutrophils = 7.22 (69.1%)  Lymphocytes = 1.78 (17.0%)  Eosinophils = 0.53 (5.1%)  Basophils = 0.15 (1.4%)  Monocytes = 0.75 (7.2%)  Bands = --%    02-25    138  |  101  |  29<H>  ----------------------------<  146<H>  4.3   |  26  |  1.3    Ca    9.5      25 Feb 2023 16:44    TPro  7.0  /  Alb  4.4  /  TBili  0.6  /  DBili  x   /  AST  30  /  ALT  38  /  AlkPhos  151<H>  02-25

## 2023-02-25 NOTE — H&P ADULT - ASSESSMENT
68 yo M : hx of CAD, s/p PCI x 2, CHF, diabetes, hypertension, high cholesterol, CHF, [employer] aicd that was placed in [place] in 2019.   Presenting for Redness and discomfort over defib site.     # Abscess in AICD pocket   - CT chest with IV contrast : Collection adjacent to ICD within the left chest subcutaneous tissues measuring 2.6 x 2.3 x 1.7 cm.  - ECG : Sinus with first degree block   - Pending final CT surgery plan : pt may need to have device explanted and pocket washed out   - Pending interrogation of device by EP   - Trend Fever and WBC - negative for now   - Abx :       # DM    # CHF   # CAD s/po PCI x2   # HT, HLD   - Pending records form Dr Rock in [place] please call : [phone]   68 yo M : hx of CAD, s/p PCI x 2, CHF, diabetes, hypertension, high cholesterol, CHF, [employer] aicd that was placed in [place] in 2019.   Presenting for Redness and discomfort over defib site.     # Abscess in AICD pocket   - CT chest with IV contrast on admission : Collection adjacent to ICD within the left chest subcutaneous tissues measuring 2.6 x 2.3 x 1.7 cm.  - ECG on admission : Sinus with first degree block     - Stress ECHO Dec 2022 : The patient exercised for one minute and 10 seconds and reached target HR. Test was terminated due to fatigue. Fixed and akinetic apex and anterior wall. Did not augment with stress with inferior inferolateral. Posterior lateral walls improve with exercise. EF was 30-35%; improved to 40% with exercise. No arrhythmias noted. There was a run of atrial bigeminy/atrial tachycardia which resolved spontaneously during recovery. There is no evidence of ventricular tachycardia or ventricular fibrillation    - Pending final CT surgery plan : pt may need to have device explanted and pocket washed out   - Pending interrogation of device by EP   - Trend Fever and WBC - negative for now   - Abx : s/p 1g Vanco in ED       # DM    # CHF   # CAD s/po PCI x2   # HT, HLD   - Pending records form Dr Rock in [place] please call : [phone]   68 yo M : hx of CAD, s/p PCI x 2, CHF, diabetes, hypertension, high cholesterol, CHF, [employer] aicd that was placed in [place] in 2019.   Presenting for Redness and discomfort over defib site.     # Abscess in AICD pocket   - CT chest with IV contrast on admission : Collection adjacent to ICD within the left chest subcutaneous tissues measuring 2.6 x 2.3 x 1.7 cm.  - ECG on admission : Sinus with first degree block     - Pending final CT surgery plan : pt may need to have device explanted and pocket washed out   - Pending interrogation of device by EP   - Trend Fever and WBC - negative for now   - Abx : s/p 1g Vanco in ED c/w vancomycin 15mg/kg q8  - f/u Bcx   - f/u TTE    # DM  - home on trulicity   - glucose 146 on admission started on : 20/6-6-6 + SS    # HFrEF  # Hx MI, CAD s/po PCI x2 , AICD  # HT, HLD   - Follows with Dr Rock  - Stress ECHO Dec 2022 : The patient exercised for one minute and 10 seconds and reached target HR. Test was terminated due to fatigue. Fixed and akinetic apex and anterior wall. Did not augment with stress with inferior inferolateral. Posterior lateral walls improve with exercise. EF was 30-35%; improved to 40% with exercise. No arrhythmias noted. There was a run of atrial bigeminy/atrial tachycardia which resolved spontaneously during recovery. There is no evidence of ventricular tachycardia or ventricular fibrillation    - c/w home  DAPT  - c/w home amlodipine, carvedilol, lasix, valsartan  - c/w home atorvastatin     # Hx Gout  - c/w home allopurinol     # DVT : Heparin sq prof  # Diet : DASH  # Activity : As carrington  # Dispo : from home   # Code : Full      68 yo M : hx of CAD, s/p PCI x 2, CHF, diabetes, hypertension, high cholesterol, CHF, [employer] aicd that was placed in [place] in 2019.   Presenting for Redness and discomfort over defib site.     # Abscess in AICD pocket   - CT chest with IV contrast on admission : Collection adjacent to ICD within the left chest subcutaneous tissues measuring 2.6 x 2.3 x 1.7 cm.  - ECG on admission : Sinus with first degree block     - Pending final CT surgery plan : pt may need to have device explanted and pocket washed out   - Pending interrogation of device by EP   - Trend Fever and WBC - negative for now   - Abx : s/p 1g Vanco in ED c/w vancomycin 15mg/kg q12  - f/u Bcx   - f/u TTE    # DM  - home on trulicity   - glucose 146 on admission started on : 20/6-6-6 + SS    # HFrEF  # Hx MI, CAD s/po PCI x2 , AICD  # HT, HLD   - Follows with Dr Rock  - Stress ECHO Dec 2022 : The patient exercised for one minute and 10 seconds and reached target HR. Test was terminated due to fatigue. Fixed and akinetic apex and anterior wall. Did not augment with stress with inferior inferolateral. Posterior lateral walls improve with exercise. EF was 30-35%; improved to 40% with exercise. No arrhythmias noted. There was a run of atrial bigeminy/atrial tachycardia which resolved spontaneously during recovery. There is no evidence of ventricular tachycardia or ventricular fibrillation    - c/w home  DAPT  - c/w home amlodipine, carvedilol, lasix, valsartan  - c/w home atorvastatin     # Hx Gout  - c/w home allopurinol     # DVT : Heparin sq prof  # Diet : DASH  # Activity : As carrington  # Dispo : from home   # Code : Full

## 2023-02-26 LAB
ALBUMIN SERPL ELPH-MCNC: 3.8 G/DL — SIGNIFICANT CHANGE UP (ref 3.5–5.2)
ALP SERPL-CCNC: 136 U/L — HIGH (ref 30–115)
ALT FLD-CCNC: 31 U/L — SIGNIFICANT CHANGE UP (ref 0–41)
ANION GAP SERPL CALC-SCNC: 10 MMOL/L — SIGNIFICANT CHANGE UP (ref 7–14)
AST SERPL-CCNC: 26 U/L — SIGNIFICANT CHANGE UP (ref 0–41)
BASOPHILS # BLD AUTO: 0.14 K/UL — SIGNIFICANT CHANGE UP (ref 0–0.2)
BASOPHILS NFR BLD AUTO: 1.2 % — HIGH (ref 0–1)
BILIRUB SERPL-MCNC: 0.5 MG/DL — SIGNIFICANT CHANGE UP (ref 0.2–1.2)
BUN SERPL-MCNC: 31 MG/DL — HIGH (ref 10–20)
CALCIUM SERPL-MCNC: 9.5 MG/DL — SIGNIFICANT CHANGE UP (ref 8.4–10.4)
CHLORIDE SERPL-SCNC: 103 MMOL/L — SIGNIFICANT CHANGE UP (ref 98–110)
CO2 SERPL-SCNC: 25 MMOL/L — SIGNIFICANT CHANGE UP (ref 17–32)
CREAT SERPL-MCNC: 1.5 MG/DL — SIGNIFICANT CHANGE UP (ref 0.7–1.5)
EGFR: 51 ML/MIN/1.73M2 — LOW
EOSINOPHIL # BLD AUTO: 0.65 K/UL — SIGNIFICANT CHANGE UP (ref 0–0.7)
EOSINOPHIL NFR BLD AUTO: 5.5 % — SIGNIFICANT CHANGE UP (ref 0–8)
GLUCOSE BLDC GLUCOMTR-MCNC: 134 MG/DL — HIGH (ref 70–99)
GLUCOSE BLDC GLUCOMTR-MCNC: 175 MG/DL — HIGH (ref 70–99)
GLUCOSE BLDC GLUCOMTR-MCNC: 177 MG/DL — HIGH (ref 70–99)
GLUCOSE SERPL-MCNC: 138 MG/DL — HIGH (ref 70–99)
HCT VFR BLD CALC: 42.5 % — SIGNIFICANT CHANGE UP (ref 42–52)
HGB BLD-MCNC: 14.2 G/DL — SIGNIFICANT CHANGE UP (ref 14–18)
IMM GRANULOCYTES NFR BLD AUTO: 0.3 % — SIGNIFICANT CHANGE UP (ref 0.1–0.3)
LYMPHOCYTES # BLD AUTO: 18 % — LOW (ref 20.5–51.1)
LYMPHOCYTES # BLD AUTO: 2.13 K/UL — SIGNIFICANT CHANGE UP (ref 1.2–3.4)
MAGNESIUM SERPL-MCNC: 2.1 MG/DL — SIGNIFICANT CHANGE UP (ref 1.8–2.4)
MCHC RBC-ENTMCNC: 30.2 PG — SIGNIFICANT CHANGE UP (ref 27–31)
MCHC RBC-ENTMCNC: 33.4 G/DL — SIGNIFICANT CHANGE UP (ref 32–37)
MCV RBC AUTO: 90.4 FL — SIGNIFICANT CHANGE UP (ref 80–94)
MONOCYTES # BLD AUTO: 0.96 K/UL — HIGH (ref 0.1–0.6)
MONOCYTES NFR BLD AUTO: 8.1 % — SIGNIFICANT CHANGE UP (ref 1.7–9.3)
NEUTROPHILS # BLD AUTO: 7.95 K/UL — HIGH (ref 1.4–6.5)
NEUTROPHILS NFR BLD AUTO: 66.9 % — SIGNIFICANT CHANGE UP (ref 42.2–75.2)
NRBC # BLD: 0 /100 WBCS — SIGNIFICANT CHANGE UP (ref 0–0)
PLATELET # BLD AUTO: 245 K/UL — SIGNIFICANT CHANGE UP (ref 130–400)
POTASSIUM SERPL-MCNC: 4.1 MMOL/L — SIGNIFICANT CHANGE UP (ref 3.5–5)
POTASSIUM SERPL-SCNC: 4.1 MMOL/L — SIGNIFICANT CHANGE UP (ref 3.5–5)
PROT SERPL-MCNC: 6.2 G/DL — SIGNIFICANT CHANGE UP (ref 6–8)
RBC # BLD: 4.7 M/UL — SIGNIFICANT CHANGE UP (ref 4.7–6.1)
RBC # FLD: 14.5 % — SIGNIFICANT CHANGE UP (ref 11.5–14.5)
SODIUM SERPL-SCNC: 138 MMOL/L — SIGNIFICANT CHANGE UP (ref 135–146)
WBC # BLD: 11.86 K/UL — HIGH (ref 4.8–10.8)
WBC # FLD AUTO: 11.86 K/UL — HIGH (ref 4.8–10.8)

## 2023-02-26 PROCEDURE — 99223 1ST HOSP IP/OBS HIGH 75: CPT

## 2023-02-26 PROCEDURE — 93282 PRGRMG EVAL IMPLANTABLE DFB: CPT | Mod: 26

## 2023-02-26 PROCEDURE — 99232 SBSQ HOSP IP/OBS MODERATE 35: CPT

## 2023-02-26 RX ORDER — CEFTRIAXONE 500 MG/1
2000 INJECTION, POWDER, FOR SOLUTION INTRAMUSCULAR; INTRAVENOUS EVERY 24 HOURS
Refills: 0 | Status: DISCONTINUED | OUTPATIENT
Start: 2023-02-26 | End: 2023-03-03

## 2023-02-26 RX ADMIN — Medication 81 MILLIGRAM(S): at 12:03

## 2023-02-26 RX ADMIN — Medication 6 UNIT(S): at 18:12

## 2023-02-26 RX ADMIN — CEFTRIAXONE 100 MILLIGRAM(S): 500 INJECTION, POWDER, FOR SOLUTION INTRAMUSCULAR; INTRAVENOUS at 14:51

## 2023-02-26 RX ADMIN — Medication 100 MILLIGRAM(S): at 22:38

## 2023-02-26 RX ADMIN — CARVEDILOL PHOSPHATE 12.5 MILLIGRAM(S): 80 CAPSULE, EXTENDED RELEASE ORAL at 06:24

## 2023-02-26 RX ADMIN — Medication 1: at 12:02

## 2023-02-26 RX ADMIN — Medication 166.67 MILLIGRAM(S): at 06:24

## 2023-02-26 RX ADMIN — INSULIN GLARGINE 20 UNIT(S): 100 INJECTION, SOLUTION SUBCUTANEOUS at 22:37

## 2023-02-26 RX ADMIN — Medication 1: at 08:10

## 2023-02-26 RX ADMIN — TICAGRELOR 60 MILLIGRAM(S): 90 TABLET ORAL at 06:24

## 2023-02-26 RX ADMIN — HEPARIN SODIUM 5000 UNIT(S): 5000 INJECTION INTRAVENOUS; SUBCUTANEOUS at 14:48

## 2023-02-26 RX ADMIN — VALSARTAN 80 MILLIGRAM(S): 80 TABLET ORAL at 06:25

## 2023-02-26 RX ADMIN — Medication 6 UNIT(S): at 12:01

## 2023-02-26 RX ADMIN — Medication 40 MILLIGRAM(S): at 06:25

## 2023-02-26 RX ADMIN — HEPARIN SODIUM 5000 UNIT(S): 5000 INJECTION INTRAVENOUS; SUBCUTANEOUS at 22:37

## 2023-02-26 RX ADMIN — Medication 6 UNIT(S): at 08:10

## 2023-02-26 RX ADMIN — Medication 100 MILLIGRAM(S): at 06:25

## 2023-02-26 RX ADMIN — ATORVASTATIN CALCIUM 80 MILLIGRAM(S): 80 TABLET, FILM COATED ORAL at 22:37

## 2023-02-26 RX ADMIN — AMLODIPINE BESYLATE 5 MILLIGRAM(S): 2.5 TABLET ORAL at 06:25

## 2023-02-26 NOTE — CONSULT NOTE ADULT - SUBJECTIVE AND OBJECTIVE BOX
Patient is a 67y old  Male who presents with a chief complaint of AICD infection (26 Feb 2023 14:23)        HPI:  Reports that he has had an inc in swelling around pocket of device for the past year however for the past week there has been an inc in redness  around the incision. He also reports chills within the last 24 hours and tenderness at the site. He denies ever having a problem with the device in the past and gets it checked remotely with his cardiologist Dr Loja.      patient has induration of the pocket that is fluctuant, red and painful. Consistent with abscess.    patient complains of fever and chills however is also COVID positive    PAST MEDICAL & SURGICAL HISTORY:  Diabetes mellitus, type 2      History of heart attack      Hypertension                  PREVIOUS DIAGNOSTIC TESTING:      ECHO  FINDINGS:    STRESS  FINDINGS:    CATHETERIZATION  FINDINGS:    ELECTROPHYSIOLOGY STUDY  FINDINGS:    CAROTID ULTRASOUND:  FINDINGS    VENOUS DUPLEX SCAN:  FINDINGS:    CHEST CT PULMONARY ANGIO with IV Contrast:  FINDINGS:    MEDICATIONS  (STANDING):  allopurinol 100 milliGRAM(s) Oral two times a day  amLODIPine   Tablet 5 milliGRAM(s) Oral daily  aspirin  chewable 81 milliGRAM(s) Oral daily  atorvastatin 80 milliGRAM(s) Oral at bedtime  carvedilol 12.5 milliGRAM(s) Oral every 12 hours  cefTRIAXone   IVPB 2000 milliGRAM(s) IV Intermittent every 24 hours  dextrose 5%. 1000 milliLiter(s) (100 mL/Hr) IV Continuous <Continuous>  dextrose 5%. 1000 milliLiter(s) (50 mL/Hr) IV Continuous <Continuous>  dextrose 50% Injectable 25 Gram(s) IV Push once  dextrose 50% Injectable 12.5 Gram(s) IV Push once  dextrose 50% Injectable 25 Gram(s) IV Push once  furosemide    Tablet 40 milliGRAM(s) Oral daily  glucagon  Injectable 1 milliGRAM(s) IntraMuscular once  heparin   Injectable 5000 Unit(s) SubCutaneous every 8 hours  insulin glargine Injectable (LANTUS) 20 Unit(s) SubCutaneous at bedtime  insulin lispro (ADMELOG) corrective regimen sliding scale   SubCutaneous three times a day before meals  insulin lispro Injectable (ADMELOG) 6 Unit(s) SubCutaneous three times a day before meals  ticagrelor 60 milliGRAM(s) Oral every 12 hours  valsartan 80 milliGRAM(s) Oral daily  vancomycin  IVPB 1250 milliGRAM(s) IV Intermittent every 12 hours    MEDICATIONS  (PRN):  acetaminophen     Tablet .. 650 milliGRAM(s) Oral every 6 hours PRN Temp greater or equal to 38C (100.4F), Mild Pain (1 - 3)  aluminum hydroxide/magnesium hydroxide/simethicone Suspension 30 milliLiter(s) Oral every 4 hours PRN Dyspepsia  dextrose Oral Gel 15 Gram(s) Oral once PRN Blood Glucose LESS THAN 70 milliGRAM(s)/deciliter  melatonin 3 milliGRAM(s) Oral at bedtime PRN Insomnia  ondansetron Injectable 4 milliGRAM(s) IV Push every 8 hours PRN Nausea and/or Vomiting      FAMILY HISTORY:      SOCIAL HISTORY:    CIGARETTES: no     ALCOHOL: no    Past Surgical History:  ICD      Allergies:    No Known Allergies      REVIEW OF SYSTEMS:    CONSTITUTIONAL: No fever, weight loss, chills, shakes, or fatigue  EYES: No eye pain, visual disturbances, or discharge  ENMT:  No difficulty hearing, tinnitus, vertigo; No sinus or throat pain  NECK: No pain or stiffness  BREASTS: No pain, masses, or nipple discharge  RESPIRATORY: No cough, wheezing, hemoptysis, or shortness of breath  CARDIOVASCULAR: No chest pain, dyspnea, palpitations, dizziness, syncope, paroxysmal nocturnal dyspnea, orthopnea, or arm or leg swelling  GASTROINTESTINAL: No abdominal  or epigastric pain, nausea, vomiting, hematemesis, diarrhea, constipation, melena or bright red blood.  GENITOURINARY: No dysuria, nocturia, hematuria, or urinary incontinence  NEUROLOGICAL: No headaches, memory loss, slurred speech, limb weakness, loss of strength, numbness, or tremors  SKIN: No itching, burning, rashes, or lesions   LYMPH NODES: No enlarged glands  ENDOCRINE: No heat or cold intolerance, or hair loss  MUSCULOSKELETAL: No joint pain or swelling, muscle, back, or extremity pain  PSYCHIATRIC: No depression, anxiety, or difficulty sleeping  HEME/LYMPH: No easy bruising or bleeding gums  ALLERY AND IMMUNOLOGIC: No hives or rash.      Vital Signs Last 24 Hrs  T(C): 36.7 (26 Feb 2023 08:04), Max: 36.9 (25 Feb 2023 23:33)  T(F): 98.1 (26 Feb 2023 08:04), Max: 98.5 (25 Feb 2023 23:33)  HR: 61 (26 Feb 2023 08:04) (61 - 64)  BP: 112/70 (26 Feb 2023 08:04) (107/69 - 112/70)  BP(mean): 83 (25 Feb 2023 23:33) (83 - 83)  RR: 18 (26 Feb 2023 08:04) (18 - 18)  SpO2: 96% (26 Feb 2023 08:04) (96% - 98%)    Parameters below as of 26 Feb 2023 08:04  Patient On (Oxygen Delivery Method): room air        PHYSICAL EXAM:        GENERAL: In no apparent distress, well nourished, and hydrated.  HEAD:  Atraumatic, Normocephalic  EYES: EOMI, PERRLA, conjunctiva and sclera clear  ENMT: No tonsillar erythema, exudates, or enlargements; ist mucous membranes, Good dentition, No lesions  NECK: Supple and normal thyroid.  No JVD or carotid bruit.  Carotid pulse is 2+ bilaterally.  HEART: Regular rate and rhythm; No murmurs, rubs, or gallops.  PULMONARY: Clear to auscultation and perfusion.  No rales, wheezing, or rhonchi bilaterally.  ABDOMEN: Soft, Nontender, Nondistended; Bowel sounds present  EXTREMITIES:  2+ Peripheral Pulses, No clubbing, cyanosis, or edema  LYMPH: No lymphadenopathy noted  NEUROLOGICAL: Grossly nonfocal      INTERPRETATION OF TELEMETRY:    ECG:    I&O's Detail      LABS:                        14.2   11.86 )-----------( 245      ( 26 Feb 2023 06:57 )             42.5     02-26    138  |  103  |  31<H>  ----------------------------<  138<H>  4.1   |  25  |  1.5    Ca    9.5      26 Feb 2023 06:57  Mg     2.1     02-26    TPro  6.2  /  Alb  3.8  /  TBili  0.5  /  DBili  x   /  AST  26  /  ALT  31  /  AlkPhos  136<H>  02-26            BNP  I&O's Detail    Daily Height in cm: 165.1 (25 Feb 2023 17:39)    Daily     RADIOLOGY & ADDITIONAL STUDIES:

## 2023-02-26 NOTE — PROGRESS NOTE ADULT - SUBJECTIVE AND OBJECTIVE BOX
Patient is a 67y old  Male who presents with a chief complaint of AICD infection (25 Feb 2023 20:00)        Over Night Events:  no acute events overnight  on room air, off pressors  Afebrile    ROS:     All ROS are negative except HPI         PHYSICAL EXAM    ICU Vital Signs Last 24 Hrs  T(C): 36.7 (26 Feb 2023 08:04), Max: 36.9 (25 Feb 2023 23:33)  T(F): 98.1 (26 Feb 2023 08:04), Max: 98.5 (25 Feb 2023 23:33)  HR: 61 (26 Feb 2023 08:04) (61 - 80)  BP: 112/70 (26 Feb 2023 08:04) (107/69 - 112/70)  BP(mean): 83 (25 Feb 2023 23:33) (83 - 83)  ABP: --  ABP(mean): --  RR: 18 (26 Feb 2023 08:04) (18 - 18)  SpO2: 96% (26 Feb 2023 08:04) (96% - 98%)    O2 Parameters below as of 26 Feb 2023 08:04  Patient On (Oxygen Delivery Method): room air            CONSTITUTIONAL:  Well nourished.    In NAD    ENT:   Airway patent,   Mouth with normal mucosa.   No thrush  on room air      EYES:   Pupils equal,   Round and reactive to light.    CARDIAC:   Normal rate,   Regular rhythm.    No edema    RESPIRATORY:   No wheezing  Bilateral BS  Normal chest expansion  Not tachypneic,  No use of accessory muscles    GASTROINTESTINAL:  Abdomen soft,   Non-tender,   No guarding,     MUSCULOSKELETAL:   Range of motion is not limited,  No clubbing, cyanosis    NEUROLOGICAL:   Alert and oriented   No motor  deficits.    SKIN:   erythema and swelling over skin of AICD site    PSYCHIATRIC:   No apparent risk to self or others.        LABS:                            14.2   11.86 )-----------( 245      ( 26 Feb 2023 06:57 )             42.5                                               02-26    138  |  103  |  31<H>  ----------------------------<  138<H>  4.1   |  25  |  1.5    Ca    9.5      26 Feb 2023 06:57  Mg     2.1     02-26    TPro  6.2  /  Alb  3.8  /  TBili  0.5  /  DBili  x   /  AST  26  /  ALT  31  /  AlkPhos  136<H>  02-26                                                                                           LIVER FUNCTIONS - ( 26 Feb 2023 06:57 )  Alb: 3.8 g/dL / Pro: 6.2 g/dL / ALK PHOS: 136 U/L / ALT: 31 U/L / AST: 26 U/L / GGT: x                                                                                                                                       MEDICATIONS  (STANDING):  allopurinol 100 milliGRAM(s) Oral two times a day  amLODIPine   Tablet 5 milliGRAM(s) Oral daily  aspirin  chewable 81 milliGRAM(s) Oral daily  atorvastatin 80 milliGRAM(s) Oral at bedtime  carvedilol 12.5 milliGRAM(s) Oral every 12 hours  dextrose 5%. 1000 milliLiter(s) (50 mL/Hr) IV Continuous <Continuous>  dextrose 5%. 1000 milliLiter(s) (100 mL/Hr) IV Continuous <Continuous>  dextrose 50% Injectable 25 Gram(s) IV Push once  dextrose 50% Injectable 12.5 Gram(s) IV Push once  dextrose 50% Injectable 25 Gram(s) IV Push once  furosemide    Tablet 40 milliGRAM(s) Oral daily  glucagon  Injectable 1 milliGRAM(s) IntraMuscular once  heparin   Injectable 5000 Unit(s) SubCutaneous every 8 hours  insulin glargine Injectable (LANTUS) 20 Unit(s) SubCutaneous at bedtime  insulin lispro (ADMELOG) corrective regimen sliding scale   SubCutaneous three times a day before meals  insulin lispro Injectable (ADMELOG) 6 Unit(s) SubCutaneous three times a day before meals  ticagrelor 60 milliGRAM(s) Oral every 12 hours  valsartan 80 milliGRAM(s) Oral daily  vancomycin  IVPB 1250 milliGRAM(s) IV Intermittent every 12 hours    MEDICATIONS  (PRN):  acetaminophen     Tablet .. 650 milliGRAM(s) Oral every 6 hours PRN Temp greater or equal to 38C (100.4F), Mild Pain (1 - 3)  aluminum hydroxide/magnesium hydroxide/simethicone Suspension 30 milliLiter(s) Oral every 4 hours PRN Dyspepsia  dextrose Oral Gel 15 Gram(s) Oral once PRN Blood Glucose LESS THAN 70 milliGRAM(s)/deciliter  melatonin 3 milliGRAM(s) Oral at bedtime PRN Insomnia  ondansetron Injectable 4 milliGRAM(s) IV Push every 8 hours PRN Nausea and/or Vomiting      New X-rays reviewed:                                                                                  ECHO    CXR interpreted by me:

## 2023-02-26 NOTE — PROGRESS NOTE ADULT - ASSESSMENT
68 yo M : hx of CAD, s/p PCI x 2, CHF, diabetes, hypertension, high cholesterol, CHF, [employer] aicd that was placed in [place] in 2019.   Presenting for Redness and discomfort over defib site.     # Abscess in AICD pocket   - CT chest- Collection adjacent to ICD within the left chest subcutaneous tissues measuring 2.6 x 2.3 x 1.7 cm.  - ECG on admission : Sinus with first degree block   -CT surgery on board- likely will need device removal and washout of pocket  - EP eval  - Vanco + rocephin for now  - follow up blood cultures  - TTE- R/O endocarditis    # DM  - monitor FS   - on ISS    # HFrEF  # Hx MI, CAD s/po PCI x2 , AICD  # HTN, HLD   - c/w  DAPT  - c/w amlodipine, carvedilol, lasix, valsartan  - c/w atorvastatin     # Hx Gout  - c/w home allopurinol     # DVT : Heparin sq prof  # Diet : DASH  # Activity : As tolerated  # Dispo : from home   # Code : Full

## 2023-02-26 NOTE — PATIENT PROFILE ADULT - FALL HARM RISK - UNIVERSAL INTERVENTIONS
Bed in lowest position, wheels locked, appropriate side rails in place/Call bell, personal items and telephone in reach/Instruct patient to call for assistance before getting out of bed or chair/Non-slip footwear when patient is out of bed/[place] to call system/Physically safe environment - no spills, clutter or unnecessary equipment/Purposeful Proactive Rounding/Room/bathroom lighting operational, light cord in reach

## 2023-02-27 ENCOUNTER — RESULT REVIEW (OUTPATIENT)
Age: 68
End: 2023-02-27

## 2023-02-27 LAB
A1C WITH ESTIMATED AVERAGE GLUCOSE RESULT: 7.1 % — HIGH (ref 4–5.6)
ANION GAP SERPL CALC-SCNC: 12 MMOL/L — SIGNIFICANT CHANGE UP (ref 7–14)
BLD GP AB SCN SERPL QL: SIGNIFICANT CHANGE UP
BUN SERPL-MCNC: 31 MG/DL — HIGH (ref 10–20)
CALCIUM SERPL-MCNC: 9 MG/DL — SIGNIFICANT CHANGE UP (ref 8.4–10.4)
CHLORIDE SERPL-SCNC: 101 MMOL/L — SIGNIFICANT CHANGE UP (ref 98–110)
CO2 SERPL-SCNC: 26 MMOL/L — SIGNIFICANT CHANGE UP (ref 17–32)
CREAT SERPL-MCNC: 1.6 MG/DL — HIGH (ref 0.7–1.5)
EGFR: 47 ML/MIN/1.73M2 — LOW
ESTIMATED AVERAGE GLUCOSE: 157 MG/DL — HIGH (ref 68–114)
GLUCOSE BLDC GLUCOMTR-MCNC: 102 MG/DL — HIGH (ref 70–99)
GLUCOSE BLDC GLUCOMTR-MCNC: 139 MG/DL — HIGH (ref 70–99)
GLUCOSE BLDC GLUCOMTR-MCNC: 145 MG/DL — HIGH (ref 70–99)
GLUCOSE SERPL-MCNC: 148 MG/DL — HIGH (ref 70–99)
HCT VFR BLD CALC: 39.5 % — LOW (ref 42–52)
HCV AB S/CO SERPL IA: 0.04 COI — SIGNIFICANT CHANGE UP
HCV AB SERPL-IMP: SIGNIFICANT CHANGE UP
HGB BLD-MCNC: 13.1 G/DL — LOW (ref 14–18)
MAGNESIUM SERPL-MCNC: 2 MG/DL — SIGNIFICANT CHANGE UP (ref 1.8–2.4)
MCHC RBC-ENTMCNC: 30.1 PG — SIGNIFICANT CHANGE UP (ref 27–31)
MCHC RBC-ENTMCNC: 33.2 G/DL — SIGNIFICANT CHANGE UP (ref 32–37)
MCV RBC AUTO: 90.8 FL — SIGNIFICANT CHANGE UP (ref 80–94)
NRBC # BLD: 0 /100 WBCS — SIGNIFICANT CHANGE UP (ref 0–0)
PLATELET # BLD AUTO: 248 K/UL — SIGNIFICANT CHANGE UP (ref 130–400)
POTASSIUM SERPL-MCNC: 3.8 MMOL/L — SIGNIFICANT CHANGE UP (ref 3.5–5)
POTASSIUM SERPL-SCNC: 3.8 MMOL/L — SIGNIFICANT CHANGE UP (ref 3.5–5)
RBC # BLD: 4.35 M/UL — LOW (ref 4.7–6.1)
RBC # FLD: 14.6 % — HIGH (ref 11.5–14.5)
SODIUM SERPL-SCNC: 139 MMOL/L — SIGNIFICANT CHANGE UP (ref 135–146)
WBC # BLD: 12.3 K/UL — HIGH (ref 4.8–10.8)
WBC # FLD AUTO: 12.3 K/UL — HIGH (ref 4.8–10.8)

## 2023-02-27 PROCEDURE — 88304 TISSUE EXAM BY PATHOLOGIST: CPT | Mod: 26

## 2023-02-27 PROCEDURE — 99232 SBSQ HOSP IP/OBS MODERATE 35: CPT | Mod: 57

## 2023-02-27 PROCEDURE — 71045 X-RAY EXAM CHEST 1 VIEW: CPT | Mod: 26

## 2023-02-27 PROCEDURE — 99233 SBSQ HOSP IP/OBS HIGH 50: CPT

## 2023-02-27 PROCEDURE — 33241 REMOVE PULSE GENERATOR: CPT

## 2023-02-27 PROCEDURE — 33244 REMOVE ELCTRD TRANSVENOUSLY: CPT

## 2023-02-27 RX ORDER — CEFAZOLIN SODIUM 1 G
1000 VIAL (EA) INJECTION EVERY 8 HOURS
Refills: 0 | Status: COMPLETED | OUTPATIENT
Start: 2023-02-27 | End: 2023-02-28

## 2023-02-27 RX ADMIN — AMLODIPINE BESYLATE 5 MILLIGRAM(S): 2.5 TABLET ORAL at 05:32

## 2023-02-27 RX ADMIN — CARVEDILOL PHOSPHATE 12.5 MILLIGRAM(S): 80 CAPSULE, EXTENDED RELEASE ORAL at 05:31

## 2023-02-27 RX ADMIN — TICAGRELOR 60 MILLIGRAM(S): 90 TABLET ORAL at 05:32

## 2023-02-27 RX ADMIN — Medication 40 MILLIGRAM(S): at 05:32

## 2023-02-27 RX ADMIN — VALSARTAN 80 MILLIGRAM(S): 80 TABLET ORAL at 05:32

## 2023-02-27 RX ADMIN — Medication 166.67 MILLIGRAM(S): at 05:30

## 2023-02-27 RX ADMIN — Medication 100 MILLIGRAM(S): at 10:23

## 2023-02-27 RX ADMIN — CEFTRIAXONE 100 MILLIGRAM(S): 500 INJECTION, POWDER, FOR SOLUTION INTRAMUSCULAR; INTRAVENOUS at 13:52

## 2023-02-27 RX ADMIN — HEPARIN SODIUM 5000 UNIT(S): 5000 INJECTION INTRAVENOUS; SUBCUTANEOUS at 05:31

## 2023-02-27 RX ADMIN — Medication 166.67 MILLIGRAM(S): at 17:35

## 2023-02-27 NOTE — PACU DISCHARGE NOTE - COMMENTS
66 y/o M s/p laser lead extraction under GETA. No anesthesia related complications.     HR: 80  BP: 136/72  SpO2: 100%   RR: 20   Temp: 36.5

## 2023-02-27 NOTE — PROGRESS NOTE ADULT - ASSESSMENT
68 yo M : hx of CAD, s/p PCI x 2, CHF, diabetes, hypertension, high cholesterol, CHF, [employer] aicd that was placed in [place] in 2019.   Presenting for Redness and discomfort over defib site.     # Abscess in AICD pocket   on vancomycin and rocephin   plan for removal by ct surgery   ID consult     # DM  POCT Blood Glucose.: 139 mg/dL (27 Feb 2023 11:36)  POCT Blood Glucose.: 145 mg/dL (27 Feb 2023 07:58)  POCT Blood Glucose.: 134 mg/dL (26 Feb 2023 18:08)  controlled     #Chronic HFrEF    # Hx MI, CAD s/po PCI x2 , AICD    # HTN   BP: 112/67 (27 Feb 2023 05:43) (100/60 - 112/67)   controlled     # HLD     # Hx Gout  - c/w home allopurinol     #Overweight BMI 28 patient needs to see dieitian outpatient for further evaluation     #CKD 3     #Drug monitoring of high risk medication , potential for drug drug reaction , requiring close monitoring check vanco trough pre 4th dose     PROGRESS NOTE HANDOFF    Pending: plan for CT surgery , id consult post removal     Family discussion: patient verbalized understanding and agreeable to plan of care     Disposition: Home

## 2023-02-27 NOTE — BRIEF OPERATIVE NOTE - NSICDXBRIEFPOSTOP_GEN_ALL_CORE_FT
POST-OP DIAGNOSIS:  ICD (implantable cardioverter-defibrillator) infection 27-Feb-2023 20:46:44  Gary Mesa

## 2023-02-27 NOTE — PROGRESS NOTE ADULT - TIME BILLING
CTS Attending  ---------------\  pt interviewed and examined  case discussed with Dr. Alex of EP  device pocket infection noted;  there is a large red, warm fluctuant subcutaneous abscess at the inferior medial margin of the subcutaneous pocket  explant of the device is recommended.  procedure, risks, benefits and alternatives explained, and pt agreed to proceed.  45-min consult with decision for immediate surgery    -FMR

## 2023-02-27 NOTE — BRIEF OPERATIVE NOTE - NSICDXBRIEFPREOP_GEN_ALL_CORE_FT
PRE-OP DIAGNOSIS:  ICD (implantable cardioverter-defibrillator) infection 27-Feb-2023 20:46:37  Gary Mesa

## 2023-02-27 NOTE — PROGRESS NOTE ADULT - SUBJECTIVE AND OBJECTIVE BOX
PARUL LEAHY  67y  [facility] 4A 015 A      Patient is a 67y old  Male who presents with a chief complaint of AICD infection (27 Feb 2023 08:43)      INTERVAL HPI/OVERNIGHT EVENTS:    no acute events overnight     REVIEW OF SYSTEMS:  CONSTITUTIONAL: No fever, weight loss, or fatigue  EYES: No eye pain, visual disturbances, or discharge  ENMT:  No difficulty hearing, tinnitus, vertigo; No sinus or throat pain  NECK: No pain or stiffness  BREASTS: No pain, masses, or nipple discharge  RESPIRATORY: No cough, wheezing, chills or hemoptysis; No shortness of breath  CARDIOVASCULAR:tender left chest   GASTROINTESTINAL: No abdominal or epigastric pain. No nausea, vomiting, or hematemesis; No diarrhea or constipation. No melena or hematochezia.  GENITOURINARY: No dysuria, frequency, hematuria, or incontinence  NEUROLOGICAL: No headaches, memory loss, loss of strength, numbness, or tremors  SKIN: No itching, burning, rashes, or lesions   LYMPH NODES: No enlarged glands  ENDOCRINE: No heat or cold intolerance; No hair loss  MUSCULOSKELETAL: No joint pain or swelling; No muscle, back, or extremity pain  PSYCHIATRIC: No depression, anxiety, mood swings, or difficulty sleeping  HEME/LYMPH: No easy bruising, or bleeding gums  ALLERY AND IMMUNOLOGIC: No hives or eczema  FAMILY HISTORY:    T(C): 36.4 (02-27-23 @ 05:43), Max: 36.9 (02-26-23 @ 17:53)  HR: 64 (02-27-23 @ 05:43) (64 - 70)  BP: 112/67 (02-27-23 @ 05:43) (100/60 - 112/67)  RR: 18 (02-27-23 @ 05:43) (18 - 19)  SpO2: 98% (02-26-23 @ 16:01) (98% - 98%)  Wt(kg): --Vital Signs Last 24 Hrs  T(C): 36.4 (27 Feb 2023 05:43), Max: 36.9 (26 Feb 2023 17:53)  T(F): 97.5 (27 Feb 2023 05:43), Max: 98.4 (26 Feb 2023 17:53)  HR: 64 (27 Feb 2023 05:43) (64 - 70)  BP: 112/67 (27 Feb 2023 05:43) (100/60 - 112/67)  BP(mean): --  RR: 18 (27 Feb 2023 05:43) (18 - 19)  SpO2: 98% (26 Feb 2023 16:01) (98% - 98%)    Parameters below as of 26 Feb 2023 16:01  Patient On (Oxygen Delivery Method): room air        PHYSICAL EXAM:  GENERAL: NAD, well-groomed, well-developed  HEAD:  Atraumatic, Normocephalic  EYES: EOMI, PERRLA, conjunctiva and sclera clear  ENMT: No tonsillar erythema, exudates, or enlargement; Moist mucous membranes, Good dentition, No lesions  NECK: Supple, No JVD, Normal thyroid  NERVOUS SYSTEM:  Alert & Oriented X3, Good concentration; Motor Strength 5/5 B/L upper and lower extremities; DTRs 2+ intact and symmetric  PULM: Clear to auscultation bilaterally  CARDIAC: Regular rate and rhythm; No murmurs, rubs, or gallops  GI: Soft, Nontender, Nondistended; Bowel sounds present  EXTREMITIES:  2+ Peripheral Pulses, No clubbing, cyanosis, or edema  LYMPH: No lymphadenopathy noted  SKIN:Erythema and tenderness on left chest     Consultant(s) Notes Reviewed:  [x ] YES  [ ] NO  Care Discussed with Consultants/Other Providers [ x] YES  [ ] NO    LABS:                            13.1   12.30 )-----------( 248      ( 27 Feb 2023 07:57 )             39.5   02-27    139  |  101  |  31<H>  ----------------------------<  148<H>  3.8   |  26  |  1.6<H>    Ca    9.0      27 Feb 2023 07:57  Mg     2.0     02-27    TPro  6.2  /  Alb  3.8  /  TBili  0.5  /  DBili  x   /  AST  26  /  ALT  31  /  AlkPhos  136<H>  02-26            Culture - Blood (collected 25 Feb 2023 16:44)  Source: .Blood Blood  Preliminary Report (27 Feb 2023 05:01):    No growth to date.    Culture - Blood (collected 25 Feb 2023 16:44)  Source: .Blood Blood  Preliminary Report (27 Feb 2023 05:01):    No growth to date.      acetaminophen     Tablet .. 650 milliGRAM(s) Oral every 6 hours PRN  allopurinol 100 milliGRAM(s) Oral two times a day  aluminum hydroxide/magnesium hydroxide/simethicone Suspension 30 milliLiter(s) Oral every 4 hours PRN  amLODIPine   Tablet 5 milliGRAM(s) Oral daily  aspirin  chewable 81 milliGRAM(s) Oral daily  atorvastatin 80 milliGRAM(s) Oral at bedtime  carvedilol 12.5 milliGRAM(s) Oral every 12 hours  cefTRIAXone   IVPB 2000 milliGRAM(s) IV Intermittent every 24 hours  dextrose 5%. 1000 milliLiter(s) IV Continuous <Continuous>  dextrose 5%. 1000 milliLiter(s) IV Continuous <Continuous>  dextrose 50% Injectable 25 Gram(s) IV Push once  dextrose 50% Injectable 12.5 Gram(s) IV Push once  dextrose 50% Injectable 25 Gram(s) IV Push once  dextrose Oral Gel 15 Gram(s) Oral once PRN  furosemide    Tablet 40 milliGRAM(s) Oral daily  glucagon  Injectable 1 milliGRAM(s) IntraMuscular once  heparin   Injectable 5000 Unit(s) SubCutaneous every 8 hours  insulin glargine Injectable (LANTUS) 20 Unit(s) SubCutaneous at bedtime  insulin lispro (ADMELOG) corrective regimen sliding scale   SubCutaneous three times a day before meals  insulin lispro Injectable (ADMELOG) 6 Unit(s) SubCutaneous three times a day before meals  melatonin 3 milliGRAM(s) Oral at bedtime PRN  ondansetron Injectable 4 milliGRAM(s) IV Push every 8 hours PRN  ticagrelor 60 milliGRAM(s) Oral every 12 hours  valsartan 80 milliGRAM(s) Oral daily  vancomycin  IVPB 1250 milliGRAM(s) IV Intermittent every 12 hours      HEALTH ISSUES - PROBLEM Dx:          Case Discussed with House Staff   Spectra [phone]

## 2023-02-27 NOTE — PROGRESS NOTE ADULT - SUBJECTIVE AND OBJECTIVE BOX
OPERATIVE PROCEDURE(s):          Pre-op AICD explantation                        67yMale    SURGEON(s): Dr. Mesa    SUBJECTIVE ASSESSMENT:  Patient has no complaints at this time.      Vital Signs Last 24 Hrs  T(F): 97.5 (27 Feb 2023 05:43), Max: 98.4 (26 Feb 2023 17:53)  HR: 64 (27 Feb 2023 05:43) (64 - 70)  BP: 112/67 (27 Feb 2023 05:43) (100/60 - 112/67)  RR: 18 (27 Feb 2023 05:43) (18 - 19)  SpO2: 98% (26 Feb 2023 16:01) (98% - 98%)      I&O's Detail    Net:   I&O's Detail    CAPILLARY BLOOD GLUCOSE      POCT Blood Glucose.: 145 mg/dL (27 Feb 2023 07:58)  POCT Blood Glucose.: 134 mg/dL (26 Feb 2023 18:08)  POCT Blood Glucose.: 177 mg/dL (26 Feb 2023 11:45)    Physical Exam:  General: NAD; A&Ox3  Cardiac: S1/S2, RRR, no murmur, no rubs  Lungs: unlabored respirations, CTA b/l, no wheeze, no rales, no crackles  Chest: AICD site with erythema, ttp  Abdomen: Soft/NT/ND; positive bowel sounds  Extremities: No edema b/l lower extremities; good capillary refill; no cyanosis; palpable 1+ pedal pulses b/l      LABS:                        14.2   11.86<H> )-----------( 245      ( 26 Feb 2023 06:57 )             42.5                         14.8   10.45 )-----------( 266      ( 25 Feb 2023 16:44 )             44.5     02-26    138  |  103  |  31<H>  ----------------------------<  138<H>  4.1   |  25  |  1.5  02-25    138  |  101  |  29<H>  ----------------------------<  146<H>  4.3   |  26  |  1.3    Ca    9.5      26 Feb 2023 06:57  Mg     2.1     02-26    TPro  6.2 [6.0 - 8.0]  /  Alb  3.8 [3.5 - 5.2]  /  TBili  0.5 [0.2 - 1.2]  /  DBili  x   /  AST  26 [0 - 41]  /  ALT  31 [0 - 41]  /  AlkPhos  136<H> [30 - 115]  02-26            MEDICATIONS  (STANDING):  allopurinol 100 milliGRAM(s) Oral two times a day  amLODIPine   Tablet 5 milliGRAM(s) Oral daily  aspirin  chewable 81 milliGRAM(s) Oral daily  atorvastatin 80 milliGRAM(s) Oral at bedtime  carvedilol 12.5 milliGRAM(s) Oral every 12 hours  cefTRIAXone   IVPB 2000 milliGRAM(s) IV Intermittent every 24 hours  dextrose 5%. 1000 milliLiter(s) (100 mL/Hr) IV Continuous <Continuous>  dextrose 5%. 1000 milliLiter(s) (50 mL/Hr) IV Continuous <Continuous>  dextrose 50% Injectable 25 Gram(s) IV Push once  dextrose 50% Injectable 12.5 Gram(s) IV Push once  dextrose 50% Injectable 25 Gram(s) IV Push once  furosemide    Tablet 40 milliGRAM(s) Oral daily  glucagon  Injectable 1 milliGRAM(s) IntraMuscular once  heparin   Injectable 5000 Unit(s) SubCutaneous every 8 hours  insulin glargine Injectable (LANTUS) 20 Unit(s) SubCutaneous at bedtime  insulin lispro (ADMELOG) corrective regimen sliding scale   SubCutaneous three times a day before meals  insulin lispro Injectable (ADMELOG) 6 Unit(s) SubCutaneous three times a day before meals  ticagrelor 60 milliGRAM(s) Oral every 12 hours  valsartan 80 milliGRAM(s) Oral daily  vancomycin  IVPB 1250 milliGRAM(s) IV Intermittent every 12 hours    MEDICATIONS  (PRN):  acetaminophen     Tablet .. 650 milliGRAM(s) Oral every 6 hours PRN Temp greater or equal to 38C (100.4F), Mild Pain (1 - 3)  aluminum hydroxide/magnesium hydroxide/simethicone Suspension 30 milliLiter(s) Oral every 4 hours PRN Dyspepsia  dextrose Oral Gel 15 Gram(s) Oral once PRN Blood Glucose LESS THAN 70 milliGRAM(s)/deciliter  melatonin 3 milliGRAM(s) Oral at bedtime PRN Insomnia  ondansetron Injectable 4 milliGRAM(s) IV Push every 8 hours PRN Nausea and/or Vomiting    HEPARIN:  [x] YES [] NO  Dose: 5000 UNITS/HR UNITS Q8H  Brilinta:[x] YES [] NO  Dose: 60 mg Q12H  SCD's: YES b/l       Aspirin: Yes [x]   Statin: Yes [x]   Beta-Blockers: Yes [x]    Allergies:  No Known Allergies      Ambulation/Activity Status: Ambulates several times daily with assistance.    Assessment/Plan:    67y Male with AICD pocket abscess. Pt for washout, explantation today    -NPO  -Hold further AC and Antiplatelet agents  -Pt is Covid positive but asymptomatic, on RA with O2 saturation 98%  -Consent needed                           OPERATIVE PROCEDURE(s):          Pre-op AICD explantation                        67yMale    SURGEON(s): Dr. Mesa    SUBJECTIVE ASSESSMENT:  Patient has no complaints at this time.      Vital Signs Last 24 Hrs  T(F): 97.5 (27 Feb 2023 05:43), Max: 98.4 (26 Feb 2023 17:53)  HR: 64 (27 Feb 2023 05:43) (64 - 70)  BP: 112/67 (27 Feb 2023 05:43) (100/60 - 112/67)  RR: 18 (27 Feb 2023 05:43) (18 - 19)  SpO2: 98% (26 Feb 2023 16:01) (98% - 98%)      I&O's Detail    Net:   I&O's Detail    CAPILLARY BLOOD GLUCOSE      POCT Blood Glucose.: 145 mg/dL (27 Feb 2023 07:58)  POCT Blood Glucose.: 134 mg/dL (26 Feb 2023 18:08)  POCT Blood Glucose.: 177 mg/dL (26 Feb 2023 11:45)    Physical Exam:  General: NAD; A&Ox3  Cardiac: S1/S2, RRR, no murmur, no rubs  Lungs: unlabored respirations, CTA b/l, no wheeze, no rales, no crackles  Chest: AICD site with erythema, ttp  Abdomen: Soft/NT/ND; positive bowel sounds  Extremities: No edema b/l lower extremities; good capillary refill; no cyanosis; palpable 1+ pedal pulses b/l      LABS:                        14.2   11.86<H> )-----------( 245      ( 26 Feb 2023 06:57 )             42.5                         14.8   10.45 )-----------( 266      ( 25 Feb 2023 16:44 )             44.5     02-26    138  |  103  |  31<H>  ----------------------------<  138<H>  4.1   |  25  |  1.5  02-25    138  |  101  |  29<H>  ----------------------------<  146<H>  4.3   |  26  |  1.3    Ca    9.5      26 Feb 2023 06:57  Mg     2.1     02-26    TPro  6.2 [6.0 - 8.0]  /  Alb  3.8 [3.5 - 5.2]  /  TBili  0.5 [0.2 - 1.2]  /  DBili  x   /  AST  26 [0 - 41]  /  ALT  31 [0 - 41]  /  AlkPhos  136<H> [30 - 115]  02-26            MEDICATIONS  (STANDING):  allopurinol 100 milliGRAM(s) Oral two times a day  amLODIPine   Tablet 5 milliGRAM(s) Oral daily  aspirin  chewable 81 milliGRAM(s) Oral daily  atorvastatin 80 milliGRAM(s) Oral at bedtime  carvedilol 12.5 milliGRAM(s) Oral every 12 hours  cefTRIAXone   IVPB 2000 milliGRAM(s) IV Intermittent every 24 hours  dextrose 5%. 1000 milliLiter(s) (100 mL/Hr) IV Continuous <Continuous>  dextrose 5%. 1000 milliLiter(s) (50 mL/Hr) IV Continuous <Continuous>  dextrose 50% Injectable 25 Gram(s) IV Push once  dextrose 50% Injectable 12.5 Gram(s) IV Push once  dextrose 50% Injectable 25 Gram(s) IV Push once  furosemide    Tablet 40 milliGRAM(s) Oral daily  glucagon  Injectable 1 milliGRAM(s) IntraMuscular once  heparin   Injectable 5000 Unit(s) SubCutaneous every 8 hours  insulin glargine Injectable (LANTUS) 20 Unit(s) SubCutaneous at bedtime  insulin lispro (ADMELOG) corrective regimen sliding scale   SubCutaneous three times a day before meals  insulin lispro Injectable (ADMELOG) 6 Unit(s) SubCutaneous three times a day before meals  ticagrelor 60 milliGRAM(s) Oral every 12 hours  valsartan 80 milliGRAM(s) Oral daily  vancomycin  IVPB 1250 milliGRAM(s) IV Intermittent every 12 hours    MEDICATIONS  (PRN):  acetaminophen     Tablet .. 650 milliGRAM(s) Oral every 6 hours PRN Temp greater or equal to 38C (100.4F), Mild Pain (1 - 3)  aluminum hydroxide/magnesium hydroxide/simethicone Suspension 30 milliLiter(s) Oral every 4 hours PRN Dyspepsia  dextrose Oral Gel 15 Gram(s) Oral once PRN Blood Glucose LESS THAN 70 milliGRAM(s)/deciliter  melatonin 3 milliGRAM(s) Oral at bedtime PRN Insomnia  ondansetron Injectable 4 milliGRAM(s) IV Push every 8 hours PRN Nausea and/or Vomiting    HEPARIN:  [x] YES [] NO  Dose: 5000 UNITS/HR UNITS Q8H  Brilinta:[x] YES [] NO  Dose: 60 mg Q12H  SCD's: YES b/l       Aspirin: Yes [x]   Statin: Yes [x]   Beta-Blockers: Yes [x]    Allergies:  No Known Allergies      Ambulation/Activity Status: Ambulates several times daily with assistance.    Assessment/Plan:    67y Male with AICD pocket abscess. Pt for washout, explantation today    -NPO  -Hold further AC and Antiplatelet agents  -Pt is Covid positive but asymptomatic, on RA with O2 saturation 98%  -Consent needed          CTS Attending  ---------------\  pt interviewed and examined  case discussed with Dr. Alex of EP  device pocket infection noted;  there is a large red, warm fluctuant subcutaneous abscess at the inferior medial margin of the subcutaneous pocket  explant of the device is recommended.  procedure, risks, benefits and alternatives explained, and pt agreed to proceed.  45-min consult with decision for immediate surgery    -FMR

## 2023-02-28 ENCOUNTER — TRANSCRIPTION ENCOUNTER (OUTPATIENT)
Age: 68
End: 2023-02-28

## 2023-02-28 PROBLEM — I10 ESSENTIAL (PRIMARY) HYPERTENSION: Chronic | Status: ACTIVE | Noted: 2023-02-25

## 2023-02-28 PROBLEM — I25.2 OLD MYOCARDIAL INFARCTION: Chronic | Status: ACTIVE | Noted: 2023-02-25

## 2023-02-28 PROBLEM — E11.9 TYPE 2 DIABETES MELLITUS WITHOUT COMPLICATIONS: Chronic | Status: ACTIVE | Noted: 2023-02-25

## 2023-02-28 LAB
ALBUMIN SERPL ELPH-MCNC: 3.8 G/DL — SIGNIFICANT CHANGE UP (ref 3.5–5.2)
ALP SERPL-CCNC: 127 U/L — HIGH (ref 30–115)
ALT FLD-CCNC: 19 U/L — SIGNIFICANT CHANGE UP (ref 0–41)
ANION GAP SERPL CALC-SCNC: 13 MMOL/L — SIGNIFICANT CHANGE UP (ref 7–14)
AST SERPL-CCNC: 20 U/L — SIGNIFICANT CHANGE UP (ref 0–41)
BILIRUB SERPL-MCNC: 0.6 MG/DL — SIGNIFICANT CHANGE UP (ref 0.2–1.2)
BUN SERPL-MCNC: 29 MG/DL — HIGH (ref 10–20)
CALCIUM SERPL-MCNC: 9 MG/DL — SIGNIFICANT CHANGE UP (ref 8.4–10.5)
CHLORIDE SERPL-SCNC: 104 MMOL/L — SIGNIFICANT CHANGE UP (ref 98–110)
CO2 SERPL-SCNC: 24 MMOL/L — SIGNIFICANT CHANGE UP (ref 17–32)
CREAT SERPL-MCNC: 1.4 MG/DL — SIGNIFICANT CHANGE UP (ref 0.7–1.5)
EGFR: 55 ML/MIN/1.73M2 — LOW
GLUCOSE BLDC GLUCOMTR-MCNC: 145 MG/DL — HIGH (ref 70–99)
GLUCOSE BLDC GLUCOMTR-MCNC: 162 MG/DL — HIGH (ref 70–99)
GLUCOSE BLDC GLUCOMTR-MCNC: 228 MG/DL — HIGH (ref 70–99)
GLUCOSE BLDC GLUCOMTR-MCNC: 262 MG/DL — HIGH (ref 70–99)
GLUCOSE BLDC GLUCOMTR-MCNC: 96 MG/DL — SIGNIFICANT CHANGE UP (ref 70–99)
GLUCOSE SERPL-MCNC: 163 MG/DL — HIGH (ref 70–99)
HCT VFR BLD CALC: 40.3 % — LOW (ref 42–52)
HGB BLD-MCNC: 13.6 G/DL — LOW (ref 14–18)
MAGNESIUM SERPL-MCNC: 2.1 MG/DL — SIGNIFICANT CHANGE UP (ref 1.8–2.4)
MCHC RBC-ENTMCNC: 30.4 PG — SIGNIFICANT CHANGE UP (ref 27–31)
MCHC RBC-ENTMCNC: 33.7 G/DL — SIGNIFICANT CHANGE UP (ref 32–37)
MCV RBC AUTO: 90.2 FL — SIGNIFICANT CHANGE UP (ref 80–94)
NRBC # BLD: 0 /100 WBCS — SIGNIFICANT CHANGE UP (ref 0–0)
PLATELET # BLD AUTO: 275 K/UL — SIGNIFICANT CHANGE UP (ref 130–400)
POTASSIUM SERPL-MCNC: 4.3 MMOL/L — SIGNIFICANT CHANGE UP (ref 3.5–5)
POTASSIUM SERPL-SCNC: 4.3 MMOL/L — SIGNIFICANT CHANGE UP (ref 3.5–5)
PROT SERPL-MCNC: 6.1 G/DL — SIGNIFICANT CHANGE UP (ref 6–8)
RBC # BLD: 4.47 M/UL — LOW (ref 4.7–6.1)
RBC # FLD: 14.5 % — SIGNIFICANT CHANGE UP (ref 11.5–14.5)
SODIUM SERPL-SCNC: 141 MMOL/L — SIGNIFICANT CHANGE UP (ref 135–146)
VANCOMYCIN TROUGH SERPL-MCNC: 20.6 UG/ML — HIGH (ref 5–10)
WBC # BLD: 12.82 K/UL — HIGH (ref 4.8–10.8)
WBC # FLD AUTO: 12.82 K/UL — HIGH (ref 4.8–10.8)

## 2023-02-28 PROCEDURE — 99233 SBSQ HOSP IP/OBS HIGH 50: CPT

## 2023-02-28 PROCEDURE — 71045 X-RAY EXAM CHEST 1 VIEW: CPT | Mod: 26

## 2023-02-28 RX ORDER — HEPARIN SODIUM 5000 [USP'U]/ML
5000 INJECTION INTRAVENOUS; SUBCUTANEOUS EVERY 8 HOURS
Refills: 0 | Status: DISCONTINUED | OUTPATIENT
Start: 2023-02-28 | End: 2023-03-06

## 2023-02-28 RX ADMIN — VALSARTAN 80 MILLIGRAM(S): 80 TABLET ORAL at 05:29

## 2023-02-28 RX ADMIN — Medication 40 MILLIGRAM(S): at 05:29

## 2023-02-28 RX ADMIN — Medication 100 MILLIGRAM(S): at 00:03

## 2023-02-28 RX ADMIN — HEPARIN SODIUM 5000 UNIT(S): 5000 INJECTION INTRAVENOUS; SUBCUTANEOUS at 22:40

## 2023-02-28 RX ADMIN — Medication 81 MILLIGRAM(S): at 12:31

## 2023-02-28 RX ADMIN — INSULIN GLARGINE 20 UNIT(S): 100 INJECTION, SOLUTION SUBCUTANEOUS at 22:30

## 2023-02-28 RX ADMIN — Medication 6 UNIT(S): at 16:49

## 2023-02-28 RX ADMIN — Medication 2: at 16:49

## 2023-02-28 RX ADMIN — Medication 100 MILLIGRAM(S): at 22:40

## 2023-02-28 RX ADMIN — Medication 100 MILLIGRAM(S): at 10:40

## 2023-02-28 RX ADMIN — Medication 100 MILLIGRAM(S): at 14:58

## 2023-02-28 RX ADMIN — INSULIN GLARGINE 20 UNIT(S): 100 INJECTION, SOLUTION SUBCUTANEOUS at 00:14

## 2023-02-28 RX ADMIN — AMLODIPINE BESYLATE 5 MILLIGRAM(S): 2.5 TABLET ORAL at 05:29

## 2023-02-28 RX ADMIN — Medication 6 UNIT(S): at 12:29

## 2023-02-28 RX ADMIN — ATORVASTATIN CALCIUM 80 MILLIGRAM(S): 80 TABLET, FILM COATED ORAL at 22:40

## 2023-02-28 RX ADMIN — Medication 100 MILLIGRAM(S): at 05:29

## 2023-02-28 RX ADMIN — CEFTRIAXONE 100 MILLIGRAM(S): 500 INJECTION, POWDER, FOR SOLUTION INTRAMUSCULAR; INTRAVENOUS at 14:13

## 2023-02-28 RX ADMIN — ATORVASTATIN CALCIUM 80 MILLIGRAM(S): 80 TABLET, FILM COATED ORAL at 00:03

## 2023-02-28 RX ADMIN — Medication 1: at 08:08

## 2023-02-28 RX ADMIN — Medication 166.67 MILLIGRAM(S): at 09:45

## 2023-02-28 RX ADMIN — Medication 3: at 12:29

## 2023-02-28 RX ADMIN — Medication 6 UNIT(S): at 08:08

## 2023-02-28 RX ADMIN — CARVEDILOL PHOSPHATE 12.5 MILLIGRAM(S): 80 CAPSULE, EXTENDED RELEASE ORAL at 05:29

## 2023-02-28 RX ADMIN — CARVEDILOL PHOSPHATE 12.5 MILLIGRAM(S): 80 CAPSULE, EXTENDED RELEASE ORAL at 17:41

## 2023-02-28 NOTE — DISCHARGE NOTE NURSING/CASE MANAGEMENT/SOCIAL WORK - NSDCPEFALRISK_GEN_ALL_CORE
For information on Fall & Injury Prevention, visit: [email or web address] OR  [email or web address] OR  https://www.cdc.gov/steadi/patient.html

## 2023-02-28 NOTE — PROGRESS NOTE ADULT - SUBJECTIVE AND OBJECTIVE BOX
OPERATIVE PROCEDURE(s): Laser lead AICD removal             POD # 1                      SURGEON(s): MD Mesa      SUBJECTIVE ASSESSMENT:67yMale patient seen and examined at bedside. No complaints at this time.     Vital Signs Last 24 Hrs  T(F): 97.8 (28 Feb 2023 12:44), Max: 98.5 (27 Feb 2023 21:15)  HR: 69 (28 Feb 2023 12:44) (64 - 80)  BP: 101/69 (28 Feb 2023 12:44) (101/69 - 152/82)  RR: 18 (28 Feb 2023 12:44) (17 - 24)  SpO2: 98% (27 Feb 2023 22:20) (98% - 100%)      I&O's Detail      Net: I&O's Detail      CAPILLARY BLOOD GLUCOSE      POCT Blood Glucose.: 262 mg/dL (28 Feb 2023 11:37)  POCT Blood Glucose.: 162 mg/dL (28 Feb 2023 07:30)  POCT Blood Glucose.: 145 mg/dL (28 Feb 2023 00:10)  POCT Blood Glucose.: 102 mg/dL (27 Feb 2023 16:25)    A1C with Estimated Average Glucose Result: 7.1 % (02-26-23 @ 06:55)      Physical Exam:  General: NAD; A&Ox3  Cardiac: S1/S2, RRR, no murmur, no rubs  Lungs: unlabored respirations, CTA b/l, no wheeze, no rales, no crackles  Abdomen: Soft/NT/ND; positive bowel sounds x 4  Sternum: Intact  Incisions: L chest dressing, clean and intact. dressing removed. Incisions clean/dry/intact  Extremities: No edema b/l lower extremities; good capillary refill; no cyanosis; palpable 1+ pedal pulses b/l        LABS:                        13.6<L>  12.82<H> )-----------( 275      ( 28 Feb 2023 07:52 )             40.3<L>                        13.1<L>  12.30<H> )-----------( 248      ( 27 Feb 2023 07:57 )             39.5<L>    02-28    141  |  104  |  29<H>  ----------------------------<  163<H>  4.3   |  24  |  1.4  02-27    139  |  101  |  31<H>  ----------------------------<  148<H>  3.8   |  26  |  1.6<H>    Ca    9.0      28 Feb 2023 07:52  Mg     2.1     02-28    TPro  6.1 [6.0 - 8.0]  /  Alb  3.8 [3.5 - 5.2]  /  TBili  0.6 [0.2 - 1.2]  /  DBili  x   /  AST  20 [0 - 41]  /  ALT  19 [0 - 41]  /  AlkPhos  127<H> [30 - 115]  02-28      RADIOLOGY & ADDITIONAL TESTS:  CXR: < from: Xray Chest 1 View- PORTABLE-Routine (Xray Chest 1 View- PORTABLE-Routine in AM.) (02.28.23 @ 06:49) >  PROCEDURE DATE:  02/28/2023      INTERPRETATION:  STUDY INDICATION: Shortness of Breath    TECHNIQUE:  Portable frontal view of the chest obtained.    COMPARISON: 2/27/2023    FINDINGS/  IMPRESSION:    No focal consolidation, pneumothorax or pleural effusion.    Stable cardiomediastinal silhouette.    Unchanged osseous structures.      EKG:      Allergies    No Known Allergies    Intolerances      MEDICATIONS  (STANDING):  allopurinol 100 milliGRAM(s) Oral two times a day  amLODIPine   Tablet 5 milliGRAM(s) Oral daily  aspirin  chewable 81 milliGRAM(s) Oral daily  atorvastatin 80 milliGRAM(s) Oral at bedtime  carvedilol 12.5 milliGRAM(s) Oral every 12 hours  ceFAZolin   IVPB 1000 milliGRAM(s) IV Intermittent every 8 hours  cefTRIAXone   IVPB 2000 milliGRAM(s) IV Intermittent every 24 hours  dextrose 5%. 1000 milliLiter(s) (100 mL/Hr) IV Continuous <Continuous>  dextrose 5%. 1000 milliLiter(s) (50 mL/Hr) IV Continuous <Continuous>  dextrose 50% Injectable 25 Gram(s) IV Push once  dextrose 50% Injectable 12.5 Gram(s) IV Push once  dextrose 50% Injectable 25 Gram(s) IV Push once  furosemide    Tablet 40 milliGRAM(s) Oral daily  glucagon  Injectable 1 milliGRAM(s) IntraMuscular once  insulin glargine Injectable (LANTUS) 20 Unit(s) SubCutaneous at bedtime  insulin lispro (ADMELOG) corrective regimen sliding scale   SubCutaneous three times a day before meals  insulin lispro Injectable (ADMELOG) 6 Unit(s) SubCutaneous three times a day before meals  valsartan 80 milliGRAM(s) Oral daily  vancomycin  IVPB 1250 milliGRAM(s) IV Intermittent every 12 hours    MEDICATIONS  (PRN):  acetaminophen     Tablet .. 650 milliGRAM(s) Oral every 6 hours PRN Temp greater or equal to 38C (100.4F), Mild Pain (1 - 3)  aluminum hydroxide/magnesium hydroxide/simethicone Suspension 30 milliLiter(s) Oral every 4 hours PRN Dyspepsia  dextrose Oral Gel 15 Gram(s) Oral once PRN Blood Glucose LESS THAN 70 milliGRAM(s)/deciliter  melatonin 3 milliGRAM(s) Oral at bedtime PRN Insomnia  ondansetron Injectable 4 milliGRAM(s) IV Push every 8 hours PRN Nausea and/or Vomiting    Home Medications:  allopurinol 100 mg oral tablet: 1 tab(s) orally 2 times a day (25 Feb 2023 21:12)  amLODIPine 5 mg oral tablet: 1 tab(s) orally once a day (25 Feb 2023 21:13)  aspirin 81 mg oral tablet: 1 tab(s) orally once a day (25 Feb 2023 21:15)  atorvastatin 80 mg oral tablet: 1 tab(s) orally once a day (25 Feb 2023 21:13)  Brilinta (ticagrelor) 60 mg oral tablet: 1 tab(s) orally 2 times a day (25 Feb 2023 21:14)  carvedilol 12.5 mg oral tablet: 1 tab(s) orally 2 times a day (25 Feb 2023 21:13)  ezetimibe 10 mg oral tablet: 1 tab(s) orally once a day (25 Feb 2023 21:14)  Lasix 40 mg oral tablet: 1 tab(s) orally once a day (25 Feb 2023 21:14)  Trulicity Pen 1.5 mg/0.5 mL subcutaneous solution: 1 unit(s) subcutaneous every 7 days (25 Feb 2023 21:14)  valsartan 80 mg oral capsule: 1 cap(s) orally once a day (25 Feb 2023 21:15)      Pharmacologic DVT Prophylaxis [] YES, [] NO: Contraindication:  SCD's: YES b/l    Post-Op Beta-Blockers: [X] Yes  Post-Op CCB: [X] Yes  Post-Op Aspirin:  [] Yes, [X] No: contraindication: Laser lead removal, no a/c for 72 hrs  Post-Op Statin:  [X] Yes, [] No: contraindication:    Ambulation/Activity Status: activity as tolerated    Assessment/Plan:  67y Male status-post  - Case and plan discussed with CT Surgeon - Dr. Mesa  - Continue supportive care and ongoing plan of care as per medicine  - Continue DVT/GI prophylaxis  - Incentive Spirometry 10 times an hour  - Continue to advance physical activity as tolerated and continue PT/OT as directed  - Please contact CT surgery for any additional questions

## 2023-02-28 NOTE — DISCHARGE NOTE NURSING/CASE MANAGEMENT/SOCIAL WORK - PATIENT PORTAL LINK FT
You can access the FollowMyHealth Patient Portal offered by [facility] by registering at the following website: [email or web address]. By joining [employer]’s FollowMyHealth portal, you will also be able to view your health information using other applications (apps) compatible with our system.

## 2023-02-28 NOTE — PROGRESS NOTE ADULT - ASSESSMENT
66 y/o man with PMH of CAD, s/p PCI x 2 in 2018, MI, CHF, s/p [employer] AICD that was placed in [place] in 2019, DM, HTN and hyperlipidemia Presents for redness, swelling and discomfort over AICD site. Cardiology: Dr. Rock    1. Abscess of AICD pocket   on vancomycin and rocephin per ID - Vanco trough 20.6 - continue to monitor  s/p removal of AICD on 2/27 by CT surgery  f/u wound culture and pathology  blood cultures negative x 3  pain control  continue telemetry  ECHO  evaluated by EP  ID consulted    2. DM type 2 on Trulicity at home  on lantus and lispro inpt and monitor FS  A1C 7.1    3. Chronic HFrEF  pt is euvolemic  on lasix and coreg  check ECHO    4. CAD s/p PCI x 2, MI  on ASA/statin/coreg/valsartan  on Brilinta at home - held for now    5. HTN - continue current management - BP acceptable    6. Gout  - c/w home allopurinol     7. CKD 3 - stable - monitor Vanco level    8. COVID positive  pt with mild cough, otherwise no symptoms  no further fever which may have been due to the abscess  CT scan of chest: no GGO  stable on RA  isolation per protocol    9. DVT prophylaxis     full code  guarded prognosis  high risk pt      PROGRESS NOTE HANDOFF    Pending: ECHO, ID consult, f/u wound culture, labs in AM, EP and CT surgery f/u    pt aware of the plan of care    Disposition: Home

## 2023-02-28 NOTE — PROGRESS NOTE ADULT - SUBJECTIVE AND OBJECTIVE BOX
PARUL LEAHY  67y Male    INTERVAL HPI/OVERNIGHT EVENTS:    pt feels OK - some pain at site of surgery  + cough  no fever, other pain, nausea, vomiting    T(F): 97.8 (02-28-23 @ 12:44), Max: 98.5 (02-27-23 @ 21:15)  HR: 69 (02-28-23 @ 12:44) (64 - 80)  BP: 101/69 (02-28-23 @ 12:44) (101/69 - 152/82)  RR: 18 (02-28-23 @ 12:44) (17 - 24)  SpO2: 98% (02-27-23 @ 22:20) (98% - 100%) on RA    I&O's Summary    28 Feb 2023 07:01  -  28 Feb 2023 15:43  --------------------------------------------------------  IN: 450 mL / OUT: 350 mL / NET: 100 mL        CAPILLARY BLOOD GLUCOSE      POCT Blood Glucose.: 262 mg/dL (28 Feb 2023 11:37)  POCT Blood Glucose.: 162 mg/dL (28 Feb 2023 07:30)  POCT Blood Glucose.: 145 mg/dL (28 Feb 2023 00:10)  POCT Blood Glucose.: 102 mg/dL (27 Feb 2023 16:25)        PHYSICAL EXAM:  GENERAL: NAD  HEAD:  Normocephalic  EYES:  conjunctiva and sclera clear  ENMT: Moist mucous membranes  NECK: Supple  NERVOUS SYSTEM:  Alert, awake, Good concentration  CHEST/LUNG: CTA b/l; No rales, rhonchi, wheezing  pressure dressing over left chest wall  HEART: Regular rate and rhythm  ABDOMEN: Soft, Nontender, Nondistended; Bowel sounds present  EXTREMITIES: No edema  SKIN: warm, dry    Consultant(s) Notes Reviewed:  [x ] YES  [ ] NO  Care Discussed with Consultants/Other Providers [ x] YES  [ ] NO    MEDICATIONS  (STANDING):  allopurinol 100 milliGRAM(s) Oral two times a day  amLODIPine   Tablet 5 milliGRAM(s) Oral daily  aspirin  chewable 81 milliGRAM(s) Oral daily  atorvastatin 80 milliGRAM(s) Oral at bedtime  carvedilol 12.5 milliGRAM(s) Oral every 12 hours  cefTRIAXone   IVPB 2000 milliGRAM(s) IV Intermittent every 24 hours  dextrose 5%. 1000 milliLiter(s) (100 mL/Hr) IV Continuous <Continuous>  dextrose 5%. 1000 milliLiter(s) (50 mL/Hr) IV Continuous <Continuous>  dextrose 50% Injectable 25 Gram(s) IV Push once  dextrose 50% Injectable 12.5 Gram(s) IV Push once  dextrose 50% Injectable 25 Gram(s) IV Push once  furosemide    Tablet 40 milliGRAM(s) Oral daily  glucagon  Injectable 1 milliGRAM(s) IntraMuscular once  insulin glargine Injectable (LANTUS) 20 Unit(s) SubCutaneous at bedtime  insulin lispro (ADMELOG) corrective regimen sliding scale   SubCutaneous three times a day before meals  insulin lispro Injectable (ADMELOG) 6 Unit(s) SubCutaneous three times a day before meals  valsartan 80 milliGRAM(s) Oral daily  vancomycin  IVPB 1250 milliGRAM(s) IV Intermittent every 12 hours    MEDICATIONS  (PRN):  acetaminophen     Tablet .. 650 milliGRAM(s) Oral every 6 hours PRN Temp greater or equal to 38C (100.4F), Mild Pain (1 - 3)  aluminum hydroxide/magnesium hydroxide/simethicone Suspension 30 milliLiter(s) Oral every 4 hours PRN Dyspepsia  dextrose Oral Gel 15 Gram(s) Oral once PRN Blood Glucose LESS THAN 70 milliGRAM(s)/deciliter  melatonin 3 milliGRAM(s) Oral at bedtime PRN Insomnia  ondansetron Injectable 4 milliGRAM(s) IV Push every 8 hours PRN Nausea and/or Vomiting      Telemetry reviewed by me    LABS:                        13.6   12.82 )-----------( 275      ( 28 Feb 2023 07:52 )             40.3     02-28    141  |  104  |  29<H>  ----------------------------<  163<H>  4.3   |  24  |  1.4    Ca    9.0      28 Feb 2023 07:52  Mg     2.1     02-28    TPro  6.1  /  Alb  3.8  /  TBili  0.6  /  DBili  x   /  AST  20  /  ALT  19  /  AlkPhos  127<H>  02-28            Culture - Blood (collected 26 Feb 2023 06:55)  Source: .Blood Blood  Preliminary Report (27 Feb 2023 13:02):    No growth to date.    Culture - Blood (collected 25 Feb 2023 16:44)  Source: .Blood Blood  Preliminary Report (27 Feb 2023 05:01):    No growth to date.    Culture - Blood (collected 25 Feb 2023 16:44)  Source: .Blood Blood  Preliminary Report (27 Feb 2023 05:01):    No growth to date.        RADIOLOGY & ADDITIONAL TESTS:    Imaging and report Personally Reviewed:  [ x] YES  [ ] NO    < from: Xray Chest 1 View- PORTABLE-Routine (Xray Chest 1 View- PORTABLE-Routine in AM.) (02.28.23 @ 06:49) >  FINDINGS/  IMPRESSION:    No focal consolidation, pneumothorax or pleural effusion.    Stable cardiomediastinal silhouette.    Unchanged osseous structures.    < end of copied text >      < from: CT Chest w/ IV Cont (02.25.23 @ 18:11) >    IMPRESSION:    Collection adjacent to ICD within the left chest subcutaneous tissues   measuring 2.6 x 2.3 x 1.7 cm.    < end of copied text >      Case discussed with residents and RN on rounds today    Care discussed with pt

## 2023-03-01 LAB
ALBUMIN SERPL ELPH-MCNC: 3.5 G/DL — SIGNIFICANT CHANGE UP (ref 3.5–5.2)
ALP SERPL-CCNC: 122 U/L — HIGH (ref 30–115)
ALT FLD-CCNC: 13 U/L — SIGNIFICANT CHANGE UP (ref 0–41)
ANION GAP SERPL CALC-SCNC: 11 MMOL/L — SIGNIFICANT CHANGE UP (ref 7–14)
AST SERPL-CCNC: 21 U/L — SIGNIFICANT CHANGE UP (ref 0–41)
BASOPHILS # BLD AUTO: 0.11 K/UL — SIGNIFICANT CHANGE UP (ref 0–0.2)
BASOPHILS NFR BLD AUTO: 0.7 % — SIGNIFICANT CHANGE UP (ref 0–1)
BILIRUB SERPL-MCNC: 0.4 MG/DL — SIGNIFICANT CHANGE UP (ref 0.2–1.2)
BUN SERPL-MCNC: 36 MG/DL — HIGH (ref 10–20)
CALCIUM SERPL-MCNC: 9 MG/DL — SIGNIFICANT CHANGE UP (ref 8.4–10.4)
CHLORIDE SERPL-SCNC: 105 MMOL/L — SIGNIFICANT CHANGE UP (ref 98–110)
CO2 SERPL-SCNC: 26 MMOL/L — SIGNIFICANT CHANGE UP (ref 17–32)
CREAT SERPL-MCNC: 1.6 MG/DL — HIGH (ref 0.7–1.5)
EGFR: 47 ML/MIN/1.73M2 — LOW
EOSINOPHIL # BLD AUTO: 0.27 K/UL — SIGNIFICANT CHANGE UP (ref 0–0.7)
EOSINOPHIL NFR BLD AUTO: 1.8 % — SIGNIFICANT CHANGE UP (ref 0–8)
GLUCOSE BLDC GLUCOMTR-MCNC: 114 MG/DL — HIGH (ref 70–99)
GLUCOSE BLDC GLUCOMTR-MCNC: 146 MG/DL — HIGH (ref 70–99)
GLUCOSE BLDC GLUCOMTR-MCNC: 160 MG/DL — HIGH (ref 70–99)
GLUCOSE BLDC GLUCOMTR-MCNC: 92 MG/DL — SIGNIFICANT CHANGE UP (ref 70–99)
GLUCOSE SERPL-MCNC: 112 MG/DL — HIGH (ref 70–99)
HCT VFR BLD CALC: 37.5 % — LOW (ref 42–52)
HGB BLD-MCNC: 12.6 G/DL — LOW (ref 14–18)
IMM GRANULOCYTES NFR BLD AUTO: 0.5 % — HIGH (ref 0.1–0.3)
LYMPHOCYTES # BLD AUTO: 15.9 % — LOW (ref 20.5–51.1)
LYMPHOCYTES # BLD AUTO: 2.34 K/UL — SIGNIFICANT CHANGE UP (ref 1.2–3.4)
MAGNESIUM SERPL-MCNC: 2.2 MG/DL — SIGNIFICANT CHANGE UP (ref 1.8–2.4)
MCHC RBC-ENTMCNC: 30.6 PG — SIGNIFICANT CHANGE UP (ref 27–31)
MCHC RBC-ENTMCNC: 33.6 G/DL — SIGNIFICANT CHANGE UP (ref 32–37)
MCV RBC AUTO: 91 FL — SIGNIFICANT CHANGE UP (ref 80–94)
MONOCYTES # BLD AUTO: 1.19 K/UL — HIGH (ref 0.1–0.6)
MONOCYTES NFR BLD AUTO: 8.1 % — SIGNIFICANT CHANGE UP (ref 1.7–9.3)
NEUTROPHILS # BLD AUTO: 10.73 K/UL — HIGH (ref 1.4–6.5)
NEUTROPHILS NFR BLD AUTO: 73 % — SIGNIFICANT CHANGE UP (ref 42.2–75.2)
NRBC # BLD: 0 /100 WBCS — SIGNIFICANT CHANGE UP (ref 0–0)
PLATELET # BLD AUTO: 265 K/UL — SIGNIFICANT CHANGE UP (ref 130–400)
POTASSIUM SERPL-MCNC: 4.1 MMOL/L — SIGNIFICANT CHANGE UP (ref 3.5–5)
POTASSIUM SERPL-SCNC: 4.1 MMOL/L — SIGNIFICANT CHANGE UP (ref 3.5–5)
PROT SERPL-MCNC: 5.9 G/DL — LOW (ref 6–8)
RBC # BLD: 4.12 M/UL — LOW (ref 4.7–6.1)
RBC # FLD: 14.6 % — HIGH (ref 11.5–14.5)
SODIUM SERPL-SCNC: 142 MMOL/L — SIGNIFICANT CHANGE UP (ref 135–146)
WBC # BLD: 14.71 K/UL — HIGH (ref 4.8–10.8)
WBC # FLD AUTO: 14.71 K/UL — HIGH (ref 4.8–10.8)

## 2023-03-01 PROCEDURE — 99232 SBSQ HOSP IP/OBS MODERATE 35: CPT

## 2023-03-01 RX ORDER — CHLORHEXIDINE GLUCONATE 213 G/1000ML
1 SOLUTION TOPICAL DAILY
Refills: 0 | Status: DISCONTINUED | OUTPATIENT
Start: 2023-03-01 | End: 2023-03-08

## 2023-03-01 RX ADMIN — CEFTRIAXONE 100 MILLIGRAM(S): 500 INJECTION, POWDER, FOR SOLUTION INTRAMUSCULAR; INTRAVENOUS at 13:42

## 2023-03-01 RX ADMIN — Medication 100 MILLIGRAM(S): at 22:20

## 2023-03-01 RX ADMIN — Medication 6 UNIT(S): at 07:57

## 2023-03-01 RX ADMIN — VALSARTAN 80 MILLIGRAM(S): 80 TABLET ORAL at 06:31

## 2023-03-01 RX ADMIN — ATORVASTATIN CALCIUM 80 MILLIGRAM(S): 80 TABLET, FILM COATED ORAL at 22:20

## 2023-03-01 RX ADMIN — Medication 100 MILLIGRAM(S): at 12:08

## 2023-03-01 RX ADMIN — Medication 6 UNIT(S): at 17:50

## 2023-03-01 RX ADMIN — Medication 1: at 07:57

## 2023-03-01 RX ADMIN — Medication 166.67 MILLIGRAM(S): at 17:29

## 2023-03-01 RX ADMIN — Medication 166.67 MILLIGRAM(S): at 06:33

## 2023-03-01 RX ADMIN — HEPARIN SODIUM 5000 UNIT(S): 5000 INJECTION INTRAVENOUS; SUBCUTANEOUS at 22:20

## 2023-03-01 RX ADMIN — CARVEDILOL PHOSPHATE 12.5 MILLIGRAM(S): 80 CAPSULE, EXTENDED RELEASE ORAL at 06:31

## 2023-03-01 RX ADMIN — INSULIN GLARGINE 20 UNIT(S): 100 INJECTION, SOLUTION SUBCUTANEOUS at 22:20

## 2023-03-01 RX ADMIN — Medication 6 UNIT(S): at 12:10

## 2023-03-01 RX ADMIN — Medication 81 MILLIGRAM(S): at 12:08

## 2023-03-01 RX ADMIN — Medication 40 MILLIGRAM(S): at 07:02

## 2023-03-01 RX ADMIN — HEPARIN SODIUM 5000 UNIT(S): 5000 INJECTION INTRAVENOUS; SUBCUTANEOUS at 13:42

## 2023-03-01 RX ADMIN — HEPARIN SODIUM 5000 UNIT(S): 5000 INJECTION INTRAVENOUS; SUBCUTANEOUS at 06:33

## 2023-03-01 RX ADMIN — AMLODIPINE BESYLATE 5 MILLIGRAM(S): 2.5 TABLET ORAL at 06:31

## 2023-03-01 RX ADMIN — CHLORHEXIDINE GLUCONATE 1 APPLICATION(S): 213 SOLUTION TOPICAL at 12:07

## 2023-03-01 NOTE — PROGRESS NOTE ADULT - ASSESSMENT
66 y/o man with PMH of CAD, s/p PCI x 2 in 2018, MI, CHF, s/p [employer] AICD that was placed in [place] in 2019, DM, HTN and hyperlipidemia Presents for redness, swelling and discomfort over AICD site. Cardiology: Dr. Rock    1. Abscess of AICD pocket   on vancomycin and rocephin per ID - Vanco trough 20.6 - continue to monitor  as per ID continue vanco 1250 and ceftriaxone 2 gm   s/p removal of AICD on 2/27 by CT surgery  f/u wound culture and pathology  blood cultures negative x 3  pain control  continue telemetry  ECHO  evaluated by EP  ID consulted, follow recommendations  follow OR culture report    2. DM type 2 on Trulicity at home  on lantus and lispro inpt and monitor FS  A1C 7.1    3. Chronic HFrEF  pt is euvolemic  on lasix and coreg  check ECHO, pending    4. CAD s/p PCI x 2, MI  on ASA/statin/coreg/valsartan  on Brilinta at home - held for now   Patient may require monitoring device prior to discharge. Please f/u with EP for discharge liyah.       5. HTN - continue current management - BP acceptable    6. Gout  - c/w home allopurinol     7. CKD 3 - stable - monitor Vanco level    8. COVID positive  pt with mild cough, otherwise no symptoms  no further fever which may have been due to the abscess  CT scan of chest: no GGO  stable on RA  isolation per protocol  ID on board     9. DVT prophylaxis   on heparin     full code  guarded prognosis  high risk pt      PROGRESS NOTE HANDOFF    Pending: ECHO, ID consult, f/u wound culture, labs in AM, EP and CT surgery f/u    pt aware of the plan of care    Disposition: Home   discussed in length with patient       as per CT surgeyr   1. EP: Patient may require monitoring device prior to discharge. Please f/u with EP for discharge liyah.   2. CTS: Post-op  Laser lead explant of AICD: No Heavy lifting >10 lbs. Keep occlusive dressing intact, ok to shower with occlusive dressing, No submerging in water for 1 month. Occlusive dressing will be removed in cardiac surgery clinic by Dr. Mesa.   - I+D of abscess; incision clean and packing removed. Retention sutures to be removed in office by Dr. Mesa. Please cleanse wound with chlorhexidine, overlay with gauze and secure with non-occlusive tape daily.    - Please follow up with Dr. Mesa at [address] [place] [address], [address]; [phone] 1 week post discharge.

## 2023-03-01 NOTE — CONSULT NOTE ADULT - ASSESSMENT
pocket abscess  -Will reinterrogate device  - echo toe valute for EF  - BCx are pending  - patient will require extraction of the device.  - will follow
67-year-old male with CAD, s/p PCI x 2 2018, CHF, diabetes, hypertension, high cholesterol, CHF,[employer] aicd that was placed in [place] in 2019 post MI  CT chest with IV contrast : Collection adjacent to ICD within the left chest subcutaneous tissues measuring 2.6 x 2.3 x 1.7 cm.    IMPRESSION;  Left AICD pocket space abscess   2/27 S/p explant  No sepsis  2/25,26 BCx NGTD    RECOMMENDATIONS;  F/u OR cultures  Vancomycin 1250 mg iv q12h  Rocephin 2 gm iv q24h

## 2023-03-01 NOTE — PROGRESS NOTE ADULT - SUBJECTIVE AND OBJECTIVE BOX
OPERATIVE PROCEDURE(s):   Laser lead explant of AICD             POD #   2                    SURGEON(s): JULIANO Mesa  Atteding: CARLOS Enrique    HPI: 67y Male with hx of [employer] aicd that was placed in [place] in 2019 reports that he has had an inc in swelling around pocket of device for the past year however for the past week there has been an inc in redness  around the incision.   He also reports chills within the last 24 hours and tenderness at the site.  Pt states he has had 2 coronary stents placed in 2018 and has been on Brilinta ever since.  He denies ever having a problem with the device in the past and gets it checked remotely with his electrophysiologist Dr. Loja and cardiologist Dr Rock.    Patient is COVID + and has induration of the pocket that is fluctuant, red and painful. Consistent with abscess. CTS consulted for AICD explant.     SUBJECTIVE ASSESSMENT:67yMale patient seen and examined at bedside.    Vital Signs Last 24 Hrs  T(F): 96.8 (01 Mar 2023 05:22), Max: 98.1 (28 Feb 2023 19:45)  HR: 63 (01 Mar 2023 05:22) (63 - 72)  BP: 113/67 (01 Mar 2023 05:22) (101/69 - 113/67)  RR: 18 (01 Mar 2023 05:22) (18 - 18)  SpO2: 95% (28 Feb 2023 22:54) (95% - 95%)    I&O's Detail    28 Feb 2023 07:01  -  01 Mar 2023 07:00  --------------------------------------------------------  IN:    Oral Fluid: 450 mL  Total IN: 450 mL    OUT:    Voided (mL): 350 mL  Total OUT: 350 mL    Net: I&O's Detail    28 Feb 2023 07:01  -  01 Mar 2023 07:00  --------------------------------------------------------  Total NET: 100 mL    CAPILLARY BLOOD GLUCOSE  POCT Blood Glucose.: 160 mg/dL (01 Mar 2023 07:36)  POCT Blood Glucose.: 96 mg/dL (28 Feb 2023 21:37)  POCT Blood Glucose.: 228 mg/dL (28 Feb 2023 16:28)  POCT Blood Glucose.: 262 mg/dL (28 Feb 2023 11:37)    A1C with Estimated Average Glucose Result: 7.1 % (02-26-23 @ 06:55)      Physical Exam:  General: NAD; A&Ox3  Cardiac: S1/S2, RRR, no murmur, no rubs  Lungs: unlabored respirations, CTA b/l, no wheeze, no rales, no crackles  Abdomen: Soft/NT/ND; positive bowel sounds x 4  Incisions: L chest dressing, clean and intact. Incisions clean/dry/intact  Extremities: No edema b/l lower extremities; good capillary refill; no cyanosis; palpable 1+ pedal pulses       LABS:                        12.6<L>  14.71<H> )-----------( 265      ( 01 Mar 2023 06:28 )             37.5<L>                        13.6<L>  12.82<H> )-----------( 275      ( 28 Feb 2023 07:52 )             40.3<L>    03-01    142  |  105  |  36<H>  ----------------------------<  112<H>  4.1   |  26  |  1.6<H>  02-28    141  |  104  |  29<H>  ----------------------------<  163<H>  4.3   |  24  |  1.4    Ca    9.0      01 Mar 2023 06:28  Mg     2.2     03-01    TPro  5.9<L> [6.0 - 8.0]  /  Alb  3.5 [3.5 - 5.2]  /  TBili  0.4 [0.2 - 1.2]  /  DBili  x   /  AST  21 [0 - 41]  /  ALT  13 [0 - 41]  /  AlkPhos  122<H> [30 - 115]  03-01    Allergies    No Known Allergies    Intolerances      MEDICATIONS  (STANDING):  allopurinol 100 milliGRAM(s) Oral two times a day  amLODIPine   Tablet 5 milliGRAM(s) Oral daily  aspirin  chewable 81 milliGRAM(s) Oral daily  atorvastatin 80 milliGRAM(s) Oral at bedtime  carvedilol 12.5 milliGRAM(s) Oral every 12 hours  cefTRIAXone   IVPB 2000 milliGRAM(s) IV Intermittent every 24 hours  chlorhexidine 2% Cloths 1 Application(s) Topical daily  dextrose 5%. 1000 milliLiter(s) (100 mL/Hr) IV Continuous <Continuous>  dextrose 5%. 1000 milliLiter(s) (50 mL/Hr) IV Continuous <Continuous>  dextrose 50% Injectable 25 Gram(s) IV Push once  dextrose 50% Injectable 12.5 Gram(s) IV Push once  dextrose 50% Injectable 25 Gram(s) IV Push once  furosemide    Tablet 40 milliGRAM(s) Oral daily  glucagon  Injectable 1 milliGRAM(s) IntraMuscular once  heparin   Injectable 5000 Unit(s) SubCutaneous every 8 hours  insulin glargine Injectable (LANTUS) 20 Unit(s) SubCutaneous at bedtime  insulin lispro (ADMELOG) corrective regimen sliding scale   SubCutaneous three times a day before meals  insulin lispro Injectable (ADMELOG) 6 Unit(s) SubCutaneous three times a day before meals  valsartan 80 milliGRAM(s) Oral daily  vancomycin  IVPB 1250 milliGRAM(s) IV Intermittent every 12 hours    MEDICATIONS  (PRN):  acetaminophen     Tablet .. 650 milliGRAM(s) Oral every 6 hours PRN Temp greater or equal to 38C (100.4F), Mild Pain (1 - 3)  aluminum hydroxide/magnesium hydroxide/simethicone Suspension 30 milliLiter(s) Oral every 4 hours PRN Dyspepsia  dextrose Oral Gel 15 Gram(s) Oral once PRN Blood Glucose LESS THAN 70 milliGRAM(s)/deciliter  melatonin 3 milliGRAM(s) Oral at bedtime PRN Insomnia  ondansetron Injectable 4 milliGRAM(s) IV Push every 8 hours PRN Nausea and/or Vomiting    Home Medications:  allopurinol 100 mg oral tablet: 1 tab(s) orally 2 times a day (25 Feb 2023 21:12)  amLODIPine 5 mg oral tablet: 1 tab(s) orally once a day (25 Feb 2023 21:13)  aspirin 81 mg oral tablet: 1 tab(s) orally once a day (25 Feb 2023 21:15)  atorvastatin 80 mg oral tablet: 1 tab(s) orally once a day (25 Feb 2023 21:13)  Brilinta (ticagrelor) 60 mg oral tablet: 1 tab(s) orally 2 times a day (25 Feb 2023 21:14)  carvedilol 12.5 mg oral tablet: 1 tab(s) orally 2 times a day (25 Feb 2023 21:13)  ezetimibe 10 mg oral tablet: 1 tab(s) orally once a day (25 Feb 2023 21:14)  Lasix 40 mg oral tablet: 1 tab(s) orally once a day (25 Feb 2023 21:14)  Trulicity Pen 1.5 mg/0.5 mL subcutaneous solution: 1 unit(s) subcutaneous every 7 days (25 Feb 2023 21:14)  valsartan 80 mg oral capsule: 1 cap(s) orally once a day (25 Feb 2023 21:15)    Assessment/Plan:  67y Male status-post  Laser lead explant of AICD             POD #   2  - Case and plan discussed with CTU Intensivist and CT Surgeon - Dr. Mesa  - Continue CTU supportive care and ongoing plan of care as per continuing CTU rounds.   - Continue DVT/GI prophylaxis  - Incentive Spirometry 10 times an hour  - Continue to advance physical activity as tolerated and continue PT/OT as directed  1. EP: Patient may require monitoring device prior to discharge. Please f/u with EP for discharge liyah.   2. CTS: Post-op  Laser lead explant of AICD: No Heavy lifting >10 lbs. Keep occlusive dressing intact, ok to shower with occlusive dressing, No submerging in water for 1 month. Occlusive dressing will be removed in cardiac surgery clinic by Dr. Mesa.   - I+D of abscess; incision clean and packing removed. Retention sutures to be removed in office by Dr. Mesa. Please cleanse wound with chlorhexidine, overlay with gauze and secure with non-occlusive tape daily.    - Please follow up with Dr. Mesa at [address] [place] [address], [address]; [phone] 1 week post discharge.

## 2023-03-01 NOTE — PROGRESS NOTE ADULT - SUBJECTIVE AND OBJECTIVE BOX
PARUL LEAHY  67y Male    INTERVAL HPI/OVERNIGHT EVENTS:    pt feels OK - some pain at site of surgery  no acute issues overnight  no major complaints     Vital Signs Last 24 Hrs  T(C): 36.2 (01 Mar 2023 12:30), Max: 36.7 (28 Feb 2023 19:45)  T(F): 97.2 (01 Mar 2023 12:30), Max: 98.1 (28 Feb 2023 19:45)  HR: 71 (01 Mar 2023 12:30) (63 - 72)  BP: 106/68 (01 Mar 2023 12:30) (106/68 - 113/67)  BP(mean): --  RR: 18 (01 Mar 2023 12:30) (18 - 18)  SpO2: 95% (01 Mar 2023 11:12) (95% - 95%)    Parameters below as of 01 Mar 2023 11:12  Patient On (Oxygen Delivery Method): room air      I&O's Summary    28 Feb 2023 07:01  -  28 Feb 2023 15:43  --------------------------------------------------------  IN: 450 mL / OUT: 350 mL / NET: 100 mL        CAPILLARY BLOOD GLUCOSE      POCT Blood Glucose.: 262 mg/dL (28 Feb 2023 11:37)  POCT Blood Glucose.: 162 mg/dL (28 Feb 2023 07:30)  POCT Blood Glucose.: 145 mg/dL (28 Feb 2023 00:10)  POCT Blood Glucose.: 102 mg/dL (27 Feb 2023 16:25)        PHYSICAL EXAM:  GENERAL: NAD  HEAD:  Normocephalic  EYES:  conjunctiva and sclera clear  ENMT: Moist mucous membranes  NECK: Supple  NERVOUS SYSTEM:  Alert, awake, Good concentration  CHEST/LUNG: CTA b/l; No rales, rhonchi, wheezing  pressure dressing over left chest wall  HEART: Regular rate and rhythm  ABDOMEN: Soft, Nontender, Nondistended; Bowel sounds present  EXTREMITIES: No edema  SKIN: warm, dry    Consultant(s) Notes Reviewed:  [x ] YES  [ ] NO  Care Discussed with Consultants/Other Providers [ x] YES  [ ] NO    MEDICATIONS  (STANDING):  allopurinol 100 milliGRAM(s) Oral two times a day  amLODIPine   Tablet 5 milliGRAM(s) Oral daily  aspirin  chewable 81 milliGRAM(s) Oral daily  atorvastatin 80 milliGRAM(s) Oral at bedtime  carvedilol 12.5 milliGRAM(s) Oral every 12 hours  cefTRIAXone   IVPB 2000 milliGRAM(s) IV Intermittent every 24 hours  dextrose 5%. 1000 milliLiter(s) (100 mL/Hr) IV Continuous <Continuous>  dextrose 5%. 1000 milliLiter(s) (50 mL/Hr) IV Continuous <Continuous>  dextrose 50% Injectable 25 Gram(s) IV Push once  dextrose 50% Injectable 12.5 Gram(s) IV Push once  dextrose 50% Injectable 25 Gram(s) IV Push once  furosemide    Tablet 40 milliGRAM(s) Oral daily  glucagon  Injectable 1 milliGRAM(s) IntraMuscular once  insulin glargine Injectable (LANTUS) 20 Unit(s) SubCutaneous at bedtime  insulin lispro (ADMELOG) corrective regimen sliding scale   SubCutaneous three times a day before meals  insulin lispro Injectable (ADMELOG) 6 Unit(s) SubCutaneous three times a day before meals  valsartan 80 milliGRAM(s) Oral daily  vancomycin  IVPB 1250 milliGRAM(s) IV Intermittent every 12 hours    MEDICATIONS  (PRN):  acetaminophen     Tablet .. 650 milliGRAM(s) Oral every 6 hours PRN Temp greater or equal to 38C (100.4F), Mild Pain (1 - 3)  aluminum hydroxide/magnesium hydroxide/simethicone Suspension 30 milliLiter(s) Oral every 4 hours PRN Dyspepsia  dextrose Oral Gel 15 Gram(s) Oral once PRN Blood Glucose LESS THAN 70 milliGRAM(s)/deciliter  melatonin 3 milliGRAM(s) Oral at bedtime PRN Insomnia  ondansetron Injectable 4 milliGRAM(s) IV Push every 8 hours PRN Nausea and/or Vomiting      Telemetry reviewed by me    LABS:               LABS:                        12.6   14.71 )-----------( 265      ( 01 Mar 2023 06:28 )             37.5     03-01    142  |  105  |  36<H>  ----------------------------<  112<H>  4.1   |  26  |  1.6<H>    Ca    9.0      01 Mar 2023 06:28  Mg     2.2     03-01    TPro  5.9<L>  /  Alb  3.5  /  TBili  0.4  /  DBili  x   /  AST  21  /  ALT  13  /  AlkPhos  122<H>  03-01                    Culture - Blood (collected 26 Feb 2023 06:55)  Source: .Blood Blood  Preliminary Report (27 Feb 2023 13:02):    No growth to date.    Culture - Blood (collected 25 Feb 2023 16:44)  Source: .Blood Blood  Preliminary Report (27 Feb 2023 05:01):    No growth to date.    Culture - Blood (collected 25 Feb 2023 16:44)  Source: .Blood Blood  Preliminary Report (27 Feb 2023 05:01):    No growth to date.        RADIOLOGY & ADDITIONAL TESTS:    Imaging and report Personally Reviewed:  [ x] YES  [ ] NO    < from: Xray Chest 1 View- PORTABLE-Routine (Xray Chest 1 View- PORTABLE-Routine in AM.) (02.28.23 @ 06:49) >  FINDINGS/  IMPRESSION:    No focal consolidation, pneumothorax or pleural effusion.    Stable cardiomediastinal silhouette.    Unchanged osseous structures.    < end of copied text >      < from: CT Chest w/ IV Cont (02.25.23 @ 18:11) >    IMPRESSION:    Collection adjacent to ICD within the left chest subcutaneous tissues   measuring 2.6 x 2.3 x 1.7 cm.    < end of copied text >      Case discussed with residents and RN on rounds today    Care discussed with pt

## 2023-03-01 NOTE — CONSULT NOTE ADULT - SUBJECTIVE AND OBJECTIVE BOX
PARUL LEAHY  67y, Male  Allergy: No Known Allergies      All historical available data reviewed.    HPI:  67-year-old male    Hx hx of CAD, s/p PCI x 2 2018, CHF, diabetes, hypertension, high cholesterol, CHF,[employer] aicd that was placed in [place] in 2019 post MI    Presents to the ED complaining of left chest wall abscess over defibrillator site.    Reports that he has had an inc in swelling around pocket of device for the past year however for the past week there has been an inc in redness  around the incision. He also reports chills within the last 24 hours and tenderness at the site. He denies ever having a problem with the device in the past and gets it checked remotely with his cardiologist Dr Rock in [place].    In the ED :  BP: Not recorded   HR : 80  RR : 18  T : 97  O2: 98% on RA     WBC : 10.45  Cr: 1.3 (unknown baseline)  CT chest with IV contrast : Collection adjacent to ICD within the left chest subcutaneous tissues measuring 2.6 x 2.3 x 1.7 cm.  ECG : Sinus with first degree block     s/p Vanco 1g IV x1    CT Surgery saw patient : pt may need to have device explanted and pocket washed out -- pending final plan by attending   EP consult placed for device interrogation    (25 Feb 2023 20:00)  2/27 S/p explant  D called to adjust ABx    FAMILY HISTORY:    PAST MEDICAL & SURGICAL HISTORY:  Diabetes mellitus, type 2      History of heart attack      Hypertension            VITALS:  T(F): 96.8, Max: 98.1 (02-28-23 @ 19:45)  HR: 63  BP: 113/67  RR: 18Vital Signs Last 24 Hrs  T(C): 36 (01 Mar 2023 05:22), Max: 36.7 (28 Feb 2023 19:45)  T(F): 96.8 (01 Mar 2023 05:22), Max: 98.1 (28 Feb 2023 19:45)  HR: 63 (01 Mar 2023 05:22) (63 - 72)  BP: 113/67 (01 Mar 2023 05:22) (101/69 - 113/67)  BP(mean): --  RR: 18 (01 Mar 2023 05:22) (18 - 18)  SpO2: 95% (01 Mar 2023 11:12) (95% - 95%)    Parameters below as of 01 Mar 2023 11:12  Patient On (Oxygen Delivery Method): room air        TESTS & MEASUREMENTS:                        12.6   14.71 )-----------( 265      ( 01 Mar 2023 06:28 )             37.5     03-01    142  |  105  |  36<H>  ----------------------------<  112<H>  4.1   |  26  |  1.6<H>    Ca    9.0      01 Mar 2023 06:28  Mg     2.2     03-01    TPro  5.9<L>  /  Alb  3.5  /  TBili  0.4  /  DBili  x   /  AST  21  /  ALT  13  /  AlkPhos  122<H>  03-01    LIVER FUNCTIONS - ( 01 Mar 2023 06:28 )  Alb: 3.5 g/dL / Pro: 5.9 g/dL / ALK PHOS: 122 U/L / ALT: 13 U/L / AST: 21 U/L / GGT: x             Culture - Blood (collected 02-26-23 @ 06:55)  Source: .Blood Blood  Preliminary Report (02-27-23 @ 13:02):    No growth to date.    Culture - Blood (collected 02-25-23 @ 16:44)  Source: .Blood Blood  Preliminary Report (02-27-23 @ 05:01):    No growth to date.    Culture - Blood (collected 02-25-23 @ 16:44)  Source: .Blood Blood  Preliminary Report (02-27-23 @ 05:01):    No growth to date.            RADIOLOGY & ADDITIONAL TESTS:  Personal review of radiological diagnostics performed  Echo and EKG results noted when applicable.     MEDICATIONS:  acetaminophen     Tablet .. 650 milliGRAM(s) Oral every 6 hours PRN  allopurinol 100 milliGRAM(s) Oral two times a day  aluminum hydroxide/magnesium hydroxide/simethicone Suspension 30 milliLiter(s) Oral every 4 hours PRN  amLODIPine   Tablet 5 milliGRAM(s) Oral daily  aspirin  chewable 81 milliGRAM(s) Oral daily  atorvastatin 80 milliGRAM(s) Oral at bedtime  carvedilol 12.5 milliGRAM(s) Oral every 12 hours  cefTRIAXone   IVPB 2000 milliGRAM(s) IV Intermittent every 24 hours  chlorhexidine 2% Cloths 1 Application(s) Topical daily  dextrose 5%. 1000 milliLiter(s) IV Continuous <Continuous>  dextrose 5%. 1000 milliLiter(s) IV Continuous <Continuous>  dextrose 50% Injectable 25 Gram(s) IV Push once  dextrose 50% Injectable 12.5 Gram(s) IV Push once  dextrose 50% Injectable 25 Gram(s) IV Push once  dextrose Oral Gel 15 Gram(s) Oral once PRN  furosemide    Tablet 40 milliGRAM(s) Oral daily  glucagon  Injectable 1 milliGRAM(s) IntraMuscular once  heparin   Injectable 5000 Unit(s) SubCutaneous every 8 hours  insulin glargine Injectable (LANTUS) 20 Unit(s) SubCutaneous at bedtime  insulin lispro (ADMELOG) corrective regimen sliding scale   SubCutaneous three times a day before meals  insulin lispro Injectable (ADMELOG) 6 Unit(s) SubCutaneous three times a day before meals  melatonin 3 milliGRAM(s) Oral at bedtime PRN  ondansetron Injectable 4 milliGRAM(s) IV Push every 8 hours PRN  valsartan 80 milliGRAM(s) Oral daily  vancomycin  IVPB 1250 milliGRAM(s) IV Intermittent every 12 hours      ANTIBIOTICS:  cefTRIAXone   IVPB 2000 milliGRAM(s) IV Intermittent every 24 hours  vancomycin  IVPB 1250 milliGRAM(s) IV Intermittent every 12 hours

## 2023-03-02 LAB
-  AMPICILLIN/SULBACTAM: SIGNIFICANT CHANGE UP
-  CEFAZOLIN: SIGNIFICANT CHANGE UP
-  CLINDAMYCIN: SIGNIFICANT CHANGE UP
-  ERYTHROMYCIN: SIGNIFICANT CHANGE UP
-  GENTAMICIN: SIGNIFICANT CHANGE UP
-  OXACILLIN: SIGNIFICANT CHANGE UP
-  RIFAMPIN: SIGNIFICANT CHANGE UP
-  TETRACYCLINE: SIGNIFICANT CHANGE UP
-  TRIMETHOPRIM/SULFAMETHOXAZOLE: SIGNIFICANT CHANGE UP
-  VANCOMYCIN: SIGNIFICANT CHANGE UP
ALBUMIN SERPL ELPH-MCNC: 3.6 G/DL — SIGNIFICANT CHANGE UP (ref 3.5–5.2)
ALP SERPL-CCNC: 123 U/L — HIGH (ref 30–115)
ALT FLD-CCNC: 39 U/L — SIGNIFICANT CHANGE UP (ref 0–41)
ANION GAP SERPL CALC-SCNC: 12 MMOL/L — SIGNIFICANT CHANGE UP (ref 7–14)
AST SERPL-CCNC: 44 U/L — HIGH (ref 0–41)
BASOPHILS # BLD AUTO: 0.12 K/UL — SIGNIFICANT CHANGE UP (ref 0–0.2)
BASOPHILS NFR BLD AUTO: 1.1 % — HIGH (ref 0–1)
BILIRUB SERPL-MCNC: 0.3 MG/DL — SIGNIFICANT CHANGE UP (ref 0.2–1.2)
BUN SERPL-MCNC: 35 MG/DL — HIGH (ref 10–20)
CALCIUM SERPL-MCNC: 8.5 MG/DL — SIGNIFICANT CHANGE UP (ref 8.4–10.5)
CHLORIDE SERPL-SCNC: 107 MMOL/L — SIGNIFICANT CHANGE UP (ref 98–110)
CO2 SERPL-SCNC: 20 MMOL/L — SIGNIFICANT CHANGE UP (ref 17–32)
CREAT SERPL-MCNC: 1.4 MG/DL — SIGNIFICANT CHANGE UP (ref 0.7–1.5)
CULTURE RESULTS: SIGNIFICANT CHANGE UP
CULTURE RESULTS: SIGNIFICANT CHANGE UP
EGFR: 55 ML/MIN/1.73M2 — LOW
EOSINOPHIL # BLD AUTO: 0.67 K/UL — SIGNIFICANT CHANGE UP (ref 0–0.7)
EOSINOPHIL NFR BLD AUTO: 6.4 % — SIGNIFICANT CHANGE UP (ref 0–8)
GLUCOSE BLDC GLUCOMTR-MCNC: 115 MG/DL — HIGH (ref 70–99)
GLUCOSE BLDC GLUCOMTR-MCNC: 118 MG/DL — HIGH (ref 70–99)
GLUCOSE BLDC GLUCOMTR-MCNC: 121 MG/DL — HIGH (ref 70–99)
GLUCOSE BLDC GLUCOMTR-MCNC: 141 MG/DL — HIGH (ref 70–99)
GLUCOSE SERPL-MCNC: 128 MG/DL — HIGH (ref 70–99)
HCT VFR BLD CALC: 37.3 % — LOW (ref 42–52)
HGB BLD-MCNC: 12.6 G/DL — LOW (ref 14–18)
IMM GRANULOCYTES NFR BLD AUTO: 0.3 % — SIGNIFICANT CHANGE UP (ref 0.1–0.3)
LYMPHOCYTES # BLD AUTO: 2.63 K/UL — SIGNIFICANT CHANGE UP (ref 1.2–3.4)
LYMPHOCYTES # BLD AUTO: 25 % — SIGNIFICANT CHANGE UP (ref 20.5–51.1)
MAGNESIUM SERPL-MCNC: 2.1 MG/DL — SIGNIFICANT CHANGE UP (ref 1.8–2.4)
MCHC RBC-ENTMCNC: 30.4 PG — SIGNIFICANT CHANGE UP (ref 27–31)
MCHC RBC-ENTMCNC: 33.8 G/DL — SIGNIFICANT CHANGE UP (ref 32–37)
MCV RBC AUTO: 89.9 FL — SIGNIFICANT CHANGE UP (ref 80–94)
METHOD TYPE: SIGNIFICANT CHANGE UP
MONOCYTES # BLD AUTO: 0.81 K/UL — HIGH (ref 0.1–0.6)
MONOCYTES NFR BLD AUTO: 7.7 % — SIGNIFICANT CHANGE UP (ref 1.7–9.3)
NEUTROPHILS # BLD AUTO: 6.26 K/UL — SIGNIFICANT CHANGE UP (ref 1.4–6.5)
NEUTROPHILS NFR BLD AUTO: 59.5 % — SIGNIFICANT CHANGE UP (ref 42.2–75.2)
NRBC # BLD: 0 /100 WBCS — SIGNIFICANT CHANGE UP (ref 0–0)
ORGANISM # SPEC MICROSCOPIC CNT: SIGNIFICANT CHANGE UP
ORGANISM # SPEC MICROSCOPIC CNT: SIGNIFICANT CHANGE UP
PLATELET # BLD AUTO: 250 K/UL — SIGNIFICANT CHANGE UP (ref 130–400)
POTASSIUM SERPL-MCNC: 4.1 MMOL/L — SIGNIFICANT CHANGE UP (ref 3.5–5)
POTASSIUM SERPL-SCNC: 4.1 MMOL/L — SIGNIFICANT CHANGE UP (ref 3.5–5)
PROT SERPL-MCNC: 5.8 G/DL — LOW (ref 6–8)
RBC # BLD: 4.15 M/UL — LOW (ref 4.7–6.1)
RBC # FLD: 14.9 % — HIGH (ref 11.5–14.5)
SODIUM SERPL-SCNC: 139 MMOL/L — SIGNIFICANT CHANGE UP (ref 135–146)
SPECIMEN SOURCE: SIGNIFICANT CHANGE UP
SPECIMEN SOURCE: SIGNIFICANT CHANGE UP
WBC # BLD: 10.52 K/UL — SIGNIFICANT CHANGE UP (ref 4.8–10.8)
WBC # FLD AUTO: 10.52 K/UL — SIGNIFICANT CHANGE UP (ref 4.8–10.8)

## 2023-03-02 PROCEDURE — 99233 SBSQ HOSP IP/OBS HIGH 50: CPT

## 2023-03-02 PROCEDURE — 93306 TTE W/DOPPLER COMPLETE: CPT | Mod: 26

## 2023-03-02 RX ORDER — CHLORHEXIDINE GLUCONATE 213 G/1000ML
1 SOLUTION TOPICAL
Refills: 0 | Status: DISCONTINUED | OUTPATIENT
Start: 2023-03-02 | End: 2023-03-08

## 2023-03-02 RX ADMIN — Medication 6 UNIT(S): at 07:56

## 2023-03-02 RX ADMIN — Medication 100 MILLIGRAM(S): at 11:05

## 2023-03-02 RX ADMIN — VALSARTAN 80 MILLIGRAM(S): 80 TABLET ORAL at 05:59

## 2023-03-02 RX ADMIN — INSULIN GLARGINE 20 UNIT(S): 100 INJECTION, SOLUTION SUBCUTANEOUS at 22:16

## 2023-03-02 RX ADMIN — HEPARIN SODIUM 5000 UNIT(S): 5000 INJECTION INTRAVENOUS; SUBCUTANEOUS at 22:16

## 2023-03-02 RX ADMIN — Medication 81 MILLIGRAM(S): at 11:30

## 2023-03-02 RX ADMIN — CHLORHEXIDINE GLUCONATE 1 APPLICATION(S): 213 SOLUTION TOPICAL at 18:29

## 2023-03-02 RX ADMIN — CARVEDILOL PHOSPHATE 12.5 MILLIGRAM(S): 80 CAPSULE, EXTENDED RELEASE ORAL at 17:03

## 2023-03-02 RX ADMIN — Medication 40 MILLIGRAM(S): at 06:10

## 2023-03-02 RX ADMIN — CARVEDILOL PHOSPHATE 12.5 MILLIGRAM(S): 80 CAPSULE, EXTENDED RELEASE ORAL at 05:58

## 2023-03-02 RX ADMIN — CEFTRIAXONE 100 MILLIGRAM(S): 500 INJECTION, POWDER, FOR SOLUTION INTRAMUSCULAR; INTRAVENOUS at 13:36

## 2023-03-02 RX ADMIN — Medication 6 UNIT(S): at 17:03

## 2023-03-02 RX ADMIN — AMLODIPINE BESYLATE 5 MILLIGRAM(S): 2.5 TABLET ORAL at 05:59

## 2023-03-02 RX ADMIN — CHLORHEXIDINE GLUCONATE 1 APPLICATION(S): 213 SOLUTION TOPICAL at 11:30

## 2023-03-02 RX ADMIN — Medication 166.67 MILLIGRAM(S): at 17:02

## 2023-03-02 RX ADMIN — HEPARIN SODIUM 5000 UNIT(S): 5000 INJECTION INTRAVENOUS; SUBCUTANEOUS at 05:59

## 2023-03-02 RX ADMIN — ATORVASTATIN CALCIUM 80 MILLIGRAM(S): 80 TABLET, FILM COATED ORAL at 22:15

## 2023-03-02 RX ADMIN — Medication 100 MILLIGRAM(S): at 22:15

## 2023-03-02 RX ADMIN — Medication 166.67 MILLIGRAM(S): at 05:59

## 2023-03-02 RX ADMIN — Medication 6 UNIT(S): at 11:30

## 2023-03-02 RX ADMIN — HEPARIN SODIUM 5000 UNIT(S): 5000 INJECTION INTRAVENOUS; SUBCUTANEOUS at 13:29

## 2023-03-02 NOTE — PROGRESS NOTE ADULT - SUBJECTIVE AND OBJECTIVE BOX
PARUL LEAHY  67y Male    INTERVAL HPI/OVERNIGHT EVENTS:    had diarrhea (watery stool last night and once today)  no abdominal pain, N/V, SOB, chest pain  mild tenderness at surgery site  + cough    T(F): 96.6 (03-02-23 @ 12:43), Max: 97.8 (03-02-23 @ 04:45)  HR: 74 (03-02-23 @ 12:43) (70 - 76)  BP: 121/64 (03-02-23 @ 12:43) (96/64 - 121/64)  RR: 18 (03-02-23 @ 12:43) (18 - 18)  SpO2: 94% (03-01-23 @ 20:50) (94% - 94%) on RA  I&O's Summary    01 Mar 2023 07:01  -  02 Mar 2023 07:00  --------------------------------------------------------  IN: 450 mL / OUT: 300 mL / NET: 150 mL    02 Mar 2023 07:01  -  02 Mar 2023 13:14  --------------------------------------------------------  IN: 330 mL / OUT: 0 mL / NET: 330 mL      CAPILLARY BLOOD GLUCOSE      POCT Blood Glucose.: 121 mg/dL (02 Mar 2023 11:18)  POCT Blood Glucose.: 141 mg/dL (02 Mar 2023 07:33)  POCT Blood Glucose.: 92 mg/dL (01 Mar 2023 21:59)  POCT Blood Glucose.: 114 mg/dL (01 Mar 2023 17:34)        PHYSICAL EXAM:  GENERAL: NAD  HEAD:  Normocephalic  EYES:  conjunctiva and sclera clear  ENMT: Moist mucous membranes  NERVOUS SYSTEM:  Alert, awake, Good concentration  CHEST/LUNG: CTA b/l  left chest wall with dressing  HEART: Regular rate and rhythm  ABDOMEN: Soft, Nontender, Nondistended  EXTREMITIES:   No edema  SKIN: warm, dry    Consultant(s) Notes Reviewed:  [x ] YES  [ ] NO  Care Discussed with Consultants/Other Providers [ x] YES  [ ] NO    MEDICATIONS  (STANDING):  allopurinol 100 milliGRAM(s) Oral two times a day  amLODIPine   Tablet 5 milliGRAM(s) Oral daily  aspirin  chewable 81 milliGRAM(s) Oral daily  atorvastatin 80 milliGRAM(s) Oral at bedtime  carvedilol 12.5 milliGRAM(s) Oral every 12 hours  cefTRIAXone   IVPB 2000 milliGRAM(s) IV Intermittent every 24 hours  chlorhexidine 2% Cloths 1 Application(s) Topical daily  dextrose 5%. 1000 milliLiter(s) (100 mL/Hr) IV Continuous <Continuous>  dextrose 5%. 1000 milliLiter(s) (50 mL/Hr) IV Continuous <Continuous>  dextrose 50% Injectable 25 Gram(s) IV Push once  dextrose 50% Injectable 12.5 Gram(s) IV Push once  dextrose 50% Injectable 25 Gram(s) IV Push once  furosemide    Tablet 40 milliGRAM(s) Oral daily  glucagon  Injectable 1 milliGRAM(s) IntraMuscular once  heparin   Injectable 5000 Unit(s) SubCutaneous every 8 hours  insulin glargine Injectable (LANTUS) 20 Unit(s) SubCutaneous at bedtime  insulin lispro (ADMELOG) corrective regimen sliding scale   SubCutaneous three times a day before meals  insulin lispro Injectable (ADMELOG) 6 Unit(s) SubCutaneous three times a day before meals  valsartan 80 milliGRAM(s) Oral daily  vancomycin  IVPB 1250 milliGRAM(s) IV Intermittent every 12 hours    MEDICATIONS  (PRN):  acetaminophen     Tablet .. 650 milliGRAM(s) Oral every 6 hours PRN Temp greater or equal to 38C (100.4F), Mild Pain (1 - 3)  aluminum hydroxide/magnesium hydroxide/simethicone Suspension 30 milliLiter(s) Oral every 4 hours PRN Dyspepsia  dextrose Oral Gel 15 Gram(s) Oral once PRN Blood Glucose LESS THAN 70 milliGRAM(s)/deciliter  melatonin 3 milliGRAM(s) Oral at bedtime PRN Insomnia  ondansetron Injectable 4 milliGRAM(s) IV Push every 8 hours PRN Nausea and/or Vomiting      LABS:                        12.6   10.52 )-----------( 250      ( 02 Mar 2023 07:56 )             37.3     03-02    139  |  107  |  35<H>  ----------------------------<  128<H>  4.1   |  20  |  1.4    Ca    8.5      02 Mar 2023 07:56  Mg     2.1     03-02    TPro  5.8<L>  /  Alb  3.6  /  TBili  0.3  /  DBili  x   /  AST  44<H>  /  ALT  39  /  AlkPhos  123<H>  03-02        Culture - Other (collected 27 Feb 2023 19:49)  Source: .Other None  Preliminary Report (01 Mar 2023 19:11):    Numerous Staphylococcus lugdunensis    Culture - Other (collected 27 Feb 2023 19:48)  Source: .Other None  Final Report (02 Mar 2023 10:43):    No growth at 48 hours              Case discussed with residents and RN on rounds today    Care discussed with pt

## 2023-03-02 NOTE — PROGRESS NOTE ADULT - ASSESSMENT
66 y/o man with PMH of CAD, s/p PCI x 2 in 2018, MI, CHF, s/p [employer] AICD that was placed in [place] in 2019, DM, HTN and hyperlipidemia Presents for redness, swelling and discomfort over AICD site. Cardiology: Dr. Rock    1. Abscess of AICD pocket   wound culture: Staph lugdunensis - f/u sensitivites  ID f/u  on vancomycin and rocephin - last Vanco trough 20.6 - continue to monitor  s/p removal of AICD on 2/27 by CT surgery - wound care per surgery  f/u pathology  blood cultures negative x 3  pain control  continue telemetry  awaiting ECHO  EP f/u    2. DM type 2 on Trulicity at home  on lantus and lispro inpt and monitor FS  A1C 7.1    3. Chronic HFrEF  pt is euvolemic  on lasix and coreg  check ECHO    4. CAD s/p PCI x 2, MI  on ASA/statin/coreg/valsartan  on Brilinta at home - held for now    5. HTN - continue current management - BP acceptable    6. Gout  - c/w home allopurinol     7. CKD 3 - stable - monitor Vanco level    8. COVID positive  pt with mild cough, otherwise no symptoms  no further fever which may have been due to the abscess  CT scan of chest: no GGO  stable on RA  isolation per protocol    9. DVT prophylaxis     10. Diarrhea x 2 - monitor - likely due to abx - if over 3 stools in 24hrs, send for C diff    full code  guarded prognosis  high risk pt      PROGRESS NOTE HANDOFF    Pending: ECHO, f/u sensitivities on wound culture, labs in AM, EP f/u    pt aware of the plan of care    Disposition: Home

## 2023-03-03 LAB
ALBUMIN SERPL ELPH-MCNC: 3.5 G/DL — SIGNIFICANT CHANGE UP (ref 3.5–5.2)
ALP SERPL-CCNC: 113 U/L — SIGNIFICANT CHANGE UP (ref 30–115)
ALT FLD-CCNC: 29 U/L — SIGNIFICANT CHANGE UP (ref 0–41)
ANION GAP SERPL CALC-SCNC: 10 MMOL/L — SIGNIFICANT CHANGE UP (ref 7–14)
AST SERPL-CCNC: 24 U/L — SIGNIFICANT CHANGE UP (ref 0–41)
BASOPHILS # BLD AUTO: 0.15 K/UL — SIGNIFICANT CHANGE UP (ref 0–0.2)
BASOPHILS NFR BLD AUTO: 1.6 % — HIGH (ref 0–1)
BILIRUB SERPL-MCNC: 0.5 MG/DL — SIGNIFICANT CHANGE UP (ref 0.2–1.2)
BUN SERPL-MCNC: 26 MG/DL — HIGH (ref 10–20)
CALCIUM SERPL-MCNC: 8.6 MG/DL — SIGNIFICANT CHANGE UP (ref 8.4–10.5)
CHLORIDE SERPL-SCNC: 108 MMOL/L — SIGNIFICANT CHANGE UP (ref 98–110)
CO2 SERPL-SCNC: 22 MMOL/L — SIGNIFICANT CHANGE UP (ref 17–32)
CREAT SERPL-MCNC: 1.2 MG/DL — SIGNIFICANT CHANGE UP (ref 0.7–1.5)
CULTURE RESULTS: SIGNIFICANT CHANGE UP
EGFR: 67 ML/MIN/1.73M2 — SIGNIFICANT CHANGE UP
EOSINOPHIL # BLD AUTO: 0.72 K/UL — HIGH (ref 0–0.7)
EOSINOPHIL NFR BLD AUTO: 7.9 % — SIGNIFICANT CHANGE UP (ref 0–8)
GLUCOSE BLDC GLUCOMTR-MCNC: 104 MG/DL — HIGH (ref 70–99)
GLUCOSE BLDC GLUCOMTR-MCNC: 110 MG/DL — HIGH (ref 70–99)
GLUCOSE BLDC GLUCOMTR-MCNC: 135 MG/DL — HIGH (ref 70–99)
GLUCOSE BLDC GLUCOMTR-MCNC: 94 MG/DL — SIGNIFICANT CHANGE UP (ref 70–99)
GLUCOSE SERPL-MCNC: 109 MG/DL — HIGH (ref 70–99)
HCT VFR BLD CALC: 35.9 % — LOW (ref 42–52)
HGB BLD-MCNC: 12 G/DL — LOW (ref 14–18)
IMM GRANULOCYTES NFR BLD AUTO: 0.3 % — SIGNIFICANT CHANGE UP (ref 0.1–0.3)
LYMPHOCYTES # BLD AUTO: 2.04 K/UL — SIGNIFICANT CHANGE UP (ref 1.2–3.4)
LYMPHOCYTES # BLD AUTO: 22.2 % — SIGNIFICANT CHANGE UP (ref 20.5–51.1)
MAGNESIUM SERPL-MCNC: 2 MG/DL — SIGNIFICANT CHANGE UP (ref 1.8–2.4)
MCHC RBC-ENTMCNC: 30.5 PG — SIGNIFICANT CHANGE UP (ref 27–31)
MCHC RBC-ENTMCNC: 33.4 G/DL — SIGNIFICANT CHANGE UP (ref 32–37)
MCV RBC AUTO: 91.1 FL — SIGNIFICANT CHANGE UP (ref 80–94)
MONOCYTES # BLD AUTO: 0.9 K/UL — HIGH (ref 0.1–0.6)
MONOCYTES NFR BLD AUTO: 9.8 % — HIGH (ref 1.7–9.3)
NEUTROPHILS # BLD AUTO: 5.33 K/UL — SIGNIFICANT CHANGE UP (ref 1.4–6.5)
NEUTROPHILS NFR BLD AUTO: 58.2 % — SIGNIFICANT CHANGE UP (ref 42.2–75.2)
NRBC # BLD: 0 /100 WBCS — SIGNIFICANT CHANGE UP (ref 0–0)
PLATELET # BLD AUTO: 228 K/UL — SIGNIFICANT CHANGE UP (ref 130–400)
POTASSIUM SERPL-MCNC: 3.9 MMOL/L — SIGNIFICANT CHANGE UP (ref 3.5–5)
POTASSIUM SERPL-SCNC: 3.9 MMOL/L — SIGNIFICANT CHANGE UP (ref 3.5–5)
PROT SERPL-MCNC: 5.5 G/DL — LOW (ref 6–8)
RBC # BLD: 3.94 M/UL — LOW (ref 4.7–6.1)
RBC # FLD: 14.8 % — HIGH (ref 11.5–14.5)
SODIUM SERPL-SCNC: 140 MMOL/L — SIGNIFICANT CHANGE UP (ref 135–146)
SPECIMEN SOURCE: SIGNIFICANT CHANGE UP
VANCOMYCIN TROUGH SERPL-MCNC: 25.9 UG/ML — HIGH (ref 5–10)
WBC # BLD: 9.17 K/UL — SIGNIFICANT CHANGE UP (ref 4.8–10.8)
WBC # FLD AUTO: 9.17 K/UL — SIGNIFICANT CHANGE UP (ref 4.8–10.8)

## 2023-03-03 PROCEDURE — 99232 SBSQ HOSP IP/OBS MODERATE 35: CPT

## 2023-03-03 RX ORDER — LINEZOLID 600 MG/300ML
600 INJECTION, SOLUTION INTRAVENOUS EVERY 12 HOURS
Refills: 0 | Status: DISCONTINUED | OUTPATIENT
Start: 2023-03-03 | End: 2023-03-08

## 2023-03-03 RX ADMIN — AMLODIPINE BESYLATE 5 MILLIGRAM(S): 2.5 TABLET ORAL at 06:17

## 2023-03-03 RX ADMIN — Medication 6 UNIT(S): at 08:20

## 2023-03-03 RX ADMIN — Medication 81 MILLIGRAM(S): at 11:26

## 2023-03-03 RX ADMIN — CHLORHEXIDINE GLUCONATE 1 APPLICATION(S): 213 SOLUTION TOPICAL at 06:16

## 2023-03-03 RX ADMIN — Medication 6 UNIT(S): at 11:26

## 2023-03-03 RX ADMIN — Medication 100 MILLIGRAM(S): at 22:18

## 2023-03-03 RX ADMIN — HEPARIN SODIUM 5000 UNIT(S): 5000 INJECTION INTRAVENOUS; SUBCUTANEOUS at 22:18

## 2023-03-03 RX ADMIN — CARVEDILOL PHOSPHATE 12.5 MILLIGRAM(S): 80 CAPSULE, EXTENDED RELEASE ORAL at 18:31

## 2023-03-03 RX ADMIN — ATORVASTATIN CALCIUM 80 MILLIGRAM(S): 80 TABLET, FILM COATED ORAL at 22:18

## 2023-03-03 RX ADMIN — VALSARTAN 80 MILLIGRAM(S): 80 TABLET ORAL at 06:16

## 2023-03-03 RX ADMIN — CARVEDILOL PHOSPHATE 12.5 MILLIGRAM(S): 80 CAPSULE, EXTENDED RELEASE ORAL at 06:18

## 2023-03-03 RX ADMIN — CHLORHEXIDINE GLUCONATE 1 APPLICATION(S): 213 SOLUTION TOPICAL at 18:31

## 2023-03-03 RX ADMIN — Medication 100 MILLIGRAM(S): at 11:26

## 2023-03-03 RX ADMIN — HEPARIN SODIUM 5000 UNIT(S): 5000 INJECTION INTRAVENOUS; SUBCUTANEOUS at 13:09

## 2023-03-03 RX ADMIN — Medication 40 MILLIGRAM(S): at 06:17

## 2023-03-03 RX ADMIN — HEPARIN SODIUM 5000 UNIT(S): 5000 INJECTION INTRAVENOUS; SUBCUTANEOUS at 06:16

## 2023-03-03 RX ADMIN — CHLORHEXIDINE GLUCONATE 1 APPLICATION(S): 213 SOLUTION TOPICAL at 11:27

## 2023-03-03 RX ADMIN — CEFTRIAXONE 100 MILLIGRAM(S): 500 INJECTION, POWDER, FOR SOLUTION INTRAMUSCULAR; INTRAVENOUS at 13:14

## 2023-03-03 RX ADMIN — INSULIN GLARGINE 20 UNIT(S): 100 INJECTION, SOLUTION SUBCUTANEOUS at 22:18

## 2023-03-03 NOTE — PROGRESS NOTE ADULT - SUBJECTIVE AND OBJECTIVE BOX
PARUL LEAHY  67y, Male    All available historical data reviewed    OVERNIGHT EVENTS:  feels well and has no new complaints  No fevers     ROS:  General: Denies rigors, nightsweats  HEENT: Denies headache, rhinorrhea, sore throat, eye pain  CV: Denies CP, palpitations  PULM: Denies wheezing, hemoptysis  GI: Denies hematemesis, hematochezia, melena  : Denies discharge, hematuria  MSK: Denies arthralgias, myalgias  SKIN: Denies rash, lesions  NEURO: Denies paresthesias, weakness  PSYCH: Denies depression, anxiety    VITALS:  T(F): 97.6, Max: 98.1 (03-02-23 @ 19:55)  HR: 65  BP: 96/68  RR: 18Vital Signs Last 24 Hrs  T(C): 36.4 (03 Mar 2023 12:22), Max: 36.7 (02 Mar 2023 19:55)  T(F): 97.6 (03 Mar 2023 12:22), Max: 98.1 (02 Mar 2023 19:55)  HR: 65 (03 Mar 2023 12:22) (65 - 70)  BP: 96/68 (03 Mar 2023 12:22) (96/68 - 116/65)  BP(mean): --  RR: 18 (03 Mar 2023 12:22) (18 - 18)  SpO2: 97% (03 Mar 2023 07:55) (97% - 99%)    Parameters below as of 03 Mar 2023 07:55  Patient On (Oxygen Delivery Method): room air        TESTS & MEASUREMENTS:                        12.0   9.17  )-----------( 228      ( 03 Mar 2023 07:36 )             35.9     03-03    140  |  108  |  26<H>  ----------------------------<  109<H>  3.9   |  22  |  1.2    Ca    8.6      03 Mar 2023 07:36  Mg     2.0     03-03    TPro  5.5<L>  /  Alb  3.5  /  TBili  0.5  /  DBili  x   /  AST  24  /  ALT  29  /  AlkPhos  113  03-03    LIVER FUNCTIONS - ( 03 Mar 2023 07:36 )  Alb: 3.5 g/dL / Pro: 5.5 g/dL / ALK PHOS: 113 U/L / ALT: 29 U/L / AST: 24 U/L / GGT: x             Culture - Other (collected 02-27-23 @ 19:49)  Source: .Other None  Final Report (03-02-23 @ 15:12):    Numerous Staphylococcus lugdunensis  Organism: Staphylococcus lugdunensis (03-02-23 @ 15:12)  Organism: Staphylococcus lugdunensis (03-02-23 @ 15:12)      -  Ampicillin/Sulbactam: S <=8/4      -  Cefazolin: S <=4      -  Clindamycin: S <=0.25      -  Erythromycin: S <=0.25      -  Gentamicin: S <=1 Should not be used as monotherapy      -  Oxacillin: S 1      -  Rifampin: S <=1 Should not be used as monotherapy      -  Tetracycline: S <=1      -  Trimethoprim/Sulfamethoxazole: S <=0.5/9.5      -  Vancomycin: S 0.5      Method Type: MIRELLA    Culture - Other (collected 02-27-23 @ 19:48)  Source: .Other None  Final Report (03-02-23 @ 10:43):    No growth at 48 hours    Culture - Blood (collected 02-26-23 @ 06:55)  Source: .Blood Blood  Final Report (03-03-23 @ 13:01):    No Growth Final    Culture - Blood (collected 02-25-23 @ 16:44)  Source: .Blood Blood  Final Report (03-03-23 @ 05:00):    No Growth Final    Culture - Blood (collected 02-25-23 @ 16:44)  Source: .Blood Blood  Final Report (03-03-23 @ 05:00):    No Growth Final            RADIOLOGY & ADDITIONAL TESTS:  Personal review of radiological diagnostics performed  Echo and EKG results noted when applicable.     MEDICATIONS:  acetaminophen     Tablet .. 650 milliGRAM(s) Oral every 6 hours PRN  allopurinol 100 milliGRAM(s) Oral two times a day  aluminum hydroxide/magnesium hydroxide/simethicone Suspension 30 milliLiter(s) Oral every 4 hours PRN  amLODIPine   Tablet 5 milliGRAM(s) Oral daily  aspirin  chewable 81 milliGRAM(s) Oral daily  atorvastatin 80 milliGRAM(s) Oral at bedtime  carvedilol 12.5 milliGRAM(s) Oral every 12 hours  cefTRIAXone   IVPB 2000 milliGRAM(s) IV Intermittent every 24 hours  chlorhexidine 2% Cloths 1 Application(s) Topical daily  chlorhexidine 2% Cloths 1 Application(s) Topical two times a day  dextrose 5%. 1000 milliLiter(s) IV Continuous <Continuous>  dextrose 5%. 1000 milliLiter(s) IV Continuous <Continuous>  dextrose 50% Injectable 25 Gram(s) IV Push once  dextrose 50% Injectable 12.5 Gram(s) IV Push once  dextrose 50% Injectable 25 Gram(s) IV Push once  dextrose Oral Gel 15 Gram(s) Oral once PRN  furosemide    Tablet 40 milliGRAM(s) Oral daily  glucagon  Injectable 1 milliGRAM(s) IntraMuscular once  heparin   Injectable 5000 Unit(s) SubCutaneous every 8 hours  insulin glargine Injectable (LANTUS) 20 Unit(s) SubCutaneous at bedtime  insulin lispro (ADMELOG) corrective regimen sliding scale   SubCutaneous three times a day before meals  insulin lispro Injectable (ADMELOG) 6 Unit(s) SubCutaneous three times a day before meals  melatonin 3 milliGRAM(s) Oral at bedtime PRN  ondansetron Injectable 4 milliGRAM(s) IV Push every 8 hours PRN  valsartan 80 milliGRAM(s) Oral daily  vancomycin  IVPB 1250 milliGRAM(s) IV Intermittent every 12 hours      ANTIBIOTICS:  cefTRIAXone   IVPB 2000 milliGRAM(s) IV Intermittent every 24 hours  vancomycin  IVPB 1250 milliGRAM(s) IV Intermittent every 12 hours

## 2023-03-03 NOTE — PROGRESS NOTE ADULT - ASSESSMENT
67-year-old male with CAD, s/p PCI x 2 2018, CHF, diabetes, hypertension, high cholesterol, CHF,[employer] aicd that was placed in [place] in 2019 post MI  CT chest with IV contrast : Collection adjacent to ICD within the left chest subcutaneous tissues measuring 2.6 x 2.3 x 1.7 cm.    IMPRESSION;  S/p debridement for left AICD pocket space abscess which has resolved with local cultures with S cristinaunensis  2/27 S/p explant  No sepsis  2/25,26 BCx NGTD    RECOMMENDATIONS;  Vancomycin 1250 mg iv q12h  Rocephin 2 gm iv q24h  Change iv to po Linezolid 600 mg q12h for 7 more days  Could proceed with reimplant from ID standpoint with linezolid to be continued 7 days post implant  Please do not hesitate to recall ID if any questions arise either through [employer] or through [employer]

## 2023-03-03 NOTE — PROGRESS NOTE ADULT - ASSESSMENT
66 y/o man with PMH of CAD, s/p PCI x 2 in 2018, MI, CHF, s/p [employer] AICD that was placed in [place] in 2019, DM, HTN and hyperlipidemia Presents for redness, swelling and discomfort over AICD site. Cardiology: Dr. Rock    1. Abscess of AICD pocket   wound culture: Staph lugdunensis - sensitivities reviewed  ID f/u re: abx and duration   on vancomycin and rocephin - last Vanco trough 26 and Vanco held this morning   s/p removal of AICD on 2/27 by CT surgery - wound care per surgery  pathology: infected sebaceous cyst  blood cultures negative x 3  pain control  continue telemetry  ECHO report reviewed  EP f/u    2. DM type 2 on Trulicity at home  on lantus and lispro inpt and monitor FS  A1C 7.1    3. 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 CAD s/p PCI x 2, MI  on ASA/statin/coreg/valsartan  on Brilinta at home - held for now    5. HTN - continue current management - BP acceptable    6. Gout  - c/w home allopurinol     7. CKD 3 - stable - monitor Vanco level    8. COVID positive  pt with mild cough, otherwise no symptoms  no further fever which may have been due to the abscess  CT scan of chest: no GGO  stable on RA  isolation per protocol    9. DVT prophylaxis     10. Diarrhea - monitor - likely due to abx - if over 3 stools in 24hrs, send for C diff    full code  guarded prognosis  high risk pt      PROGRESS NOTE HANDOFF    Pending: ID and EP f/u, labs in AM    pt aware of the plan of care    Disposition: Home

## 2023-03-03 NOTE — PROGRESS NOTE ADULT - SUBJECTIVE AND OBJECTIVE BOX
PARUL LEAHY  67y Male    INTERVAL HPI/OVERNIGHT EVENTS:    still with some diarrhea today  no fever, pain, SOB, N/V.     T(F): 97.6 (23 @ 12:22), Max: 98.1 (23 @ 19:55)  HR: 65 (23 @ 12:22) (65 - 70)  BP: 96/68 (23 @ 12:22) (96/68 - 116/65)  RR: 18 (23 @ 12:22) (18 - 18)  SpO2: 97% (23 @ 07:55) (97% - 99%) on RA    I&O's Summary    02 Mar 2023 07:01  -  03 Mar 2023 07:00  --------------------------------------------------------  IN: 1750 mL / OUT: 400 mL / NET: 1350 mL    03 Mar 2023 07:01  -  03 Mar 2023 12:56  --------------------------------------------------------  IN: 210 mL / OUT: 0 mL / NET: 210 mL        Daily Weight in k.6 (03 Mar 2023 04:29)    CAPILLARY BLOOD GLUCOSE      POCT Blood Glucose.: 135 mg/dL (03 Mar 2023 11:11)  POCT Blood Glucose.: 104 mg/dL (03 Mar 2023 07:33)  POCT Blood Glucose.: 118 mg/dL (02 Mar 2023 21:34)  POCT Blood Glucose.: 115 mg/dL (02 Mar 2023 16:42)        PHYSICAL EXAM:  GENERAL: NAD  HEAD:  Normocephalic  EYES:  conjunctiva and sclera clear  ENMT: Moist mucous membranes  NECK: Supple  NERVOUS SYSTEM:  Alert, awake, Good concentration  CHEST/LUNG: CTA b/l  left chest wall dressing  HEART: Regular rate and rhythm  ABDOMEN: Soft, Nontender, Nondistended  EXTREMITIES: No edema  SKIN: warm, dry    Consultant(s) Notes Reviewed:  [x ] YES  [ ] NO  Care Discussed with Consultants/Other Providers [ x] YES  [ ] NO    MEDICATIONS  (STANDING):  allopurinol 100 milliGRAM(s) Oral two times a day  amLODIPine   Tablet 5 milliGRAM(s) Oral daily  aspirin  chewable 81 milliGRAM(s) Oral daily  atorvastatin 80 milliGRAM(s) Oral at bedtime  carvedilol 12.5 milliGRAM(s) Oral every 12 hours  cefTRIAXone   IVPB 2000 milliGRAM(s) IV Intermittent every 24 hours  chlorhexidine 2% Cloths 1 Application(s) Topical daily  chlorhexidine 2% Cloths 1 Application(s) Topical two times a day  dextrose 5%. 1000 milliLiter(s) (50 mL/Hr) IV Continuous <Continuous>  dextrose 5%. 1000 milliLiter(s) (100 mL/Hr) IV Continuous <Continuous>  dextrose 50% Injectable 25 Gram(s) IV Push once  dextrose 50% Injectable 12.5 Gram(s) IV Push once  dextrose 50% Injectable 25 Gram(s) IV Push once  furosemide    Tablet 40 milliGRAM(s) Oral daily  glucagon  Injectable 1 milliGRAM(s) IntraMuscular once  heparin   Injectable 5000 Unit(s) SubCutaneous every 8 hours  insulin glargine Injectable (LANTUS) 20 Unit(s) SubCutaneous at bedtime  insulin lispro (ADMELOG) corrective regimen sliding scale   SubCutaneous three times a day before meals  insulin lispro Injectable (ADMELOG) 6 Unit(s) SubCutaneous three times a day before meals  valsartan 80 milliGRAM(s) Oral daily  vancomycin  IVPB 1250 milliGRAM(s) IV Intermittent every 12 hours    MEDICATIONS  (PRN):  acetaminophen     Tablet .. 650 milliGRAM(s) Oral every 6 hours PRN Temp greater or equal to 38C (100.4F), Mild Pain (1 - 3)  aluminum hydroxide/magnesium hydroxide/simethicone Suspension 30 milliLiter(s) Oral every 4 hours PRN Dyspepsia  dextrose Oral Gel 15 Gram(s) Oral once PRN Blood Glucose LESS THAN 70 milliGRAM(s)/deciliter  melatonin 3 milliGRAM(s) Oral at bedtime PRN Insomnia  ondansetron Injectable 4 milliGRAM(s) IV Push every 8 hours PRN Nausea and/or Vomiting      Telemetry reviewed by me    LABS:                        12.0   9.17  )-----------( 228      ( 03 Mar 2023 07:36 )             35.9     03-03    140  |  108  |  26<H>  ----------------------------<  109<H>  3.9   |  22  |  1.2    Ca    8.6      03 Mar 2023 07:36  Mg     2.0     03-03    TPro  5.5<L>  /  Alb  3.5  /  TBili  0.5  /  DBili  x   /  AST  24  /  ALT  29  /  AlkPhos  113  03-03              RADIOLOGY & ADDITIONAL TESTS:    Imaging or report Personally Reviewed:  [ ] YES  [ ] NO    < from: TTE Echo Complete w/o Contrast w/ Doppler (23 @ 11:50) >    Summary:   1. Left ventricular ejection fraction, by visual estimation, is 30 to   35%.   2. Multiple left ventricular regional wall motion abnormalities exist.   See wall motion findings.   3. Spectral Doppler shows impaired relaxation pattern of left   ventricular myocardial filling (Grade I diastolic dysfunction).   4. Technically difficult study. Mid and apical anteroseptal walls and   apex apears severely hypokinetic / dyskinetic. Poorly visualized   endocardial borders. 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   5. Normal left atrial size.   6. Normal right atrial size.   7. No evidence of mitral valve regurgitation.   8. LA volume Index is 15.6 ml/m² ml/m2.    < end of copied text >      Case discussed with residents and RN on rounds today    Care discussed with pt

## 2023-03-04 LAB
ALBUMIN SERPL ELPH-MCNC: 3.5 G/DL — SIGNIFICANT CHANGE UP (ref 3.5–5.2)
ALP SERPL-CCNC: 113 U/L — SIGNIFICANT CHANGE UP (ref 30–115)
ALT FLD-CCNC: 27 U/L — SIGNIFICANT CHANGE UP (ref 0–41)
ANION GAP SERPL CALC-SCNC: 10 MMOL/L — SIGNIFICANT CHANGE UP (ref 7–14)
AST SERPL-CCNC: 22 U/L — SIGNIFICANT CHANGE UP (ref 0–41)
BASOPHILS # BLD AUTO: 0.11 K/UL — SIGNIFICANT CHANGE UP (ref 0–0.2)
BASOPHILS NFR BLD AUTO: 1.2 % — HIGH (ref 0–1)
BILIRUB SERPL-MCNC: 0.5 MG/DL — SIGNIFICANT CHANGE UP (ref 0.2–1.2)
BUN SERPL-MCNC: 26 MG/DL — HIGH (ref 10–20)
CALCIUM SERPL-MCNC: 8.9 MG/DL — SIGNIFICANT CHANGE UP (ref 8.4–10.4)
CHLORIDE SERPL-SCNC: 107 MMOL/L — SIGNIFICANT CHANGE UP (ref 98–110)
CO2 SERPL-SCNC: 23 MMOL/L — SIGNIFICANT CHANGE UP (ref 17–32)
CREAT SERPL-MCNC: 1.2 MG/DL — SIGNIFICANT CHANGE UP (ref 0.7–1.5)
EGFR: 67 ML/MIN/1.73M2 — SIGNIFICANT CHANGE UP
EOSINOPHIL # BLD AUTO: 0.76 K/UL — HIGH (ref 0–0.7)
EOSINOPHIL NFR BLD AUTO: 8.5 % — HIGH (ref 0–8)
GLUCOSE BLDC GLUCOMTR-MCNC: 114 MG/DL — HIGH (ref 70–99)
GLUCOSE BLDC GLUCOMTR-MCNC: 121 MG/DL — HIGH (ref 70–99)
GLUCOSE BLDC GLUCOMTR-MCNC: 125 MG/DL — HIGH (ref 70–99)
GLUCOSE BLDC GLUCOMTR-MCNC: 131 MG/DL — HIGH (ref 70–99)
GLUCOSE SERPL-MCNC: 100 MG/DL — HIGH (ref 70–99)
HCT VFR BLD CALC: 36.5 % — LOW (ref 42–52)
HGB BLD-MCNC: 12.1 G/DL — LOW (ref 14–18)
IMM GRANULOCYTES NFR BLD AUTO: 0.2 % — SIGNIFICANT CHANGE UP (ref 0.1–0.3)
LYMPHOCYTES # BLD AUTO: 2.09 K/UL — SIGNIFICANT CHANGE UP (ref 1.2–3.4)
LYMPHOCYTES # BLD AUTO: 23.4 % — SIGNIFICANT CHANGE UP (ref 20.5–51.1)
MAGNESIUM SERPL-MCNC: 1.9 MG/DL — SIGNIFICANT CHANGE UP (ref 1.8–2.4)
MCHC RBC-ENTMCNC: 30.1 PG — SIGNIFICANT CHANGE UP (ref 27–31)
MCHC RBC-ENTMCNC: 33.2 G/DL — SIGNIFICANT CHANGE UP (ref 32–37)
MCV RBC AUTO: 90.8 FL — SIGNIFICANT CHANGE UP (ref 80–94)
MONOCYTES # BLD AUTO: 0.76 K/UL — HIGH (ref 0.1–0.6)
MONOCYTES NFR BLD AUTO: 8.5 % — SIGNIFICANT CHANGE UP (ref 1.7–9.3)
NEUTROPHILS # BLD AUTO: 5.19 K/UL — SIGNIFICANT CHANGE UP (ref 1.4–6.5)
NEUTROPHILS NFR BLD AUTO: 58.2 % — SIGNIFICANT CHANGE UP (ref 42.2–75.2)
NRBC # BLD: 0 /100 WBCS — SIGNIFICANT CHANGE UP (ref 0–0)
PLATELET # BLD AUTO: 237 K/UL — SIGNIFICANT CHANGE UP (ref 130–400)
POTASSIUM SERPL-MCNC: 4.2 MMOL/L — SIGNIFICANT CHANGE UP (ref 3.5–5)
POTASSIUM SERPL-SCNC: 4.2 MMOL/L — SIGNIFICANT CHANGE UP (ref 3.5–5)
PROT SERPL-MCNC: 5.5 G/DL — LOW (ref 6–8)
RBC # BLD: 4.02 M/UL — LOW (ref 4.7–6.1)
RBC # FLD: 14.7 % — HIGH (ref 11.5–14.5)
SODIUM SERPL-SCNC: 140 MMOL/L — SIGNIFICANT CHANGE UP (ref 135–146)
WBC # BLD: 8.93 K/UL — SIGNIFICANT CHANGE UP (ref 4.8–10.8)
WBC # FLD AUTO: 8.93 K/UL — SIGNIFICANT CHANGE UP (ref 4.8–10.8)

## 2023-03-04 PROCEDURE — 99232 SBSQ HOSP IP/OBS MODERATE 35: CPT

## 2023-03-04 RX ADMIN — CARVEDILOL PHOSPHATE 12.5 MILLIGRAM(S): 80 CAPSULE, EXTENDED RELEASE ORAL at 17:14

## 2023-03-04 RX ADMIN — ATORVASTATIN CALCIUM 80 MILLIGRAM(S): 80 TABLET, FILM COATED ORAL at 21:56

## 2023-03-04 RX ADMIN — LINEZOLID 600 MILLIGRAM(S): 600 INJECTION, SOLUTION INTRAVENOUS at 06:19

## 2023-03-04 RX ADMIN — Medication 6 UNIT(S): at 11:45

## 2023-03-04 RX ADMIN — Medication 6 UNIT(S): at 08:16

## 2023-03-04 RX ADMIN — CHLORHEXIDINE GLUCONATE 1 APPLICATION(S): 213 SOLUTION TOPICAL at 17:09

## 2023-03-04 RX ADMIN — CHLORHEXIDINE GLUCONATE 1 APPLICATION(S): 213 SOLUTION TOPICAL at 11:43

## 2023-03-04 RX ADMIN — CHLORHEXIDINE GLUCONATE 1 APPLICATION(S): 213 SOLUTION TOPICAL at 06:20

## 2023-03-04 RX ADMIN — Medication 40 MILLIGRAM(S): at 06:20

## 2023-03-04 RX ADMIN — VALSARTAN 80 MILLIGRAM(S): 80 TABLET ORAL at 06:19

## 2023-03-04 RX ADMIN — HEPARIN SODIUM 5000 UNIT(S): 5000 INJECTION INTRAVENOUS; SUBCUTANEOUS at 13:02

## 2023-03-04 RX ADMIN — HEPARIN SODIUM 5000 UNIT(S): 5000 INJECTION INTRAVENOUS; SUBCUTANEOUS at 06:18

## 2023-03-04 RX ADMIN — HEPARIN SODIUM 5000 UNIT(S): 5000 INJECTION INTRAVENOUS; SUBCUTANEOUS at 21:56

## 2023-03-04 RX ADMIN — Medication 100 MILLIGRAM(S): at 21:56

## 2023-03-04 RX ADMIN — Medication 81 MILLIGRAM(S): at 11:45

## 2023-03-04 RX ADMIN — CARVEDILOL PHOSPHATE 12.5 MILLIGRAM(S): 80 CAPSULE, EXTENDED RELEASE ORAL at 06:19

## 2023-03-04 RX ADMIN — INSULIN GLARGINE 20 UNIT(S): 100 INJECTION, SOLUTION SUBCUTANEOUS at 21:56

## 2023-03-04 RX ADMIN — LINEZOLID 600 MILLIGRAM(S): 600 INJECTION, SOLUTION INTRAVENOUS at 17:14

## 2023-03-04 RX ADMIN — Medication 6 UNIT(S): at 16:55

## 2023-03-04 RX ADMIN — AMLODIPINE BESYLATE 5 MILLIGRAM(S): 2.5 TABLET ORAL at 06:19

## 2023-03-04 RX ADMIN — Medication 100 MILLIGRAM(S): at 09:19

## 2023-03-04 NOTE — PROGRESS NOTE ADULT - SUBJECTIVE AND OBJECTIVE BOX
PARUL LEAHY  67y, Male  Allergy: No Known Allergies      OVERNIGHT EVENTS:  NONE NOTED    PAST MEDICAL & SURGICAL HISTORY:  HTN  MI w/ CAD s/p PCI x 2 (2018)  CHF s/p AICD  HLD  DM, type 2      T(C): 36.5 (03-04-23 @ 12:59), Max: 36.6 (03-03-23 @ 20:16)  HR: 66 (03-04-23 @ 12:59) (65 - 66)  BP: 91/56 (03-04-23 @ 12:59) (91/56 - 110/69)  RR: 18 (03-04-23 @ 12:59) (18 - 18)  SpO2: 97% (03-04-23 @ 12:59) (97% - 97%)    CONSTITUTIONAL: No apparent distress  EYES:  No conjunctival or scleral injection, non-icteric  ENMT: Oral mucosa with moist membranes.   RESP:  CTA b/l, no WRR  CV: RRR, +S1S2  s/p removal of ICD L chest  GI: Soft, NT, ND,       VITALS:  T(F): 97.7 (03-04-23 @ 12:59), Max: 97.9 (03-03-23 @ 20:16)  HR: 66 (03-04-23 @ 12:59)  BP: 91/56 (03-04-23 @ 12:59) (91/56 - 110/69)  RR: 18 (03-04-23 @ 12:59)  SpO2: 97% (03-04-23 @ 12:59)    TESTS & MEASUREMENTS:      03-02-23 @ 07:01  -  03-03-23 @ 07:00  --------------------------------------------------------  IN: 1750 mL / OUT: 400 mL / NET: 1350 mL    03-03-23 @ 07:01  -  03-04-23 @ 07:00  --------------------------------------------------------  IN: 450 mL / OUT: 0 mL / NET: 450 mL                            12.1   8.93  )-----------( 237      ( 04 Mar 2023 05:49 )             36.5       03-04    140  |  107  |  26<H>  ----------------------------<  100<H>  4.2   |  23  |  1.2    Ca    8.9      04 Mar 2023 05:49  Mg     1.9     03-04    TPro  5.5<L>  /  Alb  3.5  /  TBili  0.5  /  DBili  x   /  AST  22  /  ALT  27  /  AlkPhos  113  03-04    LIVER FUNCTIONS - ( 04 Mar 2023 05:49 )  Alb: 3.5 g/dL / Pro: 5.5 g/dL / ALK PHOS: 113 U/L / ALT: 27 U/L / AST: 22 U/L / GGT: x           CAPILLARY BLOOD GLUCOSE  POCT Blood Glucose.: 131 mg/dL (04 Mar 2023 11:27)  POCT Blood Glucose.: 114 mg/dL (04 Mar 2023 07:58)  POCT Blood Glucose.: 110 mg/dL (03 Mar 2023 21:34)  POCT Blood Glucose.: 94 mg/dL (03 Mar 2023 16:21)    Culture - Other (collected 02-27-23 @ 19:49)  Source: .Other None  Final Report (03-02-23 @ 15:12):    Numerous Staphylococcus lugdunensis  Organism: Staphylococcus lugdunensis (03-02-23 @ 15:12)  Organism: Staphylococcus lugdunensis (03-02-23 @ 15:12)      -  Ampicillin/Sulbactam: S <=8/4      -  Cefazolin: S <=4      -  Clindamycin: S <=0.25      -  Erythromycin: S <=0.25      -  Gentamicin: S <=1 Should not be used as monotherapy      -  Oxacillin: S 1      -  Rifampin: S <=1 Should not be used as monotherapy      -  Tetracycline: S <=1      -  Trimethoprim/Sulfamethoxazole: S <=0.5/9.5      -  Vancomycin: S 0.5      Method Type: MIRELLA    Culture - Other (collected 02-27-23 @ 19:48)  Source: .Other None  Final Report (03-02-23 @ 10:43):    No growth at 48 hours    Culture - Blood (collected 02-26-23 @ 06:55)  Source: .Blood Blood  Final Report (03-03-23 @ 13:01):    No Growth Final    Culture - Blood (collected 02-25-23 @ 16:44)  Source: .Blood Blood  Final Report (03-03-23 @ 05:00):    No Growth Final    Culture - Blood (collected 02-25-23 @ 16:44)  Source: .Blood Blood  Final Report (03-03-23 @ 05:00):    No Growth Final    MEDICATIONS:  MEDICATIONS  (STANDING):  allopurinol 100 milliGRAM(s) Oral two times a day  amLODIPine   Tablet 5 milliGRAM(s) Oral daily  aspirin  chewable 81 milliGRAM(s) Oral daily  atorvastatin 80 milliGRAM(s) Oral at bedtime  carvedilol 12.5 milliGRAM(s) Oral every 12 hours  furosemide    Tablet 40 milliGRAM(s) Oral daily  glucagon  Injectable 1 milliGRAM(s) IntraMuscular once  heparin   Injectable 5000 Unit(s) SubCutaneous every 8 hours  insulin glargine Injectable (LANTUS) 20 Unit(s) SubCutaneous at bedtime  insulin lispro (ADMELOG) corrective regimen sliding scale   SubCutaneous three times a day before meals  insulin lispro Injectable (ADMELOG) 6 Unit(s) SubCutaneous three times a day before meals  linezolid    Tablet 600 milliGRAM(s) Oral every 12 hours  valsartan 80 milliGRAM(s) Oral daily    MEDICATIONS  (PRN):  acetaminophen     Tablet .. 650 milliGRAM(s) Oral every 6 hours PRN Temp greater or equal to 38C (100.4F), Mild Pain (1 - 3)  aluminum hydroxide/magnesium hydroxide/simethicone Suspension 30 milliLiter(s) Oral every 4 hours PRN Dyspepsia  dextrose Oral Gel 15 Gram(s) Oral once PRN Blood Glucose LESS THAN 70 milliGRAM(s)/deciliter  melatonin 3 milliGRAM(s) Oral at bedtime PRN Insomnia  ondansetron Injectable 4 milliGRAM(s) IV Push every 8 hours PRN Nausea and/or Vomiting

## 2023-03-04 NOTE — PROGRESS NOTE ADULT - ASSESSMENT
Patient examined and discussed with medical resident during rounds.  Chart reviewed.  F/up of this 66 yo M with hx of HTN, HLD, MI, CAD s/p PCI x 2 (2018), CHF s/p AICD (2019) admitted with infection at the AICD site, found to have an abscess.  Since hospitalization, seen by ID and CT surgery with removal of AICD on 2/27/23.  Wound c/s + Staph Lugdunesis.  Bl cs x 3 remained negative (2/25, 2/26).  Pt initially txed w/ IV Ceftriaxone and Vancomycin but now transitioned to po Zyvox yesterday.  Pt is to continue po Zyvox x total of 7 days and after that if remains stable, AICD can be re-implanted per ID.  EP f/up is  pending.  SW /  to assist with dc planning.    DM under good control.  To continue POC monitoring and insulin as ordered.    COVID- 19  +  Only mild cough reported.    Continue isolation precautions /  protocol    Hx of HTN, CAD, CHF, HLD, CKD 3 and gout - stable; meds and monitoring as ordered.

## 2023-03-05 LAB
GLUCOSE BLDC GLUCOMTR-MCNC: 107 MG/DL — HIGH (ref 70–99)
GLUCOSE BLDC GLUCOMTR-MCNC: 108 MG/DL — HIGH (ref 70–99)
GLUCOSE BLDC GLUCOMTR-MCNC: 113 MG/DL — HIGH (ref 70–99)
GLUCOSE BLDC GLUCOMTR-MCNC: 129 MG/DL — HIGH (ref 70–99)

## 2023-03-05 PROCEDURE — 99232 SBSQ HOSP IP/OBS MODERATE 35: CPT

## 2023-03-05 RX ADMIN — CHLORHEXIDINE GLUCONATE 1 APPLICATION(S): 213 SOLUTION TOPICAL at 11:56

## 2023-03-05 RX ADMIN — CARVEDILOL PHOSPHATE 12.5 MILLIGRAM(S): 80 CAPSULE, EXTENDED RELEASE ORAL at 17:27

## 2023-03-05 RX ADMIN — HEPARIN SODIUM 5000 UNIT(S): 5000 INJECTION INTRAVENOUS; SUBCUTANEOUS at 21:32

## 2023-03-05 RX ADMIN — INSULIN GLARGINE 20 UNIT(S): 100 INJECTION, SOLUTION SUBCUTANEOUS at 21:34

## 2023-03-05 RX ADMIN — ATORVASTATIN CALCIUM 80 MILLIGRAM(S): 80 TABLET, FILM COATED ORAL at 21:32

## 2023-03-05 RX ADMIN — HEPARIN SODIUM 5000 UNIT(S): 5000 INJECTION INTRAVENOUS; SUBCUTANEOUS at 05:36

## 2023-03-05 RX ADMIN — Medication 6 UNIT(S): at 16:51

## 2023-03-05 RX ADMIN — HEPARIN SODIUM 5000 UNIT(S): 5000 INJECTION INTRAVENOUS; SUBCUTANEOUS at 13:01

## 2023-03-05 RX ADMIN — Medication 6 UNIT(S): at 11:57

## 2023-03-05 RX ADMIN — LINEZOLID 600 MILLIGRAM(S): 600 INJECTION, SOLUTION INTRAVENOUS at 05:40

## 2023-03-05 RX ADMIN — Medication 6 UNIT(S): at 07:59

## 2023-03-05 RX ADMIN — LINEZOLID 600 MILLIGRAM(S): 600 INJECTION, SOLUTION INTRAVENOUS at 17:27

## 2023-03-05 RX ADMIN — Medication 100 MILLIGRAM(S): at 21:32

## 2023-03-05 RX ADMIN — Medication 81 MILLIGRAM(S): at 13:00

## 2023-03-05 RX ADMIN — Medication 100 MILLIGRAM(S): at 09:01

## 2023-03-05 RX ADMIN — VALSARTAN 80 MILLIGRAM(S): 80 TABLET ORAL at 06:34

## 2023-03-05 RX ADMIN — Medication 40 MILLIGRAM(S): at 06:34

## 2023-03-05 RX ADMIN — CHLORHEXIDINE GLUCONATE 1 APPLICATION(S): 213 SOLUTION TOPICAL at 05:36

## 2023-03-05 RX ADMIN — AMLODIPINE BESYLATE 5 MILLIGRAM(S): 2.5 TABLET ORAL at 06:33

## 2023-03-05 RX ADMIN — CHLORHEXIDINE GLUCONATE 1 APPLICATION(S): 213 SOLUTION TOPICAL at 17:36

## 2023-03-05 NOTE — PROGRESS NOTE ADULT - SUBJECTIVE AND OBJECTIVE BOX
PARUL LEAHY  67y, Male  Allergy: No Known Allergies      OVERNIGHT EVENTS:  No overnight events reported.  Still with some liquid stool, non-bloody, per pt but seems to be less than prior.    PAST MEDICAL & SURGICAL HISTORY:  Diabetes mellitus, type 2  History of heart attack s/p PCI x 2 (2018)  CHF with ICD placement (2019 - [place])  Hypertension  HLD        VITALS:  T(F): 96.7 (03-05-23 @ 13:13), Max: 98.3 (03-04-23 @ 20:08)  HR: 70 (03-05-23 @ 13:13)  BP: 101/62 (03-05-23 @ 13:13) (94/53 - 114/70)  RR: 18 (03-05-23 @ 13:13)    CONSTITUTIONAL:  no apparent distress  Alert, oriented with speech / comprehension intact  EYES:  No conjunctival or scleral injection, non-icteric  ENMT: Oral mucosa with moist membranes  No JVD  RESP: No respiratory distress, no use of accessory muscles; CTA b/l, no WRR  CV: RRR, +S1S2; no JVD; no peripheral edema  Dressings in place L chest; NT.  GI: Soft, NT, ND, no rebound, no guarding    TESTS & MEASUREMENTS:      03-03-23 @ 07:01  -  03-04-23 @ 07:00  --------------------------------------------------------  IN: 450 mL / OUT: 0 mL / NET: 450 mL    03-04-23 @ 07:01  -  03-05-23 @ 07:00  --------------------------------------------------------  IN: 80 mL / OUT: 0 mL / NET: 80 mL                            12.1   8.93  )-----------( 237      ( 04 Mar 2023 05:49 )             36.5       03-04    140  |  107  |  26<H>  ----------------------------<  100<H>  4.2   |  23  |  1.2    Ca    8.9      04 Mar 2023 05:49  Mg     1.9     03-04    TPro  5.5<L>  /  Alb  3.5  /  TBili  0.5  /  DBili  x   /  AST  22  /  ALT  27  /  AlkPhos  113  03-04    LIVER FUNCTIONS - ( 04 Mar 2023 05:49 )  Alb: 3.5 g/dL / Pro: 5.5 g/dL / ALK PHOS: 113 U/L / ALT: 27 U/L / AST: 22 U/L / GGT: x           Culture - Other (collected 02-27-23 @ 19:49)  Source: .Other None  Final Report (03-02-23 @ 15:12):    Numerous Staphylococcus lugdunensis  Organism: Staphylococcus lugdunensis (03-02-23 @ 15:12)  Organism: Staphylococcus lugdunensis (03-02-23 @ 15:12)      -  Ampicillin/Sulbactam: S <=8/4      -  Cefazolin: S <=4      -  Clindamycin: S <=0.25      -  Erythromycin: S <=0.25      -  Gentamicin: S <=1 Should not be used as monotherapy      -  Oxacillin: S 1      -  Rifampin: S <=1 Should not be used as monotherapy      -  Tetracycline: S <=1      -  Trimethoprim/Sulfamethoxazole: S <=0.5/9.5      -  Vancomycin: S 0.5      Method Type: MIRELLA    Culture - Other (collected 02-27-23 @ 19:48)  Source: .Other None  Final Report (03-02-23 @ 10:43):    No growth at 48 hours    MEDICATIONS:  MEDICATIONS  (STANDING):  allopurinol 100 milliGRAM(s) Oral two times a day  amLODIPine   Tablet 5 milliGRAM(s) Oral daily  aspirin  chewable 81 milliGRAM(s) Oral daily  atorvastatin 80 milliGRAM(s) Oral at bedtime  carvedilol 12.5 milliGRAM(s) Oral every 12 hours  chlorhexidine 2% Cloths 1 Application(s) Topical daily  chlorhexidine 2% Cloths 1 Application(s) Topical two times a day  furosemide    Tablet 40 milliGRAM(s) Oral daily  glucagon  Injectable 1 milliGRAM(s) IntraMuscular once  heparin   Injectable 5000 Unit(s) SubCutaneous every 8 hours  insulin glargine Injectable (LANTUS) 20 Unit(s) SubCutaneous at bedtime  insulin lispro (ADMELOG) corrective regimen sliding scale   SubCutaneous three times a day before meals  insulin lispro Injectable (ADMELOG) 6 Unit(s) SubCutaneous three times a day before meals  linezolid    Tablet 600 milliGRAM(s) Oral every 12 hours  valsartan 80 milliGRAM(s) Oral daily    MEDICATIONS  (PRN):  acetaminophen     Tablet .. 650 milliGRAM(s) Oral every 6 hours PRN Temp greater or equal to 38C (100.4F), Mild Pain (1 - 3)  aluminum hydroxide/magnesium hydroxide/simethicone Suspension 30 milliLiter(s) Oral every 4 hours PRN Dyspepsia  dextrose Oral Gel 15 Gram(s) Oral once PRN Blood Glucose LESS THAN 70 milliGRAM(s)/deciliter  melatonin 3 milliGRAM(s) Oral at bedtime PRN Insomnia  ondansetron Injectable 4 milliGRAM(s) IV Push every 8 hours PRN Nausea and/or Vomiting

## 2023-03-05 NOTE — PROGRESS NOTE ADULT - ASSESSMENT
F/up of this 66 yo M with hx of HTN, HLD, MI, CAD s/p PCI x 2 (2018), CHF s/p AICD (2019) admitted with infection at the AICD site, found to have an abscess.    Seen by ID and CT surgery with removal of AICD on 2/27/23.  Wound c/s + Staph Lugdunesis.  Bl cs x 3 remained negative (2/25, 2/26).  Pt initially txed w/ IV Ceftriaxone and Vancomycin but now transitioned to po Zyvox on Friday.  Pt is to continue po Zyvox x total of 7 days and after that if remains stable, AICD can be re-implanted per ID.  EP f/up is  pending.  Telemetry reviewed and pt remains in NSR.    DM under good control.    To continue POC monitoring and insulin as ordered.    COVID- 19  +  Only mild episodic cough reported.   No fever / sob noted.   Continue isolation precautions /  protocol    Hx of HTN, CAD, CHF, HLD, CKD 3 and gout - stable  Meds and monitoring as ordered.    Of note, pt's daughter is an ID physician working in [place].   SW /  to assist with dc planning.

## 2023-03-06 LAB
ALBUMIN SERPL ELPH-MCNC: 3.7 G/DL — SIGNIFICANT CHANGE UP (ref 3.5–5.2)
ALP SERPL-CCNC: 117 U/L — HIGH (ref 30–115)
ALT FLD-CCNC: 33 U/L — SIGNIFICANT CHANGE UP (ref 0–41)
ANION GAP SERPL CALC-SCNC: 11 MMOL/L — SIGNIFICANT CHANGE UP (ref 7–14)
APPEARANCE UR: CLEAR — SIGNIFICANT CHANGE UP
AST SERPL-CCNC: 26 U/L — SIGNIFICANT CHANGE UP (ref 0–41)
BASOPHILS # BLD AUTO: 0.13 K/UL — SIGNIFICANT CHANGE UP (ref 0–0.2)
BASOPHILS NFR BLD AUTO: 1.5 % — HIGH (ref 0–1)
BILIRUB SERPL-MCNC: 0.4 MG/DL — SIGNIFICANT CHANGE UP (ref 0.2–1.2)
BILIRUB UR-MCNC: NEGATIVE — SIGNIFICANT CHANGE UP
BUN SERPL-MCNC: 33 MG/DL — HIGH (ref 10–20)
CALCIUM SERPL-MCNC: 9.1 MG/DL — SIGNIFICANT CHANGE UP (ref 8.4–10.4)
CHLORIDE SERPL-SCNC: 107 MMOL/L — SIGNIFICANT CHANGE UP (ref 98–110)
CO2 SERPL-SCNC: 22 MMOL/L — SIGNIFICANT CHANGE UP (ref 17–32)
COLOR SPEC: SIGNIFICANT CHANGE UP
CREAT SERPL-MCNC: 1.4 MG/DL — SIGNIFICANT CHANGE UP (ref 0.7–1.5)
DIFF PNL FLD: NEGATIVE — SIGNIFICANT CHANGE UP
EGFR: 55 ML/MIN/1.73M2 — LOW
EOSINOPHIL # BLD AUTO: 0.67 K/UL — SIGNIFICANT CHANGE UP (ref 0–0.7)
EOSINOPHIL NFR BLD AUTO: 7.9 % — SIGNIFICANT CHANGE UP (ref 0–8)
GLUCOSE BLDC GLUCOMTR-MCNC: 118 MG/DL — HIGH (ref 70–99)
GLUCOSE BLDC GLUCOMTR-MCNC: 146 MG/DL — HIGH (ref 70–99)
GLUCOSE BLDC GLUCOMTR-MCNC: 153 MG/DL — HIGH (ref 70–99)
GLUCOSE BLDC GLUCOMTR-MCNC: 159 MG/DL — HIGH (ref 70–99)
GLUCOSE BLDC GLUCOMTR-MCNC: 61 MG/DL — LOW (ref 70–99)
GLUCOSE BLDC GLUCOMTR-MCNC: 77 MG/DL — SIGNIFICANT CHANGE UP (ref 70–99)
GLUCOSE SERPL-MCNC: 112 MG/DL — HIGH (ref 70–99)
GLUCOSE UR QL: NEGATIVE — SIGNIFICANT CHANGE UP
HCT VFR BLD CALC: 37.6 % — LOW (ref 42–52)
HGB BLD-MCNC: 12.2 G/DL — LOW (ref 14–18)
IMM GRANULOCYTES NFR BLD AUTO: 0.2 % — SIGNIFICANT CHANGE UP (ref 0.1–0.3)
KETONES UR-MCNC: NEGATIVE — SIGNIFICANT CHANGE UP
LEUKOCYTE ESTERASE UR-ACNC: NEGATIVE — SIGNIFICANT CHANGE UP
LYMPHOCYTES # BLD AUTO: 2.23 K/UL — SIGNIFICANT CHANGE UP (ref 1.2–3.4)
LYMPHOCYTES # BLD AUTO: 26.4 % — SIGNIFICANT CHANGE UP (ref 20.5–51.1)
MAGNESIUM SERPL-MCNC: 2 MG/DL — SIGNIFICANT CHANGE UP (ref 1.8–2.4)
MCHC RBC-ENTMCNC: 30.1 PG — SIGNIFICANT CHANGE UP (ref 27–31)
MCHC RBC-ENTMCNC: 32.4 G/DL — SIGNIFICANT CHANGE UP (ref 32–37)
MCV RBC AUTO: 92.8 FL — SIGNIFICANT CHANGE UP (ref 80–94)
MONOCYTES # BLD AUTO: 0.72 K/UL — HIGH (ref 0.1–0.6)
MONOCYTES NFR BLD AUTO: 8.5 % — SIGNIFICANT CHANGE UP (ref 1.7–9.3)
NEUTROPHILS # BLD AUTO: 4.67 K/UL — SIGNIFICANT CHANGE UP (ref 1.4–6.5)
NEUTROPHILS NFR BLD AUTO: 55.5 % — SIGNIFICANT CHANGE UP (ref 42.2–75.2)
NITRITE UR-MCNC: NEGATIVE — SIGNIFICANT CHANGE UP
NRBC # BLD: 0 /100 WBCS — SIGNIFICANT CHANGE UP (ref 0–0)
PH UR: 6 — SIGNIFICANT CHANGE UP (ref 5–8)
PLATELET # BLD AUTO: 269 K/UL — SIGNIFICANT CHANGE UP (ref 130–400)
POTASSIUM SERPL-MCNC: 4.5 MMOL/L — SIGNIFICANT CHANGE UP (ref 3.5–5)
POTASSIUM SERPL-SCNC: 4.5 MMOL/L — SIGNIFICANT CHANGE UP (ref 3.5–5)
PROT SERPL-MCNC: 5.7 G/DL — LOW (ref 6–8)
PROT UR-MCNC: NEGATIVE — SIGNIFICANT CHANGE UP
RBC # BLD: 4.05 M/UL — LOW (ref 4.7–6.1)
RBC # FLD: 15 % — HIGH (ref 11.5–14.5)
SODIUM SERPL-SCNC: 140 MMOL/L — SIGNIFICANT CHANGE UP (ref 135–146)
SP GR SPEC: 1.01 — SIGNIFICANT CHANGE UP (ref 1.01–1.03)
UROBILINOGEN FLD QL: SIGNIFICANT CHANGE UP
WBC # BLD: 8.44 K/UL — SIGNIFICANT CHANGE UP (ref 4.8–10.8)
WBC # FLD AUTO: 8.44 K/UL — SIGNIFICANT CHANGE UP (ref 4.8–10.8)

## 2023-03-06 PROCEDURE — 99233 SBSQ HOSP IP/OBS HIGH 50: CPT | Mod: 57

## 2023-03-06 PROCEDURE — 99232 SBSQ HOSP IP/OBS MODERATE 35: CPT

## 2023-03-06 RX ADMIN — Medication 40 MILLIGRAM(S): at 05:21

## 2023-03-06 RX ADMIN — VALSARTAN 80 MILLIGRAM(S): 80 TABLET ORAL at 05:23

## 2023-03-06 RX ADMIN — Medication 6 UNIT(S): at 16:36

## 2023-03-06 RX ADMIN — HEPARIN SODIUM 5000 UNIT(S): 5000 INJECTION INTRAVENOUS; SUBCUTANEOUS at 13:21

## 2023-03-06 RX ADMIN — CARVEDILOL PHOSPHATE 12.5 MILLIGRAM(S): 80 CAPSULE, EXTENDED RELEASE ORAL at 06:28

## 2023-03-06 RX ADMIN — Medication 81 MILLIGRAM(S): at 11:33

## 2023-03-06 RX ADMIN — LINEZOLID 600 MILLIGRAM(S): 600 INJECTION, SOLUTION INTRAVENOUS at 18:08

## 2023-03-06 RX ADMIN — AMLODIPINE BESYLATE 5 MILLIGRAM(S): 2.5 TABLET ORAL at 05:22

## 2023-03-06 RX ADMIN — CARVEDILOL PHOSPHATE 12.5 MILLIGRAM(S): 80 CAPSULE, EXTENDED RELEASE ORAL at 18:08

## 2023-03-06 RX ADMIN — INSULIN GLARGINE 20 UNIT(S): 100 INJECTION, SOLUTION SUBCUTANEOUS at 21:12

## 2023-03-06 RX ADMIN — Medication 100 MILLIGRAM(S): at 10:08

## 2023-03-06 RX ADMIN — CHLORHEXIDINE GLUCONATE 1 APPLICATION(S): 213 SOLUTION TOPICAL at 11:32

## 2023-03-06 RX ADMIN — LINEZOLID 600 MILLIGRAM(S): 600 INJECTION, SOLUTION INTRAVENOUS at 05:23

## 2023-03-06 RX ADMIN — Medication 1: at 16:36

## 2023-03-06 RX ADMIN — Medication 6 UNIT(S): at 08:19

## 2023-03-06 RX ADMIN — ATORVASTATIN CALCIUM 80 MILLIGRAM(S): 80 TABLET, FILM COATED ORAL at 21:13

## 2023-03-06 RX ADMIN — CHLORHEXIDINE GLUCONATE 1 APPLICATION(S): 213 SOLUTION TOPICAL at 18:13

## 2023-03-06 RX ADMIN — Medication 100 MILLIGRAM(S): at 21:13

## 2023-03-06 RX ADMIN — HEPARIN SODIUM 5000 UNIT(S): 5000 INJECTION INTRAVENOUS; SUBCUTANEOUS at 05:21

## 2023-03-06 NOTE — PROGRESS NOTE ADULT - SUBJECTIVE AND OBJECTIVE BOX
INTERVAL HPI/OVERNIGHT EVENTS:    MEDICATIONS  (STANDING):  allopurinol 100 milliGRAM(s) Oral two times a day  amLODIPine   Tablet 5 milliGRAM(s) Oral daily  aspirin  chewable 81 milliGRAM(s) Oral daily  atorvastatin 80 milliGRAM(s) Oral at bedtime  carvedilol 12.5 milliGRAM(s) Oral every 12 hours  chlorhexidine 2% Cloths 1 Application(s) Topical two times a day  chlorhexidine 2% Cloths 1 Application(s) Topical daily  dextrose 5%. 1000 milliLiter(s) (50 mL/Hr) IV Continuous <Continuous>  dextrose 5%. 1000 milliLiter(s) (100 mL/Hr) IV Continuous <Continuous>  dextrose 50% Injectable 25 Gram(s) IV Push once  dextrose 50% Injectable 12.5 Gram(s) IV Push once  dextrose 50% Injectable 25 Gram(s) IV Push once  furosemide    Tablet 40 milliGRAM(s) Oral daily  glucagon  Injectable 1 milliGRAM(s) IntraMuscular once  heparin   Injectable 5000 Unit(s) SubCutaneous every 8 hours  insulin glargine Injectable (LANTUS) 20 Unit(s) SubCutaneous at bedtime  insulin lispro (ADMELOG) corrective regimen sliding scale   SubCutaneous three times a day before meals  insulin lispro Injectable (ADMELOG) 6 Unit(s) SubCutaneous three times a day before meals  linezolid    Tablet 600 milliGRAM(s) Oral every 12 hours  valsartan 80 milliGRAM(s) Oral daily    MEDICATIONS  (PRN):  acetaminophen     Tablet .. 650 milliGRAM(s) Oral every 6 hours PRN Temp greater or equal to 38C (100.4F), Mild Pain (1 - 3)  aluminum hydroxide/magnesium hydroxide/simethicone Suspension 30 milliLiter(s) Oral every 4 hours PRN Dyspepsia  dextrose Oral Gel 15 Gram(s) Oral once PRN Blood Glucose LESS THAN 70 milliGRAM(s)/deciliter  melatonin 3 milliGRAM(s) Oral at bedtime PRN Insomnia  ondansetron Injectable 4 milliGRAM(s) IV Push every 8 hours PRN Nausea and/or Vomiting      Allergies    No Known Allergies    Intolerances        REVIEW OF SYSTEMS      General:	    Skin/Breast:  	  Ophthalmologic:  	  ENMT:	    Respiratory and Thorax:  	  Cardiovascular:	    Gastrointestinal:	    Genitourinary:	    Musculoskeletal:	    Neurological:	    Psychiatric:	    Hematology/Lymphatics:	    Endocrine:	    Allergic/Immunologic:	    Vital Signs Last 24 Hrs  T(C): 36.3 (06 Mar 2023 04:49), Max: 36.6 (05 Mar 2023 20:05)  T(F): 97.4 (06 Mar 2023 04:49), Max: 97.9 (05 Mar 2023 20:05)  HR: 63 (06 Mar 2023 04:49) (63 - 71)  BP: 110/68 (06 Mar 2023 06:43) (101/60 - 114/69)  BP(mean): --  RR: 18 (06 Mar 2023 04:49) (18 - 18)  SpO2: 97% (05 Mar 2023 22:10) (97% - 97%)    Parameters below as of 05 Mar 2023 22:10  Patient On (Oxygen Delivery Method): room air          Physical Exam    GENERAL: In no apparent distress, well nourished, and hydrated.  HEAD:  Atraumatic, Normocephalic  EYES: EOMI, PERRLA, conjunctiva and sclera clear  ENMT: No tonsillar erythema, exudates, or enlargements; ist mucous membranes, Good dentition, No lesions  NECK: Supple and normal thyroid.  No JVD or carotid bruit.  Carotid pulse is 2+ bilaterally.  HEART: Regular rate and rhythm; No murmurs, rubs, or gallops.  PULMONARY: Clear to auscultation and perfusion.  No rales, wheezing, or rhonchi bilaterally.  ABDOMEN: Soft, Nontender, Nondistended; Bowel sounds present  EXTREMITIES:  2+ Peripheral Pulses, No clubbing, cyanosis, or edema  LYMPH: No lymphadenopathy noted  NEUROLOGICAL: Grossly nonfocal    LABS:                        12.2   8.44  )-----------( 269      ( 06 Mar 2023 06:11 )             37.6     03-06    140  |  107  |  33<H>  ----------------------------<  112<H>  4.5   |  22  |  1.4    Ca    9.1      06 Mar 2023 06:11  Mg     2.0     03-06    TPro  5.7<L>  /  Alb  3.7  /  TBili  0.4  /  DBili  x   /  AST  26  /  ALT  33  /  AlkPhos  117<H>  03-06          RADIOLOGY & ADDITIONAL TESTS:

## 2023-03-06 NOTE — PROGRESS NOTE ADULT - SUBJECTIVE AND OBJECTIVE BOX
PARUL LEAHY  67y Male    INTERVAL HPI/OVERNIGHT EVENTS:    had 2 loose stools today - less frequent now  no pain, SOB, N/V  no fever  for AICD implant on 3/7 per EP    T(F): 97.4 (23 @ 04:49), Max: 97.9 (23 @ 20:05)  HR: 63 (23 @ 04:49) (63 - 71)  BP: 110/68 (23 @ 06:43) (101/60 - 114/69)  RR: 18 (23 @ 04:49) (18 - 18)  SpO2: 97% (23 @ 22:10) (97% - 97%) on RA    I&O's Summary    06 Mar 2023 07:01  -  06 Mar 2023 12:38  --------------------------------------------------------  IN: 0 mL / OUT: 200 mL / NET: -200 mL      Daily Weight in k (06 Mar 2023 04:49)    CAPILLARY BLOOD GLUCOSE      POCT Blood Glucose.: 77 mg/dL (06 Mar 2023 11:51)  POCT Blood Glucose.: 61 mg/dL (06 Mar 2023 11:21)  POCT Blood Glucose.: 146 mg/dL (06 Mar 2023 08:03)  POCT Blood Glucose.: 113 mg/dL (05 Mar 2023 21:29)  POCT Blood Glucose.: 108 mg/dL (05 Mar 2023 16:25)        PHYSICAL EXAM:  GENERAL: NAD  HEAD:  Normocephalic  EYES:  conjunctiva and sclera clear  ENMT: Moist mucous membranes  NECK: Supple  NERVOUS SYSTEM:  Alert, awake, Good concentration  CHEST/LUNG: CTA b/l  left chest wall with dressing  HEART: Regular rate and rhythm  ABDOMEN: Soft, Nontender, Nondistended; Bowel sounds present  EXTREMITIES: No edema  SKIN: warm, dry    Consultant(s) Notes Reviewed:  [x ] YES  [ ] NO  Care Discussed with Consultants/Other Providers [ x] YES  [ ] NO    MEDICATIONS  (STANDING):  allopurinol 100 milliGRAM(s) Oral two times a day  amLODIPine   Tablet 5 milliGRAM(s) Oral daily  aspirin  chewable 81 milliGRAM(s) Oral daily  atorvastatin 80 milliGRAM(s) Oral at bedtime  carvedilol 12.5 milliGRAM(s) Oral every 12 hours  chlorhexidine 2% Cloths 1 Application(s) Topical two times a day  chlorhexidine 2% Cloths 1 Application(s) Topical daily  dextrose 5%. 1000 milliLiter(s) (50 mL/Hr) IV Continuous <Continuous>  dextrose 5%. 1000 milliLiter(s) (100 mL/Hr) IV Continuous <Continuous>  dextrose 50% Injectable 25 Gram(s) IV Push once  dextrose 50% Injectable 12.5 Gram(s) IV Push once  dextrose 50% Injectable 25 Gram(s) IV Push once  furosemide    Tablet 40 milliGRAM(s) Oral daily  glucagon  Injectable 1 milliGRAM(s) IntraMuscular once  heparin   Injectable 5000 Unit(s) SubCutaneous every 8 hours  insulin glargine Injectable (LANTUS) 20 Unit(s) SubCutaneous at bedtime  insulin lispro (ADMELOG) corrective regimen sliding scale   SubCutaneous three times a day before meals  insulin lispro Injectable (ADMELOG) 6 Unit(s) SubCutaneous three times a day before meals  linezolid    Tablet 600 milliGRAM(s) Oral every 12 hours  valsartan 80 milliGRAM(s) Oral daily    MEDICATIONS  (PRN):  acetaminophen     Tablet .. 650 milliGRAM(s) Oral every 6 hours PRN Temp greater or equal to 38C (100.4F), Mild Pain (1 - 3)  aluminum hydroxide/magnesium hydroxide/simethicone Suspension 30 milliLiter(s) Oral every 4 hours PRN Dyspepsia  dextrose Oral Gel 15 Gram(s) Oral once PRN Blood Glucose LESS THAN 70 milliGRAM(s)/deciliter  melatonin 3 milliGRAM(s) Oral at bedtime PRN Insomnia  ondansetron Injectable 4 milliGRAM(s) IV Push every 8 hours PRN Nausea and/or Vomiting      Telemetry reviewed by me    LABS:                        12.2   8.44  )-----------( 269      ( 06 Mar 2023 06:11 )             37.6     03-06    140  |  107  |  33<H>  ----------------------------<  112<H>  4.5   |  22  |  1.4    Ca    9.1      06 Mar 2023 06:11  Mg     2.0     03-06    TPro  5.7<L>  /  Alb  3.7  /  TBili  0.4  /  DBili  x   /  AST  26  /  ALT  33  /  AlkPhos  117<H>  03-06                Case discussed with residents and RN on rounds today    Care discussed with pt

## 2023-03-06 NOTE — PROGRESS NOTE ADULT - ASSESSMENT
66 y/o man with PMH of CAD, s/p PCI x 2 in 2018, MI, CHF, s/p [employer] AICD that was placed in [place] in 2019, DM, HTN and hyperlipidemia Presents for redness, swelling and discomfort over AICD site. Cardiology: Dr. Rock    1. Abscess of AICD pocket   s/p removal of AICD on 2/27 by CT surgery - wound care per surgery  wound culture: Staph lugdunensis - sensitivities reviewed  ID f/u appreciated: po Linezolid 600 mg q12h and can proceed with reimplant from ID standpoint with linezolid to be continued 7 days post implant  EP f/u appreciated - for reimplant on 3/7 - NPO after midnight  pathology: infected sebaceous cyst  blood cultures negative x 3  pain control  continue telemetry    2. DM type 2 on Trulicity at home  on lantus and lispro inpt and monitor FS  A1C 7.1    3. 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 CAD s/p PCI x 2, MI  on ASA/statin/coreg/valsartan  on Brilinta at home - held for now    5. HTN - continue current management - BP acceptable    6. Gout  - c/w home allopurinol     7. CKD 3 - stable    8. COVID positive  pt had mild cough, otherwise no symptoms  no further fever which may have been due to the abscess  CT scan of chest: no GGO  stable on RA  isolation per protocol - 10 days    9. DVT prophylaxis     10. Diarrhea - better now per pt and likely due to abx - no fever or leukocytosis    full code  guarded prognosis  high risk pt      PROGRESS NOTE HANDOFF    Pending: AICD implant on 3/7, continue telemetry    pt aware of the plan of care    Disposition: Home

## 2023-03-06 NOTE — PROGRESS NOTE ADULT - ASSESSMENT
67-year-old male with CAD, s/p PCI x 2 2018, CHF, diabetes, hypertension, high cholesterol, HFpEF 30-35%, MI s/p AICD ([employer] [employer] that was placed in [place] in 2019)   CT chest with IV contrast : Collection adjacent to ICD within the left chest subcutaneous tissues measuring 2.6 x 2.3 x 1.7 cm.    IMPRESSION;  S/p debridement for left AICD pocket space abscess which has resolved with local cultures with S irma  2/27 S/p explant  No sepsis  2/25,26 BCx NGTD    RECOMMENDATIONS;  Continue po Linezolid 600 mg q12h   Could proceed with reimplant from ID standpoint with linezolid to be continued 7 days post implant  Needs ID clearance for positive COVID   NPO @ midnight for right sided AICD reimplantation on 3/7 with Dr. Alex   Hold [facility] and in the AM   Please order UA  67-year-old male with CAD, s/p PCI x 2 2018, diabetes, hypertension, high cholesterol, HFpEF 30-35%, MI s/p AICD ([employer] that was placed in [place] in 2019)   CT chest with IV contrast : Collection adjacent to ICD within the left chest subcutaneous tissues measuring 2.6 x 2.3 x 1.7 cm.    IMPRESSION;  S/p debridement for left AICD pocket space abscess which has resolved with local cultures with S josensis  2/27 S/p explant  No sepsis  2/25,26 BCx NGTD    RECOMMENDATIONS;  Continue po Linezolid 600 mg q12h   Could proceed with reimplant from ID standpoint with linezolid to be continued 7 days post implant  Needs ID clearance for positive COVID   NPO @ midnight for right sided AICD reimplantation on 3/7 with Dr. Alex   Hold [facility] and in the AM   Please order UA  67-year-old male with CAD, s/p PCI x 2 2018, diabetes, hypertension, high cholesterol, HFpEF 30-35%, MI s/p AICD ([place] [employer] that was placed in [place] in 2019)   CT chest with IV contrast : Collection adjacent to ICD within the left chest subcutaneous tissues measuring 2.6 x 2.3 x 1.7 cm.    IMPRESSION;  S/p debridement for left AICD pocket space abscess which has resolved with local cultures with S cristinaunensis  2/27 S/p explant  No sepsis  2/25,26 BCx NGTD    RECOMMENDATIONS;  Continue po Linezolid 600 mg q12h   Could proceed with reimplant from ID standpoint with linezolid to be continued 7 days post implant  Needs ID clearance for positive COVID   NPO @ midnight for right sided AICD reimplantation on 3/7 with Dr. Alex   Hold [facility] and in the AM   Please order    [phone]

## 2023-03-07 LAB
ALBUMIN SERPL ELPH-MCNC: 3.5 G/DL — SIGNIFICANT CHANGE UP (ref 3.5–5.2)
ALP SERPL-CCNC: 115 U/L — SIGNIFICANT CHANGE UP (ref 30–115)
ALT FLD-CCNC: 34 U/L — SIGNIFICANT CHANGE UP (ref 0–41)
ANION GAP SERPL CALC-SCNC: 11 MMOL/L — SIGNIFICANT CHANGE UP (ref 7–14)
AST SERPL-CCNC: 24 U/L — SIGNIFICANT CHANGE UP (ref 0–41)
BASOPHILS # BLD AUTO: 0.14 K/UL — SIGNIFICANT CHANGE UP (ref 0–0.2)
BASOPHILS NFR BLD AUTO: 1.8 % — HIGH (ref 0–1)
BILIRUB SERPL-MCNC: 0.5 MG/DL — SIGNIFICANT CHANGE UP (ref 0.2–1.2)
BUN SERPL-MCNC: 34 MG/DL — HIGH (ref 10–20)
CALCIUM SERPL-MCNC: 9 MG/DL — SIGNIFICANT CHANGE UP (ref 8.4–10.4)
CHLORIDE SERPL-SCNC: 105 MMOL/L — SIGNIFICANT CHANGE UP (ref 98–110)
CO2 SERPL-SCNC: 23 MMOL/L — SIGNIFICANT CHANGE UP (ref 17–32)
CREAT SERPL-MCNC: 1.5 MG/DL — SIGNIFICANT CHANGE UP (ref 0.7–1.5)
EGFR: 51 ML/MIN/1.73M2 — LOW
EOSINOPHIL # BLD AUTO: 0.62 K/UL — SIGNIFICANT CHANGE UP (ref 0–0.7)
EOSINOPHIL NFR BLD AUTO: 7.8 % — SIGNIFICANT CHANGE UP (ref 0–8)
GLUCOSE BLDC GLUCOMTR-MCNC: 105 MG/DL — HIGH (ref 70–99)
GLUCOSE BLDC GLUCOMTR-MCNC: 114 MG/DL — HIGH (ref 70–99)
GLUCOSE BLDC GLUCOMTR-MCNC: 121 MG/DL — HIGH (ref 70–99)
GLUCOSE BLDC GLUCOMTR-MCNC: 230 MG/DL — HIGH (ref 70–99)
GLUCOSE SERPL-MCNC: 100 MG/DL — HIGH (ref 70–99)
HCT VFR BLD CALC: 35.9 % — LOW (ref 42–52)
HGB BLD-MCNC: 11.7 G/DL — LOW (ref 14–18)
IMM GRANULOCYTES NFR BLD AUTO: 0.3 % — SIGNIFICANT CHANGE UP (ref 0.1–0.3)
LYMPHOCYTES # BLD AUTO: 2.28 K/UL — SIGNIFICANT CHANGE UP (ref 1.2–3.4)
LYMPHOCYTES # BLD AUTO: 28.5 % — SIGNIFICANT CHANGE UP (ref 20.5–51.1)
MAGNESIUM SERPL-MCNC: 2 MG/DL — SIGNIFICANT CHANGE UP (ref 1.8–2.4)
MCHC RBC-ENTMCNC: 30 PG — SIGNIFICANT CHANGE UP (ref 27–31)
MCHC RBC-ENTMCNC: 32.6 G/DL — SIGNIFICANT CHANGE UP (ref 32–37)
MCV RBC AUTO: 92.1 FL — SIGNIFICANT CHANGE UP (ref 80–94)
MONOCYTES # BLD AUTO: 0.64 K/UL — HIGH (ref 0.1–0.6)
MONOCYTES NFR BLD AUTO: 8 % — SIGNIFICANT CHANGE UP (ref 1.7–9.3)
NEUTROPHILS # BLD AUTO: 4.29 K/UL — SIGNIFICANT CHANGE UP (ref 1.4–6.5)
NEUTROPHILS NFR BLD AUTO: 53.6 % — SIGNIFICANT CHANGE UP (ref 42.2–75.2)
NRBC # BLD: 0 /100 WBCS — SIGNIFICANT CHANGE UP (ref 0–0)
PLATELET # BLD AUTO: 278 K/UL — SIGNIFICANT CHANGE UP (ref 130–400)
POTASSIUM SERPL-MCNC: 4.6 MMOL/L — SIGNIFICANT CHANGE UP (ref 3.5–5)
POTASSIUM SERPL-SCNC: 4.6 MMOL/L — SIGNIFICANT CHANGE UP (ref 3.5–5)
PROT SERPL-MCNC: 5.6 G/DL — LOW (ref 6–8)
RBC # BLD: 3.9 M/UL — LOW (ref 4.7–6.1)
RBC # FLD: 15.3 % — HIGH (ref 11.5–14.5)
SODIUM SERPL-SCNC: 139 MMOL/L — SIGNIFICANT CHANGE UP (ref 135–146)
WBC # BLD: 7.99 K/UL — SIGNIFICANT CHANGE UP (ref 4.8–10.8)
WBC # FLD AUTO: 7.99 K/UL — SIGNIFICANT CHANGE UP (ref 4.8–10.8)

## 2023-03-07 PROCEDURE — 71045 X-RAY EXAM CHEST 1 VIEW: CPT | Mod: 26

## 2023-03-07 PROCEDURE — 33249 INSJ/RPLCMT DEFIB W/LEAD(S): CPT

## 2023-03-07 PROCEDURE — 99232 SBSQ HOSP IP/OBS MODERATE 35: CPT

## 2023-03-07 RX ORDER — VANCOMYCIN HCL 1 G
1000 VIAL (EA) INTRAVENOUS ONCE
Refills: 0 | Status: COMPLETED | OUTPATIENT
Start: 2023-03-07 | End: 2023-03-07

## 2023-03-07 RX ORDER — VANCOMYCIN HCL 1 G
1000 VIAL (EA) INTRAVENOUS ONCE
Refills: 0 | Status: COMPLETED | OUTPATIENT
Start: 2023-03-08 | End: 2023-03-08

## 2023-03-07 RX ADMIN — CARVEDILOL PHOSPHATE 12.5 MILLIGRAM(S): 80 CAPSULE, EXTENDED RELEASE ORAL at 06:00

## 2023-03-07 RX ADMIN — Medication 100 MILLIGRAM(S): at 12:25

## 2023-03-07 RX ADMIN — INSULIN GLARGINE 20 UNIT(S): 100 INJECTION, SOLUTION SUBCUTANEOUS at 22:34

## 2023-03-07 RX ADMIN — Medication 250 MILLIGRAM(S): at 16:04

## 2023-03-07 RX ADMIN — LINEZOLID 600 MILLIGRAM(S): 600 INJECTION, SOLUTION INTRAVENOUS at 06:00

## 2023-03-07 RX ADMIN — Medication 81 MILLIGRAM(S): at 12:25

## 2023-03-07 RX ADMIN — LINEZOLID 600 MILLIGRAM(S): 600 INJECTION, SOLUTION INTRAVENOUS at 17:44

## 2023-03-07 RX ADMIN — CARVEDILOL PHOSPHATE 12.5 MILLIGRAM(S): 80 CAPSULE, EXTENDED RELEASE ORAL at 17:44

## 2023-03-07 RX ADMIN — AMLODIPINE BESYLATE 5 MILLIGRAM(S): 2.5 TABLET ORAL at 06:00

## 2023-03-07 RX ADMIN — CHLORHEXIDINE GLUCONATE 1 APPLICATION(S): 213 SOLUTION TOPICAL at 12:27

## 2023-03-07 RX ADMIN — VALSARTAN 80 MILLIGRAM(S): 80 TABLET ORAL at 06:00

## 2023-03-07 RX ADMIN — Medication 250 MILLIGRAM(S): at 15:10

## 2023-03-07 RX ADMIN — Medication 100 MILLIGRAM(S): at 22:34

## 2023-03-07 RX ADMIN — CHLORHEXIDINE GLUCONATE 1 APPLICATION(S): 213 SOLUTION TOPICAL at 06:01

## 2023-03-07 RX ADMIN — ATORVASTATIN CALCIUM 80 MILLIGRAM(S): 80 TABLET, FILM COATED ORAL at 22:35

## 2023-03-07 RX ADMIN — Medication 40 MILLIGRAM(S): at 06:00

## 2023-03-07 NOTE — CHART NOTE - NSCHARTNOTEFT_GEN_A_CORE
PACU ANESTHESIA ADMISSION NOTE      Procedure: AICD implantation      Post op diagnosis: heart failure    __x__  Patent Airway    _xx___  Full return of protective reflexes    ____  Full recovery from anesthesia / back to baseline status    Vitals:  T(C): 97 F  HR: 70  BP: 111/58  RR: 12  SpO2: 96%    Mental Status:  __x__ Awake   __x___ Alert   _____ Drowsy   _____ Sedated    Nausea/Vomiting:  _x___ NO  ______Yes,   See Post - Op Orders          Pain Scale (0-10):  _____    Treatment: ____ None    _x___ See Post - Op/PCA Orders    Post - Operative Fluids:   ____ Oral   _x___ See Post - Op Orders    Plan: Discharge:   ____Home       _x____Floor     _____Critical Care    _____  Other:_________________    Comments: uneventful anesthesia course no complications. Vitals stable. Patient recovered in procedure room by procedure RNs. Transfer to floor when criteria is met

## 2023-03-07 NOTE — PROGRESS NOTE ADULT - SUBJECTIVE AND OBJECTIVE BOX
PARUL LEAHY  67y Male    INTERVAL HPI/OVERNIGHT EVENTS:    no new complaints  cough improved  no fever  awaiting AICD reimplant    T(F): 96.3 (23 @ 05:34), Max: 98.3 (23 @ 22:34)  HR: 61 (23 @ 05:34) (61 - 66)  BP: 110/65 (23 @ 05:34) (93/58 - 110/65)  RR: 18 (23 @ 05:34) (18 - 18)  SpO2: --    I&O's Summary    06 Mar 2023 07:01  -  07 Mar 2023 07:00  --------------------------------------------------------  IN: 0 mL / OUT: 200 mL / NET: -200 mL        Daily     Daily Weight in k (07 Mar 2023 05:34)    CAPILLARY BLOOD GLUCOSE      POCT Blood Glucose.: 121 mg/dL (07 Mar 2023 11:46)  POCT Blood Glucose.: 114 mg/dL (07 Mar 2023 07:36)  POCT Blood Glucose.: 118 mg/dL (06 Mar 2023 20:57)  POCT Blood Glucose.: 159 mg/dL (06 Mar 2023 16:28)  POCT Blood Glucose.: 153 mg/dL (06 Mar 2023 12:46)        PHYSICAL EXAM:  GENERAL: NAD  HEAD:  Normocephalic  EYES:  conjunctiva and sclera clear  ENMT: Moist mucous membranes  NECK: Supple  NERVOUS SYSTEM:  Alert, awake, Good concentration  CHEST/LUNG: CTA b/l  left chest wall dressing  HEART: Regular rate and rhythm  ABDOMEN: Soft, Nontender, Nondistended; Bowel sounds present  EXTREMITIES: No edema  SKIN: warm, dry    Consultant(s) Notes Reviewed:  [x ] YES  [ ] NO  Care Discussed with Consultants/Other Providers [ x] YES  [ ] NO    MEDICATIONS  (STANDING):  allopurinol 100 milliGRAM(s) Oral two times a day  amLODIPine   Tablet 5 milliGRAM(s) Oral daily  aspirin  chewable 81 milliGRAM(s) Oral daily  atorvastatin 80 milliGRAM(s) Oral at bedtime  carvedilol 12.5 milliGRAM(s) Oral every 12 hours  chlorhexidine 2% Cloths 1 Application(s) Topical daily  chlorhexidine 2% Cloths 1 Application(s) Topical two times a day  dextrose 5%. 1000 milliLiter(s) (50 mL/Hr) IV Continuous <Continuous>  dextrose 5%. 1000 milliLiter(s) (100 mL/Hr) IV Continuous <Continuous>  dextrose 50% Injectable 25 Gram(s) IV Push once  dextrose 50% Injectable 12.5 Gram(s) IV Push once  dextrose 50% Injectable 25 Gram(s) IV Push once  furosemide    Tablet 40 milliGRAM(s) Oral daily  glucagon  Injectable 1 milliGRAM(s) IntraMuscular once  insulin glargine Injectable (LANTUS) 20 Unit(s) SubCutaneous at bedtime  insulin lispro (ADMELOG) corrective regimen sliding scale   SubCutaneous three times a day before meals  insulin lispro Injectable (ADMELOG) 6 Unit(s) SubCutaneous three times a day before meals  linezolid    Tablet 600 milliGRAM(s) Oral every 12 hours  valsartan 80 milliGRAM(s) Oral daily    MEDICATIONS  (PRN):  acetaminophen     Tablet .. 650 milliGRAM(s) Oral every 6 hours PRN Temp greater or equal to 38C (100.4F), Mild Pain (1 - 3)  aluminum hydroxide/magnesium hydroxide/simethicone Suspension 30 milliLiter(s) Oral every 4 hours PRN Dyspepsia  dextrose Oral Gel 15 Gram(s) Oral once PRN Blood Glucose LESS THAN 70 milliGRAM(s)/deciliter  melatonin 3 milliGRAM(s) Oral at bedtime PRN Insomnia  ondansetron Injectable 4 milliGRAM(s) IV Push every 8 hours PRN Nausea and/or Vomiting      Telemetry reviewed by me    LABS:                        11.7   7.99  )-----------( 278      ( 07 Mar 2023 06:54 )             35.9     03-07    139  |  105  |  34<H>  ----------------------------<  100<H>  4.6   |  23  |  1.5    Ca    9.0      07 Mar 2023 06:54  Mg     2.0     03-07    TPro  5.6<L>  /  Alb  3.5  /  TBili  0.5  /  DBili  x   /  AST  24  /  ALT  34  /  AlkPhos  115  03-07                Case discussed with residents and RN on rounds today    Care discussed with pt

## 2023-03-07 NOTE — PROGRESS NOTE ADULT - ASSESSMENT
68 y/o man with PMH of CAD, s/p PCI x 2 in 2018, MI, CHF, s/p [place] [employer] AICD that was placed in [place] in 2019, DM, HTN and hyperlipidemia Presents for redness, swelling and discomfort over AICD site. Cardiology: Dr. Rock    1. Abscess of AICD pocket   s/p removal of AICD on 2/27 by CT surgery - wound care per surgery  wound culture: Staph lugdunensis - sensitivities reviewed  ID f/u appreciated: po Linezolid 600 mg q12h and can proceed with reimplant from ID standpoint with linezolid to be continued 7 days post implant  EP f/u appreciated - for reimplant on 3/7 - NPO after midnight  pathology: infected sebaceous cyst  blood cultures negative x 3  pain control  continue telemetry    2. DM type 2 on Trulicity at home  on lantus and lispro inpt and monitor FS  A1C 7.1    3. 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 CAD s/p PCI x 2, MI  on ASA/statin/coreg/valsartan  on Brilinta at home - held for now    5. HTN - continue current management - BP acceptable    6. Gout  - c/w home allopurinol     7. CKD 3 - stable    8. COVID positive  pt had mild cough, otherwise no symptoms  no further fever which may have been due to the abscess  CT scan of chest: no GGO  stable on RA  isolation per protocol - 10 days    9. DVT prophylaxis     10. Diarrhea - better now per pt and likely due to abx - no fever or leukocytosis  no incontinence      full code  high risk pt      PROGRESS NOTE HANDOFF    Pending: AICD implant today, continue telemetry monitoring    pt aware of the plan of care    Disposition: Home

## 2023-03-08 ENCOUNTER — TRANSCRIPTION ENCOUNTER (OUTPATIENT)
Age: 68
End: 2023-03-08

## 2023-03-08 VITALS
DIASTOLIC BLOOD PRESSURE: 58 MMHG | HEART RATE: 73 BPM | RESPIRATION RATE: 18 BRPM | SYSTOLIC BLOOD PRESSURE: 98 MMHG | TEMPERATURE: 97 F

## 2023-03-08 LAB
ALBUMIN SERPL ELPH-MCNC: 3.9 G/DL — SIGNIFICANT CHANGE UP (ref 3.5–5.2)
ALP SERPL-CCNC: 121 U/L — HIGH (ref 30–115)
ALT FLD-CCNC: 34 U/L — SIGNIFICANT CHANGE UP (ref 0–41)
ANION GAP SERPL CALC-SCNC: 10 MMOL/L — SIGNIFICANT CHANGE UP (ref 7–14)
AST SERPL-CCNC: 27 U/L — SIGNIFICANT CHANGE UP (ref 0–41)
BASOPHILS # BLD AUTO: 0.08 K/UL — SIGNIFICANT CHANGE UP (ref 0–0.2)
BASOPHILS NFR BLD AUTO: 0.9 % — SIGNIFICANT CHANGE UP (ref 0–1)
BILIRUB SERPL-MCNC: 0.8 MG/DL — SIGNIFICANT CHANGE UP (ref 0.2–1.2)
BUN SERPL-MCNC: 30 MG/DL — HIGH (ref 10–20)
CALCIUM SERPL-MCNC: 9.1 MG/DL — SIGNIFICANT CHANGE UP (ref 8.4–10.5)
CHLORIDE SERPL-SCNC: 100 MMOL/L — SIGNIFICANT CHANGE UP (ref 98–110)
CO2 SERPL-SCNC: 27 MMOL/L — SIGNIFICANT CHANGE UP (ref 17–32)
CREAT SERPL-MCNC: 1.6 MG/DL — HIGH (ref 0.7–1.5)
EGFR: 47 ML/MIN/1.73M2 — LOW
EOSINOPHIL # BLD AUTO: 0.4 K/UL — SIGNIFICANT CHANGE UP (ref 0–0.7)
EOSINOPHIL NFR BLD AUTO: 4.4 % — SIGNIFICANT CHANGE UP (ref 0–8)
GLUCOSE BLDC GLUCOMTR-MCNC: 104 MG/DL — HIGH (ref 70–99)
GLUCOSE BLDC GLUCOMTR-MCNC: 163 MG/DL — HIGH (ref 70–99)
GLUCOSE SERPL-MCNC: 107 MG/DL — HIGH (ref 70–99)
HCT VFR BLD CALC: 39.8 % — LOW (ref 42–52)
HGB BLD-MCNC: 13 G/DL — LOW (ref 14–18)
IMM GRANULOCYTES NFR BLD AUTO: 0.2 % — SIGNIFICANT CHANGE UP (ref 0.1–0.3)
LYMPHOCYTES # BLD AUTO: 1.53 K/UL — SIGNIFICANT CHANGE UP (ref 1.2–3.4)
LYMPHOCYTES # BLD AUTO: 17 % — LOW (ref 20.5–51.1)
MAGNESIUM SERPL-MCNC: 2.1 MG/DL — SIGNIFICANT CHANGE UP (ref 1.8–2.4)
MCHC RBC-ENTMCNC: 30 PG — SIGNIFICANT CHANGE UP (ref 27–31)
MCHC RBC-ENTMCNC: 32.7 G/DL — SIGNIFICANT CHANGE UP (ref 32–37)
MCV RBC AUTO: 91.7 FL — SIGNIFICANT CHANGE UP (ref 80–94)
MONOCYTES # BLD AUTO: 0.92 K/UL — HIGH (ref 0.1–0.6)
MONOCYTES NFR BLD AUTO: 10.2 % — HIGH (ref 1.7–9.3)
NEUTROPHILS # BLD AUTO: 6.05 K/UL — SIGNIFICANT CHANGE UP (ref 1.4–6.5)
NEUTROPHILS NFR BLD AUTO: 67.3 % — SIGNIFICANT CHANGE UP (ref 42.2–75.2)
NRBC # BLD: 0 /100 WBCS — SIGNIFICANT CHANGE UP (ref 0–0)
PLATELET # BLD AUTO: 272 K/UL — SIGNIFICANT CHANGE UP (ref 130–400)
POTASSIUM SERPL-MCNC: 4.4 MMOL/L — SIGNIFICANT CHANGE UP (ref 3.5–5)
POTASSIUM SERPL-SCNC: 4.4 MMOL/L — SIGNIFICANT CHANGE UP (ref 3.5–5)
PROT SERPL-MCNC: 6.2 G/DL — SIGNIFICANT CHANGE UP (ref 6–8)
RBC # BLD: 4.34 M/UL — LOW (ref 4.7–6.1)
RBC # FLD: 15 % — HIGH (ref 11.5–14.5)
SODIUM SERPL-SCNC: 137 MMOL/L — SIGNIFICANT CHANGE UP (ref 135–146)
WBC # BLD: 9 K/UL — SIGNIFICANT CHANGE UP (ref 4.8–10.8)
WBC # FLD AUTO: 9 K/UL — SIGNIFICANT CHANGE UP (ref 4.8–10.8)

## 2023-03-08 PROCEDURE — 93010 ELECTROCARDIOGRAM REPORT: CPT

## 2023-03-08 PROCEDURE — 99239 HOSP IP/OBS DSCHRG MGMT >30: CPT

## 2023-03-08 PROCEDURE — 71046 X-RAY EXAM CHEST 2 VIEWS: CPT | Mod: 26

## 2023-03-08 RX ORDER — LINEZOLID 600 MG/300ML
1 INJECTION, SOLUTION INTRAVENOUS
Qty: 6 | Refills: 0
Start: 2023-03-08 | End: 2023-03-10

## 2023-03-08 RX ADMIN — Medication 6 UNIT(S): at 12:51

## 2023-03-08 RX ADMIN — Medication 6 UNIT(S): at 08:12

## 2023-03-08 RX ADMIN — Medication 100 MILLIGRAM(S): at 12:51

## 2023-03-08 RX ADMIN — CHLORHEXIDINE GLUCONATE 1 APPLICATION(S): 213 SOLUTION TOPICAL at 05:38

## 2023-03-08 RX ADMIN — Medication 81 MILLIGRAM(S): at 12:51

## 2023-03-08 RX ADMIN — Medication 250 MILLIGRAM(S): at 01:03

## 2023-03-08 RX ADMIN — VALSARTAN 80 MILLIGRAM(S): 80 TABLET ORAL at 06:23

## 2023-03-08 RX ADMIN — LINEZOLID 600 MILLIGRAM(S): 600 INJECTION, SOLUTION INTRAVENOUS at 05:39

## 2023-03-08 RX ADMIN — AMLODIPINE BESYLATE 5 MILLIGRAM(S): 2.5 TABLET ORAL at 08:13

## 2023-03-08 RX ADMIN — Medication 40 MILLIGRAM(S): at 08:13

## 2023-03-08 RX ADMIN — CARVEDILOL PHOSPHATE 12.5 MILLIGRAM(S): 80 CAPSULE, EXTENDED RELEASE ORAL at 08:13

## 2023-03-08 RX ADMIN — Medication 1: at 12:52

## 2023-03-08 NOTE — DISCHARGE NOTE PROVIDER - CARE PROVIDER_API CALL
Kingsley Alex; NEREIDA)  [place] [address]  Phone: [phone]  Fax: [phone]  Follow Up Time: 1 month

## 2023-03-08 NOTE — MEDICAL STUDENT PROGRESS NOTE(EDUCATION) - NS MD HP STUD ASPLAN ASSES FT
66 yo M pmhx of CAD s/p PCI x 2, CHF, Diabetes, Hypertension, high cholesterol, gout, presenting s/p ICD removal secondary to chest wall abscess
66 yo M pmhx of CAD s/p PCI x 2, CHF, Diabetes, Hypertension, high cholesterol, gout, presenting s/p ICD removal secondary to chest wall abscess
68 yo M pmhx of CAD s/p PCI x 2, CHF, Diabetes, Hypertension, high cholesterol, gout, presenting s/p ICD removal secondary to chest wall abscess
68 yo M pmhx of CAD s/p PCI x 2, CHF, Diabetes, Hypertension, high cholesterol, gout, presenting s/p ICD removal secondary to chest wall abscess
68 yo M pmhx of CAD s/p PCI x 2, CHF, Diabetes, Hypertnsion, high cholesterol, gout, presenting s/p ICD removal secondary to chest wall abcess
66 yo M pmhx of CAD s/p PCI x 2, CHF, Diabetes, Hypertension, high cholesterol, gout, presenting s/p ICD removal secondary to chest wall abscess
68 yo M pmhx of CAD s/p PCI x 2, CHF, Diabetes, Hypertension, high cholesterol, gout, presenting s/p ICD removal secondary to chest wall abscess

## 2023-03-08 NOTE — DISCHARGE NOTE PROVIDER - HOSPITAL COURSE
Hx of CAD, s/p PCI x 2 2018, CHF, diabetes, hypertension, high cholesterol, CHF,[employer] aicd that was placed in [place] in 2019 post MI    Presents to the ED complaining of left chest wall abscess over defibrillator site.    Reports that he has had an inc in swelling around pocket of device for the past year however for the past week there has been an inc in redness  around the incision. He also reports chills within the last 24 hours and tenderness at the site. He denies ever having a problem with the device in the past and gets it checked remotely with his cardiologist Dr Rock in [place].    In the ED :  BP: Not recorded   HR : 80  RR : 18  T : 97  O2: 98% on RA     WBC : 10.45  Cr: 1.3 (unknown baseline)  CT chest with IV contrast : Collection adjacent to ICD within the left chest subcutaneous tissues measuring 2.6 x 2.3 x 1.7 cm.  ECG : Sinus with first degree block     s/p Vanco 1g IV x1    CT Surgery saw patient : pt may need to have device explanted and pocket washed out -- pending final plan by attending   EP consult placed for device interrogation     Pt is s/p removal of left chest wall AICD and was subsequently started on Abx. Blood cultures were negative but wound cultures grew S lugdunensis. Pt was cleared by ID for replacement of AICD.     Patient is s/p ICD implant right side. CXR shows leads in good position. Device interrogation done.  No events.  Device is functioning properly    Pt is medically cleared for discharge.

## 2023-03-08 NOTE — PROGRESS NOTE ADULT - SUBJECTIVE AND OBJECTIVE BOX
INTERVAL HPI/OVERNIGHT EVENTS:    Patient s/p ICD reimplant on right side ([employer])  No events over night. Pt without complaints    MEDICATIONS  (STANDING):  allopurinol 100 milliGRAM(s) Oral two times a day  amLODIPine   Tablet 5 milliGRAM(s) Oral daily  aspirin  chewable 81 milliGRAM(s) Oral daily  atorvastatin 80 milliGRAM(s) Oral at bedtime  carvedilol 12.5 milliGRAM(s) Oral every 12 hours  chlorhexidine 2% Cloths 1 Application(s) Topical daily  chlorhexidine 2% Cloths 1 Application(s) Topical two times a day  dextrose 5%. 1000 milliLiter(s) (50 mL/Hr) IV Continuous <Continuous>  dextrose 5%. 1000 milliLiter(s) (100 mL/Hr) IV Continuous <Continuous>  dextrose 50% Injectable 25 Gram(s) IV Push once  dextrose 50% Injectable 12.5 Gram(s) IV Push once  dextrose 50% Injectable 25 Gram(s) IV Push once  furosemide    Tablet 40 milliGRAM(s) Oral daily  glucagon  Injectable 1 milliGRAM(s) IntraMuscular once  insulin glargine Injectable (LANTUS) 20 Unit(s) SubCutaneous at bedtime  insulin lispro (ADMELOG) corrective regimen sliding scale   SubCutaneous three times a day before meals  insulin lispro Injectable (ADMELOG) 6 Unit(s) SubCutaneous three times a day before meals  linezolid    Tablet 600 milliGRAM(s) Oral every 12 hours  valsartan 80 milliGRAM(s) Oral daily    MEDICATIONS  (PRN):  acetaminophen     Tablet .. 650 milliGRAM(s) Oral every 6 hours PRN Temp greater or equal to 38C (100.4F), Mild Pain (1 - 3)  aluminum hydroxide/magnesium hydroxide/simethicone Suspension 30 milliLiter(s) Oral every 4 hours PRN Dyspepsia  dextrose Oral Gel 15 Gram(s) Oral once PRN Blood Glucose LESS THAN 70 milliGRAM(s)/deciliter  melatonin 3 milliGRAM(s) Oral at bedtime PRN Insomnia  ondansetron Injectable 4 milliGRAM(s) IV Push every 8 hours PRN Nausea and/or Vomiting    Allergies    No Known Allergies    Intolerances      Vital Signs Last 24 Hrs  T(C): 35.1 (08 Mar 2023 06:48), Max: 36.2 (07 Mar 2023 19:45)  T(F): 95.1 (08 Mar 2023 06:48), Max: 97.2 (07 Mar 2023 19:45)  HR: 74 (08 Mar 2023 06:48) (65 - 81)  BP: 99/57 (08 Mar 2023 06:48) (98/63 - 108/67)  BP(mean): 83 (07 Mar 2023 14:45) (83 - 83)  RR: 18 (08 Mar 2023 06:48) (18 - 18)  SpO2: 96% (08 Mar 2023 06:48) (96% - 97%)    Parameters below as of 07 Mar 2023 14:45    O2 Flow (L/min): 3      Wound healing well; No hematoma; no bleeding      RADIOLOGY & ADDITIONAL TESTS:  < from: Xray Chest 2 Views PA/Lat (03.08.23 @ 07:15) >  Impression:    Right-sided ICD. Grossly unchanged.    < end of copied text >

## 2023-03-08 NOTE — DISCHARGE NOTE PROVIDER - NSDCFUSCHEDAPPT_GEN_ALL_CORE_FT
Jared HARRIS  [facility]  UROLOGY [address]  Scheduled Appointment: 03/28/2023    [facility]  ELECTROPH [address]  Scheduled Appointment: 04/10/2023    Lele Rock  [facility]  CARDIOLOGY [id number]  Scheduled Appointment: 04/20/2023

## 2023-03-08 NOTE — PROGRESS NOTE ADULT - ASSESSMENT
66 y/o man with PMH of CAD s/p PCI x 2 in 2018, MI, CHF, s/p [employer] AICD that was placed in [place] in 2019, DM, HTN and hyperlipidemia Presents for redness, swelling and discomfort over AICD site. Cardiology: Dr. Rock    1. Abscess of AICD pocket   s/p removal of AICD on 2/27 by CT surgery - wound care per surgery  wound culture: Staph lugdunensis - sensitivities reviewed  ID f/u appreciated: po Linezolid 600 mg q12h and can proceed with reimplant from ID standpoint with linezolid to be continued 7 days post implant  EP f/u appreciated - s/p reimplant on 3/7 - device interrogated today and no events - no pneumothorax on CXR  case discussed with EP fellow today: Follow up with Dr. Alex in 1 month and then can follow up with Dr. Loja afterwards  pathology: infected sebaceous cyst  blood cultures negative x 3    2. DM type 2 on Trulicity at home - resume on discharge  A1C 7.1    3. 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 CAD s/p PCI x 2, MI  on ASA/statin/coreg/valsartan  can resume Brilinta on discharge    5. HTN - continue current management - BP acceptable    6. Gout  - c/w home allopurinol     7. CKD 3 - stable    8. COVID positive  pt had mild cough, otherwise no symptoms  no further fever which may have been due to the abscess  CT scan of chest: no GGO  stable on RA  isolation per protocol - 10 days - now completed    9. Diarrhea - better now per pt and likely due to abx - no fever or leukocytosis  no incontinence      med reconciliation and discharge papers reviewed by me  spent 40 minutes on the discharge process of this pt

## 2023-03-08 NOTE — PROGRESS NOTE ADULT - SUBJECTIVE AND OBJECTIVE BOX
PARUL LEAHY  67y Male    INTERVAL HPI/OVERNIGHT EVENTS:    pt with some soreness of AICD site.  no fever, other pain, SOB  diarrhea much improved  cleared by EP for outpt f/u    T(F): 95.1 (03-08-23 @ 06:48), Max: 97.2 (03-07-23 @ 19:45)  HR: 74 (03-08-23 @ 06:48) (65 - 81)  BP: 99/57 (03-08-23 @ 06:48) (98/63 - 108/67)  RR: 18 (03-08-23 @ 06:48) (18 - 18)  SpO2: 96% (03-08-23 @ 06:48) (96% - 97%)     I&O's Summary    07 Mar 2023 07:01  -  08 Mar 2023 07:00  --------------------------------------------------------  IN: 200 mL / OUT: 0 mL / NET: 200 mL    Daily Height in cm: 165.1 (07 Mar 2023 14:45)      CAPILLARY BLOOD GLUCOSE      POCT Blood Glucose.: 163 mg/dL (08 Mar 2023 11:21)  POCT Blood Glucose.: 104 mg/dL (08 Mar 2023 07:54)  POCT Blood Glucose.: 230 mg/dL (07 Mar 2023 22:02)  POCT Blood Glucose.: 105 mg/dL (07 Mar 2023 15:02)        PHYSICAL EXAM:  GENERAL: NAD  HEAD:  Normocephalic  EYES:  conjunctiva and sclera clear  ENMT: Moist mucous membranes  NECK: Supple  NERVOUS SYSTEM:  Alert, awake, Good concentration  CHEST/LUNG: CTA b/l  left chest wall dressing  HEART: Regular rate and rhythm  ABDOMEN: Soft, Nontender, Nondistended  EXTREMITIES: No edema  SKIN: warm, dry    Consultant(s) Notes Reviewed:  [x ] YES  [ ] NO  Care Discussed with Consultants/Other Providers [ x] YES  [ ] NO    MEDICATIONS  (STANDING):  allopurinol 100 milliGRAM(s) Oral two times a day  amLODIPine   Tablet 5 milliGRAM(s) Oral daily  aspirin  chewable 81 milliGRAM(s) Oral daily  atorvastatin 80 milliGRAM(s) Oral at bedtime  carvedilol 12.5 milliGRAM(s) Oral every 12 hours  chlorhexidine 2% Cloths 1 Application(s) Topical daily  chlorhexidine 2% Cloths 1 Application(s) Topical two times a day  dextrose 5%. 1000 milliLiter(s) (50 mL/Hr) IV Continuous <Continuous>  dextrose 5%. 1000 milliLiter(s) (100 mL/Hr) IV Continuous <Continuous>  dextrose 50% Injectable 25 Gram(s) IV Push once  dextrose 50% Injectable 12.5 Gram(s) IV Push once  dextrose 50% Injectable 25 Gram(s) IV Push once  furosemide    Tablet 40 milliGRAM(s) Oral daily  glucagon  Injectable 1 milliGRAM(s) IntraMuscular once  insulin glargine Injectable (LANTUS) 20 Unit(s) SubCutaneous at bedtime  insulin lispro (ADMELOG) corrective regimen sliding scale   SubCutaneous three times a day before meals  insulin lispro Injectable (ADMELOG) 6 Unit(s) SubCutaneous three times a day before meals  linezolid    Tablet 600 milliGRAM(s) Oral every 12 hours  valsartan 80 milliGRAM(s) Oral daily    MEDICATIONS  (PRN):  acetaminophen     Tablet .. 650 milliGRAM(s) Oral every 6 hours PRN Temp greater or equal to 38C (100.4F), Mild Pain (1 - 3)  aluminum hydroxide/magnesium hydroxide/simethicone Suspension 30 milliLiter(s) Oral every 4 hours PRN Dyspepsia  dextrose Oral Gel 15 Gram(s) Oral once PRN Blood Glucose LESS THAN 70 milliGRAM(s)/deciliter  melatonin 3 milliGRAM(s) Oral at bedtime PRN Insomnia  ondansetron Injectable 4 milliGRAM(s) IV Push every 8 hours PRN Nausea and/or Vomiting      Telemetry reviewed by me    LABS:                        13.0   9.00  )-----------( 272      ( 08 Mar 2023 06:03 )             39.8     03-08    137  |  100  |  30<H>  ----------------------------<  107<H>  4.4   |  27  |  1.6<H>    Ca    9.1      08 Mar 2023 06:03  Mg     2.1     03-08    TPro  6.2  /  Alb  3.9  /  TBili  0.8  /  DBili  x   /  AST  27  /  ALT  34  /  AlkPhos  121<H>  03-08              RADIOLOGY & ADDITIONAL TESTS:    Imaging or report Personally Reviewed:  [x ] YES  [ ] NO    < from: Xray Chest 2 Views PA/Lat (03.08.23 @ 07:15) >  Impression:    Right-sided ICD. Grossly unchanged.    < end of copied text >      Case discussed with residents and RN on rounds today    Care discussed with pt

## 2023-03-08 NOTE — PROGRESS NOTE ADULT - ASSESSMENT
A/P   Patient s/p ICD implant right side. ([employer])    - CXR shows leads in good position  - Device interrogation done.  No events.  Device is functioning properly  - abx per ID  - Pt cannot shower x 1 week  -  No bath/swimming x 6 weeks  - Leave gauze bandage on until next week and then remove.  Steristrips under gauze stay on  - No lifting right arm above shoulder level x 6 weeks  - No lifting >5 pounds with right arm x 6 weeks  - Follow up with Dr. Alex in 1 month and then can follow up with Dr. Loja after that  - ok to discharge from EP standpoint

## 2023-03-08 NOTE — DISCHARGE NOTE PROVIDER - NSDCCPGOAL_GEN_ALL_CORE_FT
Reason for Disposition    Fever present > 3 days (72 hours)    Protocols used: INFLUENZA - SEASONAL-ADULT-AH    Stated she's had a fever for 6 days. It's been around 101.  I gave her information for [place] urgent care where she will go tonight.  Marry Loomis RN-[facility]     To get better and follow your care plan as instructed.

## 2023-03-08 NOTE — PROGRESS NOTE ADULT - PROVIDER SPECIALTY LIST ADULT
CT Surgery
CT Surgery
Hospitalist
CT Surgery
Electrophysiology
Electrophysiology
Hospitalist
Infectious Disease
Hospitalist

## 2023-03-08 NOTE — PROGRESS NOTE ADULT - REASON FOR ADMISSION
AICD infection

## 2023-03-08 NOTE — DISCHARGE NOTE PROVIDER - NSDCCPCAREPLAN_GEN_ALL_CORE_FT
PRINCIPAL DISCHARGE DIAGNOSIS  Diagnosis: Chest wall abscess  Assessment and Plan of Treatment: Abscess  An abscess is an infected area that contains a collection of pus and debris. It can occur in almost any part of the body and occurs when the tissue gets infection. Symptoms include a painful mass that is red, warm, tender that might break open and HAVE drainage. If your health care provider gave you antibiotics make sure to take the full course and do not stop even if feeling better. You had a left sided AICD pocket infection. Your Left sided AICD was removed and cleaned by Cardiothorcic surgery. Wound cultures were postive for a bacteria called S lugdunensis. You were subsequntly started on Antibiotics. A new AICD was placed in your right chest.  - Please do not shower for 1 week.  - Please do not bath/swim for 6 weeks.  - Leave gauze bandage on until next week and then remove. please leave steristrips under. Gauze stay on.  - No lifting right arm above shoulder level for 6 weeks.  - No lifting more than 5 pounds with right arm for 6 weeks  - Please follow up with Dr. Alex in 1 month and then can follow up with Dr. Loja after that  - Please continue taking medications, including your antibiotic (Linezolid).  SEEK IMMEDIATE MEDICAL CARE IF YOU HAVE ANY OF THE FOLLOWING SYMPTOMS: chills, fever, muscle aches, or red streaking from the area.

## 2023-03-08 NOTE — DISCHARGE NOTE PROVIDER - NSDCMRMEDTOKEN_GEN_ALL_CORE_FT
allopurinol 100 mg oral tablet: 1 tab(s) orally 2 times a day  amLODIPine 5 mg oral tablet: 1 tab(s) orally once a day  aspirin 81 mg oral tablet: 1 tab(s) orally once a day  atorvastatin 80 mg oral tablet: 1 tab(s) orally once a day  Brilinta (ticagrelor) 60 mg oral tablet: 1 tab(s) orally 2 times a day  carvedilol 12.5 mg oral tablet: 1 tab(s) orally 2 times a day  ezetimibe 10 mg oral tablet: 1 tab(s) orally once a day  Lasix 40 mg oral tablet: 1 tab(s) orally once a day  linezolid 600 mg oral tablet: 1 tab(s) orally every 12 hours until 3/10.  Trulicity Pen 1.5 mg/0.5 mL subcutaneous solution: 1 unit(s) subcutaneous every 7 days  valsartan 80 mg oral capsule: 1 cap(s) orally once a day

## 2023-03-08 NOTE — MEDICAL STUDENT PROGRESS NOTE(EDUCATION) - SUBJECTIVE AND OBJECTIVE BOX
LEAHY PARUL  67y  Male    Subjective:  Patient is a 67y old  Male who presents with a chief complaint of AICD infection (27 Feb 2023 12:26)    INTERVAL HPI/OVERNIGHT EVENTS: Pt resting comfortably in bed this AM. Reports no events overnight, mild tenderness at surgical site, otherwise no complaints. Pt noted to have 8-beat run of NSVT overnight on cardiac monitoring.     Objective:  PAST MEDICAL & SURGICAL HISTORY:  Diabetes mellitus, type 2    History of MI, S/P PCI    Hypertension    Gout    Vital Signs Last 24 Hrs  T(C): 35.9 (28 Feb 2023 04:40), Max: 36.9 (27 Feb 2023 21:15)  T(F): 96.6 (28 Feb 2023 04:40), Max: 98.5 (27 Feb 2023 21:15)  HR: 72 (28 Feb 2023 04:40) (63 - 80)  BP: 118/80 (28 Feb 2023 04:40) (116/71 - 152/82)  BP(mean): --  RR: 18 (28 Feb 2023 04:40) (17 - 24)  SpO2: 98% (27 Feb 2023 22:20) (98% - 100%)    Parameters below as of 27 Feb 2023 22:20  Patient On (Oxygen Delivery Method): nasal cannula  O2 Flow (L/min): 3    MEDICATIONS  (STANDING):  allopurinol 100 milliGRAM(s) Oral two times a day  amLODIPine   Tablet 5 milliGRAM(s) Oral daily  aspirin  chewable 81 milliGRAM(s) Oral daily  atorvastatin 80 milliGRAM(s) Oral at bedtime  carvedilol 12.5 milliGRAM(s) Oral every 12 hours  ceFAZolin   IVPB 1000 milliGRAM(s) IV Intermittent every 8 hours  cefTRIAXone   IVPB 2000 milliGRAM(s) IV Intermittent every 24 hours  dextrose 5%. 1000 milliLiter(s) (100 mL/Hr) IV Continuous <Continuous>  dextrose 5%. 1000 milliLiter(s) (50 mL/Hr) IV Continuous <Continuous>  dextrose 50% Injectable 25 Gram(s) IV Push once  dextrose 50% Injectable 12.5 Gram(s) IV Push once  dextrose 50% Injectable 25 Gram(s) IV Push once  furosemide    Tablet 40 milliGRAM(s) Oral daily  glucagon  Injectable 1 milliGRAM(s) IntraMuscular once  insulin glargine Injectable (LANTUS) 20 Unit(s) SubCutaneous at bedtime  insulin lispro (ADMELOG) corrective regimen sliding scale   SubCutaneous three times a day before meals  insulin lispro Injectable (ADMELOG) 6 Unit(s) SubCutaneous three times a day before meals  valsartan 80 milliGRAM(s) Oral daily  vancomycin  IVPB 1250 milliGRAM(s) IV Intermittent every 12 hours    MEDICATIONS  (PRN):  acetaminophen     Tablet .. 650 milliGRAM(s) Oral every 6 hours PRN Temp greater or equal to 38C (100.4F), Mild Pain (1 - 3)  aluminum hydroxide/magnesium hydroxide/simethicone Suspension 30 milliLiter(s) Oral every 4 hours PRN Dyspepsia  dextrose Oral Gel 15 Gram(s) Oral once PRN Blood Glucose LESS THAN 70 milliGRAM(s)/deciliter  melatonin 3 milliGRAM(s) Oral at bedtime PRN Insomnia  ondansetron Injectable 4 milliGRAM(s) IV Push every 8 hours PRN Nausea and/or Vomiting    LABS:             13.6   12.82 )-----------( 275      ( 28 Feb 2023 07:52 )             40.3   02-28  141  |  104  |  29<H>  ----------------------------<  163<H>  4.3   |  24  |  1.4  Ca    9.0      28 Feb 2023 07:52  Mg     2.1     02-28  TPro  6.1  /  Alb  3.8  /  TBili  0.6  /  DBili  x   /  AST  20  /  ALT  19  /  AlkPhos  127<H>  02-28    Culture - Blood (collected 26 Feb 2023 06:55)  Source: .Blood Blood  Preliminary Report (27 Feb 2023 13:02):    No growth to date.    Culture - Blood (collected 25 Feb 2023 16:44)  Source: .Blood Blood  Preliminary Report (27 Feb 2023 05:01):    No growth to date.    Culture - Blood (collected 25 Feb 2023 16:44)  Source: .Blood Blood  Preliminary Report (27 Feb 2023 05:01):    No growth to date.    PHYSICAL EXAM:  GENERAL: NAD, well-groomed, well-developed  HEAD:  Atraumatic, Normocephalic  NERVOUS SYSTEM:  Alert & Oriented X3, Good concentration;  CHEST/LUNG: Clear to percussion bilaterally; No rales, rhonchi, wheezing, or rubs  HEART: Regular rate and rhythm; No murmurs, rubs, or gallops  ABDOMEN: Soft, Nontender, Nondistended; Bowel sounds presen    RADIOLOGY & ADDITIONAL TESTS:  ACC: [id number] EXAM:  XR CHEST PORTABLE ROUTINE 1V   ORDERED BY: TUCKER CHAPMAN     PROCEDURE DATE:  02/27/2023    INTERPRETATION:  Clinical History / Reason for exam: Defibrillator   removal.  Comparison : Chest radiograph 2/25/2023.  Technique/Positioning: Frontal chest radiograph.  Findings:  Support devices: Interval removal of cardiac defibrillation device.  Cardiac/mediastinum/hilum: Unremarkable.  Lung parenchyma/Pleura: No focal consolidation, effusion, or pneumothorax.  Skeleton/soft tissues: No acute osseous abnormalities.  Impression:  Interval removal of cardiac defibrillation device.  No radiographic evidence of acute cardiopulmonary disease.  --- End of Report ---    DAVID SANON MD; Resident Radiologist  This document has been electronically signed.  LUIS MALONE MD; Attending Radiologist  This document has been electronically signed. Feb 27 2023 11:16PM    ACC: [id number] EXAM:  CT CHEST IC   ORDERED BY: МАРИЯ MATAMOROS     PROCEDURE DATE:  02/25/2023      INTERPRETATION:  CLINICAL HISTORY / REASON FOR EXAM: Pain; chest wall   abscess.    TECHNIQUE: Multislice helical sections were obtained from the thoracic   inlet to the lung bases following administration of 85 mL Omnipaque 350   intravenous contrast, 25 mL discarded. Coronal, sagittal and 3D/MIP   reformatted images are also submitted.    CORRELATION: Chest radiograph from February 25, 2023.    FINDINGS:    TUBES/LINES: ICD device within the subcutaneous tissues of the left chest   and leads terminating in the right atrium and right ventricle.    LUNGS, PLEURA, AND AIRWAYS: Bibasilar atelectasis/scarring. No   pneumothorax. No consolidation or pleural effusion. Central airways   patent.    MEDIASTINUM/LYMPH NODES: No mediastinal or axillary lymphadenopathy.    HEART/GREAT VESSELS: No pericardial effusion. Heart size unremarkable.   Coronary arterial stent. No aneurysmal dilation of the thoracic aorta.    BONES/SOFT TISSUES: Collection adjacent to pacing device within the left   chest subcutaneous tissues measuring 2.6 x 2.3 x 1.7 cm (series 5, image   95). Mild to moderate degenerative changes of the thoracic spine.    VISUALIZED UPPER ABDOMEN: Left renal indeterminate hypodensity.    IMPRESSION:  Collection adjacent to ICD within the left chest subcutaneous tissues   measuring 2.6 x 2.3 x 1.7 cm.  --- End of Report ---  VENKAT IRWIN MD; Attending Radiologist  This document has been electronically signed. Feb 25 2023  6:53PM  
  PARUL LEAHY  67y  Male    Subjective:  Patient is a 67y old  Male who presents with a chief complaint of AICD infection (27 Feb 2023 12:26)    INTERVAL HPI/OVERNIGHT EVENTS: Pt resting comfortably in bed this AM. Reports no events overnight, mild tenderness at surgical site, otherwise no complaints. Pt reports no overnight events.   Objective:  PAST MEDICAL & SURGICAL HISTORY:  Diabetes mellitus, type 2    History of MI, S/P PCI    Hypertension    Gout    Vital Signs Last 24 Hrs  T(C): 36 (01 Mar 2023 05:22), Max: 36.7 (28 Feb 2023 19:45)  T(F): 96.8 (01 Mar 2023 05:22), Max: 98.1 (28 Feb 2023 19:45)  HR: 63 (01 Mar 2023 05:22) (63 - 72)  BP: 113/67 (01 Mar 2023 05:22) (101/69 - 113/67)  BP(mean): --  RR: 18 (01 Mar 2023 05:22) (18 - 18)  SpO2: 95% (28 Feb 2023 22:54) (95% - 95%)    Parameters below as of 28 Feb 2023 22:54  Patient On (Oxygen Delivery Method): room air    MEDICATIONS  (STANDING):  allopurinol 100 milliGRAM(s) Oral two times a day  amLODIPine   Tablet 5 milliGRAM(s) Oral daily  aspirin  chewable 81 milliGRAM(s) Oral daily  atorvastatin 80 milliGRAM(s) Oral at bedtime  carvedilol 12.5 milliGRAM(s) Oral every 12 hours  cefTRIAXone   IVPB 2000 milliGRAM(s) IV Intermittent every 24 hours  chlorhexidine 2% Cloths 1 Application(s) Topical daily  dextrose 5%. 1000 milliLiter(s) (100 mL/Hr) IV Continuous <Continuous>  dextrose 5%. 1000 milliLiter(s) (50 mL/Hr) IV Continuous <Continuous>  dextrose 50% Injectable 25 Gram(s) IV Push once  dextrose 50% Injectable 12.5 Gram(s) IV Push once  dextrose 50% Injectable 25 Gram(s) IV Push once  furosemide    Tablet 40 milliGRAM(s) Oral daily  glucagon  Injectable 1 milliGRAM(s) IntraMuscular once  heparin   Injectable 5000 Unit(s) SubCutaneous every 8 hours  insulin glargine Injectable (LANTUS) 20 Unit(s) SubCutaneous at bedtime  insulin lispro (ADMELOG) corrective regimen sliding scale   SubCutaneous three times a day before meals  insulin lispro Injectable (ADMELOG) 6 Unit(s) SubCutaneous three times a day before meals  valsartan 80 milliGRAM(s) Oral daily  vancomycin  IVPB 1250 milliGRAM(s) IV Intermittent every 12 hours    MEDICATIONS  (PRN):  acetaminophen     Tablet .. 650 milliGRAM(s) Oral every 6 hours PRN Temp greater or equal to 38C (100.4F), Mild Pain (1 - 3)  aluminum hydroxide/magnesium hydroxide/simethicone Suspension 30 milliLiter(s) Oral every 4 hours PRN Dyspepsia  dextrose Oral Gel 15 Gram(s) Oral once PRN Blood Glucose LESS THAN 70 milliGRAM(s)/deciliter  melatonin 3 milliGRAM(s) Oral at bedtime PRN Insomnia  ondansetron Injectable 4 milliGRAM(s) IV Push every 8 hours PRN Nausea and/or Vomiting    LABS:                        12.6   14.71 )-----------( 265      ( 01 Mar 2023 06:28 )             37.5     03-01    142  |  105  |  36<H>  ----------------------------<  112<H>  4.1   |  26  |  1.6<H>    Ca    9.0      01 Mar 2023 06:28  Mg     2.2     03-01    TPro  5.9<L>  /  Alb  3.5  /  TBili  0.4  /  DBili  x   /  AST  21  /  ALT  13  /  AlkPhos  122<H>  03-01    PHYSICAL EXAM:  GENERAL: NAD, well-groomed, well-developed  HEAD:  Atraumatic, Normocephalic  NERVOUS SYSTEM:  Alert & Oriented X3, Good concentration;  CHEST/LUNG: Clear to percussion bilaterally; No rales, rhonchi, wheezing, or rubs  HEART: Regular rate and rhythm; No murmurs, rubs, or gallops  ABDOMEN: Soft, Nontender, Nondistended; Bowel sounds present    RADIOLOGY & ADDITIONAL TESTS:  ACC: [id number] EXAM:  XR CHEST PORTABLE ROUTINE 1V   ORDERED BY: TUCKER CHAPMAN     PROCEDURE DATE:  02/27/2023    INTERPRETATION:  Clinical History / Reason for exam: Defibrillator   removal.  Comparison : Chest radiograph 2/25/2023.  Technique/Positioning: Frontal chest radiograph.  Findings:  Support devices: Interval removal of cardiac defibrillation device.  Cardiac/mediastinum/hilum: Unremarkable.  Lung parenchyma/Pleura: No focal consolidation, effusion, or pneumothorax.  Skeleton/soft tissues: No acute osseous abnormalities.  Impression:  Interval removal of cardiac defibrillation device.  No radiographic evidence of acute cardiopulmonary disease.  --- End of Report ---    DAVID SANON MD; Resident Radiologist  This document has been electronically signed.  LUIS MALONE MD; Attending Radiologist  This document has been electronically signed. Feb 27 2023 11:16PM    ACC: [id number] EXAM:  CT CHEST IC   ORDERED BY: МАРИЯ MATAMOROS     PROCEDURE DATE:  02/25/2023      INTERPRETATION:  CLINICAL HISTORY / REASON FOR EXAM: Pain; chest wall   abscess.    TECHNIQUE: Multislice helical sections were obtained from the thoracic   inlet to the lung bases following administration of 85 mL Omnipaque 350   intravenous contrast, 25 mL discarded. Coronal, sagittal and 3D/MIP   reformatted images are also submitted.    CORRELATION: Chest radiograph from February 25, 2023.    FINDINGS:    TUBES/LINES: ICD device within the subcutaneous tissues of the left chest   and leads terminating in the right atrium and right ventricle.    LUNGS, PLEURA, AND AIRWAYS: Bibasilar atelectasis/scarring. No   pneumothorax. No consolidation or pleural effusion. Central airways   patent.    MEDIASTINUM/LYMPH NODES: No mediastinal or axillary lymphadenopathy.    HEART/GREAT VESSELS: No pericardial effusion. Heart size unremarkable.   Coronary arterial stent. No aneurysmal dilation of the thoracic aorta.    BONES/SOFT TISSUES: Collection adjacent to pacing device within the left   chest subcutaneous tissues measuring 2.6 x 2.3 x 1.7 cm (series 5, image   95). Mild to moderate degenerative changes of the thoracic spine.    VISUALIZED UPPER ABDOMEN: Left renal indeterminate hypodensity.    IMPRESSION:  Collection adjacent to ICD within the left chest subcutaneous tissues   measuring 2.6 x 2.3 x 1.7 cm.  --- End of Report ---  VENKAT IRWIN MD; Attending Radiologist  This document has been electronically signed. Feb 25 2023  6:53PM  
LEAHY PARUL  67y  Male    Subjective:  Patient is a 67y old  Male who presents with a chief complaint of AICD infection (2023 12:26)    INTERVAL HPI/OVERNIGHT EVENTS: Pt resting comfortably in bed this AM. Pt reports improvement in diarrhea. Otherwise, pt reports no events overnight.    Objective:  PAST MEDICAL & SURGICAL HISTORY:  Diabetes mellitus, type 2    History of MI, S/P PCI    Hypertension    Gout    Vital Signs Last 24 Hrs  T(C): 35.7 (07 Mar 2023 05:34), Max: 36.8 (06 Mar 2023 22:34)  T(F): 96.3 (07 Mar 2023 05:34), Max: 98.3 (06 Mar 2023 22:34)  HR: 61 (07 Mar 2023 05:34) (61 - 66)  BP: 110/65 (07 Mar 2023 05:34) (93/58 - 110/65)  BP(mean): --  RR: 18 (07 Mar 2023 05:34) (18 - 18)  SpO2: --    MEDICATIONS  (STANDING):  allopurinol 100 milliGRAM(s) Oral two times a day  amLODIPine   Tablet 5 milliGRAM(s) Oral daily  aspirin  chewable 81 milliGRAM(s) Oral daily  atorvastatin 80 milliGRAM(s) Oral at bedtime  carvedilol 12.5 milliGRAM(s) Oral every 12 hours  chlorhexidine 2% Cloths 1 Application(s) Topical daily  chlorhexidine 2% Cloths 1 Application(s) Topical two times a day  dextrose 5%. 1000 milliLiter(s) (50 mL/Hr) IV Continuous <Continuous>  dextrose 5%. 1000 milliLiter(s) (100 mL/Hr) IV Continuous <Continuous>  dextrose 50% Injectable 25 Gram(s) IV Push once  dextrose 50% Injectable 12.5 Gram(s) IV Push once  dextrose 50% Injectable 25 Gram(s) IV Push once  furosemide    Tablet 40 milliGRAM(s) Oral daily  glucagon  Injectable 1 milliGRAM(s) IntraMuscular once  insulin glargine Injectable (LANTUS) 20 Unit(s) SubCutaneous at bedtime  insulin lispro (ADMELOG) corrective regimen sliding scale   SubCutaneous three times a day before meals  insulin lispro Injectable (ADMELOG) 6 Unit(s) SubCutaneous three times a day before meals  linezolid    Tablet 600 milliGRAM(s) Oral every 12 hours  valsartan 80 milliGRAM(s) Oral daily    MEDICATIONS  (PRN):  acetaminophen     Tablet .. 650 milliGRAM(s) Oral every 6 hours PRN Temp greater or equal to 38C (100.4F), Mild Pain (1 - 3)  aluminum hydroxide/magnesium hydroxide/simethicone Suspension 30 milliLiter(s) Oral every 4 hours PRN Dyspepsia  dextrose Oral Gel 15 Gram(s) Oral once PRN Blood Glucose LESS THAN 70 milliGRAM(s)/deciliter  melatonin 3 milliGRAM(s) Oral at bedtime PRN Insomnia  ondansetron Injectable 4 milliGRAM(s) IV Push every 8 hours PRN Nausea and/or Vomiting      LABS:                        11.7   7.99  )-----------( 278      ( 07 Mar 2023 06:54 )             35.9     03-07    139  |  105  |  34<H>  ----------------------------<  100<H>  4.6   |  23  |  1.5    Ca    9.0      07 Mar 2023 06:54  Mg     2.0     -07    TPro  5.6<L>  /  Alb  3.5  /  TBili  0.5  /  DBili  x   /  AST  24  /  ALT  34  /  AlkPhos  115  03-07    Urinalysis Basic - ( 06 Mar 2023 12:00 )    Color: Light Yellow / Appearance: Clear / S.012 / pH: x  Gluc: x / Ketone: Negative  / Bili: Negative / Urobili: <2 mg/dL   Blood: x / Protein: Negative / Nitrite: Negative   Leuk Esterase: Negative / RBC: x / WBC x   Sq Epi: x / Non Sq Epi: x / Bacteria: x    PHYSICAL EXAM:  GENERAL: NAD, well-groomed, well-developed  HEAD:  Atraumatic, Normocephalic  NERVOUS SYSTEM:  Alert & Oriented X3, Good concentration;  CHEST/LUNG: Clear to percussion bilaterally; No rales, rhonchi, wheezing, or rubs  HEART: Regular rate and rhythm; No murmurs, rubs, or gallops  ABDOMEN: Soft, Nontender, Nondistended; Bowel sounds present    RADIOLOGY & ADDITIONAL TESTS:    ACC: [id number] EXAM:  ECHO TTE WO CON COMP W DOPP                          PROCEDURE DATE:  2023      INTERPRETATION:   [place] [address]                Phone: [phone].   TRANSTHORACIC ECHOCARDIOGRAM REPORT    Patient Name:   PARUL LEAHY Accession #: [id number]  Medical Rec #:  [id number]  Height:      65.0 in 165.0 cm  YOB: 1955  Weight:      174.2 lb 79.00 kg  Patient Age:    67 years   BSA:         1.86 m²  Patient Gender: M          BP:          114/69 mmHg    Date of Exam:        3/2/2023 11:50:59 AM  Referring Physician: Service  Sonographer:         Melania Jolly  Fellow:              Flakita De Anda    Reading Physician:   Kristopher Escamilla MD, [facility].    Procedure:     2D Echo/Doppler/Color Doppler Complete.  Indications:   I33.0 - Infective Endocarditis  Diagnosis:     I33.0 - Acute and subacute infective endocarditis  Study Details: Technically limited study.    Summary:   1. Left ventricular ejection fraction, by visual estimation, is 30 to   35%.   2. Multiple left ventricular regional wall motion abnormalities exist.   See wall motion findings.   3. Spectral Doppler shows impaired relaxation pattern of left   ventricular myocardial filling (Grade I diastolic dysfunction).   4. Technically difficult study. Mid and apical anteroseptal walls and   apex apears severely hypokinetic / dyskinetic. Poorly visualized   endocardial borders. 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   5. Normal left atrial size.   6. Normal right atrial size.   7. No evidence of mitral valve regurgitation.   8. LA volume Index is 15.6 ml/m² ml/m2.    PHYSICIAN INTERPRETATION:  Left Ventricle: Left ventricular ejection fraction, by visual estimation,   is 30 to 35%. Spectral Doppler shows impaired relaxation pattern of left   ventricular myocardial filling (Grade I diastolic dysfunction). Normal LV   filling pressures. Technically difficult study. Mid and apical   anteroseptal walls and apex apears severely hypokinetic / dyskinetic.   Poorly visualized endocardial borders. 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    LV Wall Scoring:  The apical septal segment, apical anterior segment, apical inferior   segment,  and apex are akinetic. The mid anteroseptal segment, mid inferoseptal   segment,  and mid inferior segment are hypokinetic. All remaining scored segments   are  normal.    Right Ventricle: The right ventricular size is normal. RV systolic   function is normal.  Left Atrium: Normal left atrial size. LA volume Index is 15.6 ml/m² ml/m2.  Right Atrium: Normal right atrial size.  Mitral Valve: No evidence of mitral valve stenosis. No evidence of mitral   valve regurgitation is seen.  Tricuspid Valve: The tricuspid valve is not well seen.  Aortic Valve: The aortic valve was not well visualized. No aortic   stenosis. No evidence of aortic valve regurgitation is seen.  Pulmonic Valve: The pulmonic valve was not well visualized.    2D AND M-MODE MEASUREMENTS (normalranges within parentheses):  Right Ventricle:  TAPSE:           3.10 cm    SPECTRAL DOPPLER ANALYSIS:  LV DIASTOLIC FUNCTION:  MV Peak E: 0.60 m/s Decel Time: 162 msec  MV Peak A: 0.84 m/s  E/A Ratio: 0.71    Aortic Valve:  AoV VMax:    1.28 m/s  AoVVTI:     0.26 m  AoV Pk Grad: 6.5 mmHg  AoV Mn Grad: 3.7 mmHg    LVOT Vmax: 1.00 m/s  LVOT VTI:  0.20 m    Mitral Valve:  MV P1/2 Time: 46.95 msec  MV Area, PHT: 4.69 cm²  [id number] Kristopher Escamilla MD, FACC, Electronically signed on 3/2/2023 at   5:08:39 PM  *** Final ***  
LEAHY PARUL  67y  Male    Subjective:  Patient is a 67y old  Male who presents with a chief complaint of AICD infection (27 Feb 2023 12:26)    INTERVAL HPI/OVERNIGHT EVENTS: Pt resting comfortably in bed this AM. Reports 1 episode of non-bloody diarrhea, mild tenderness at surgical site, otherwise no complaints. Pt reports no overnight events.   Objective:  PAST MEDICAL & SURGICAL HISTORY:  Diabetes mellitus, type 2    History of MI, S/P PCI    Hypertension    Gout    Vital Signs Last 24 Hrs  T(C): 36.6 (02 Mar 2023 04:45), Max: 36.6 (02 Mar 2023 04:45)  T(F): 97.8 (02 Mar 2023 04:45), Max: 97.8 (02 Mar 2023 04:45)  HR: 70 (02 Mar 2023 04:45) (70 - 76)  BP: 114/69 (02 Mar 2023 04:45) (96/64 - 114/69)  BP(mean): --  RR: 18 (02 Mar 2023 04:45) (18 - 18)  SpO2: 94% (01 Mar 2023 20:50) (94% - 95%)    Parameters below as of 01 Mar 2023 20:50  Patient On (Oxygen Delivery Method): room air    MEDICATIONS  (STANDING):  allopurinol 100 milliGRAM(s) Oral two times a day  amLODIPine   Tablet 5 milliGRAM(s) Oral daily  aspirin  chewable 81 milliGRAM(s) Oral daily  atorvastatin 80 milliGRAM(s) Oral at bedtime  carvedilol 12.5 milliGRAM(s) Oral every 12 hours  cefTRIAXone   IVPB 2000 milliGRAM(s) IV Intermittent every 24 hours  chlorhexidine 2% Cloths 1 Application(s) Topical daily  dextrose 5%. 1000 milliLiter(s) (100 mL/Hr) IV Continuous <Continuous>  dextrose 5%. 1000 milliLiter(s) (50 mL/Hr) IV Continuous <Continuous>  dextrose 50% Injectable 25 Gram(s) IV Push once  dextrose 50% Injectable 12.5 Gram(s) IV Push once  dextrose 50% Injectable 25 Gram(s) IV Push once  furosemide    Tablet 40 milliGRAM(s) Oral daily  glucagon  Injectable 1 milliGRAM(s) IntraMuscular once  heparin   Injectable 5000 Unit(s) SubCutaneous every 8 hours  insulin glargine Injectable (LANTUS) 20 Unit(s) SubCutaneous at bedtime  insulin lispro (ADMELOG) corrective regimen sliding scale   SubCutaneous three times a day before meals  insulin lispro Injectable (ADMELOG) 6 Unit(s) SubCutaneous three times a day before meals  valsartan 80 milliGRAM(s) Oral daily  vancomycin  IVPB 1250 milliGRAM(s) IV Intermittent every 12 hours    MEDICATIONS  (PRN):  acetaminophen     Tablet .. 650 milliGRAM(s) Oral every 6 hours PRN Temp greater or equal to 38C (100.4F), Mild Pain (1 - 3)  aluminum hydroxide/magnesium hydroxide/simethicone Suspension 30 milliLiter(s) Oral every 4 hours PRN Dyspepsia  dextrose Oral Gel 15 Gram(s) Oral once PRN Blood Glucose LESS THAN 70 milliGRAM(s)/deciliter  melatonin 3 milliGRAM(s) Oral at bedtime PRN Insomnia  ondansetron Injectable 4 milliGRAM(s) IV Push every 8 hours PRN Nausea and/or Vomiting    LABS:  LABS:                        12.6   10.52 )-----------( 250      ( 02 Mar 2023 07:56 )             37.3     03-02    139  |  107  |  35<H>  ----------------------------<  128<H>  4.1   |  20  |  1.4    Ca    8.5      02 Mar 2023 07:56  Mg     2.1     03-02    TPro  5.8<L>  /  Alb  3.6  /  TBili  0.3  /  DBili  x   /  AST  44<H>  /  ALT  39  /  AlkPhos  123<H>  03-02    Culture - Other (collected 27 Feb 2023 19:49)  Source: .Other None  Preliminary Report (01 Mar 2023 19:11):    Numerous Staphylococcus lugdunensis    Culture - Other (collected 27 Feb 2023 19:48)  Source: .Other None  Preliminary Report (01 Mar 2023 13:55):    No growth to date.    PHYSICAL EXAM:  GENERAL: NAD, well-groomed, well-developed  HEAD:  Atraumatic, Normocephalic  NERVOUS SYSTEM:  Alert & Oriented X3, Good concentration;  CHEST/LUNG: Clear to percussion bilaterally; No rales, rhonchi, wheezing, or rubs  HEART: Regular rate and rhythm; No murmurs, rubs, or gallops  ABDOMEN: Soft, Nontender, Nondistended; Bowel sounds present    RADIOLOGY & ADDITIONAL TESTS:  ACC: [id number] EXAM:  XR CHEST PORTABLE ROUTINE 1V   ORDERED BY: TUCKER CHAPMAN     PROCEDURE DATE:  02/27/2023    INTERPRETATION:  Clinical History / Reason for exam: Defibrillator   removal.  Comparison : Chest radiograph 2/25/2023.  Technique/Positioning: Frontal chest radiograph.  Findings:  Support devices: Interval removal of cardiac defibrillation device.  Cardiac/mediastinum/hilum: Unremarkable.  Lung parenchyma/Pleura: No focal consolidation, effusion, or pneumothorax.  Skeleton/soft tissues: No acute osseous abnormalities.  Impression:  Interval removal of cardiac defibrillation device.  No radiographic evidence of acute cardiopulmonary disease.  --- End of Report ---    DAVID SANON MD; Resident Radiologist  This document has been electronically signed.  LUIS MALONE MD; Attending Radiologist  This document has been electronically signed. Feb 27 2023 11:16PM    ACC: [id number] EXAM:  CT CHEST IC   ORDERED BY: МАРИЯ MATAMOROS     PROCEDURE DATE:  02/25/2023      INTERPRETATION:  CLINICAL HISTORY / REASON FOR EXAM: Pain; chest wall   abscess.    TECHNIQUE: Multislice helical sections were obtained from the thoracic   inlet to the lung bases following administration of 85 mL Omnipaque 350   intravenous contrast, 25 mL discarded. Coronal, sagittal and 3D/MIP   reformatted images are also submitted.    CORRELATION: Chest radiograph from February 25, 2023.    FINDINGS:    TUBES/LINES: ICD device within the subcutaneous tissues of the left chest   and leads terminating in the right atrium and right ventricle.    LUNGS, PLEURA, AND AIRWAYS: Bibasilar atelectasis/scarring. No   pneumothorax. No consolidation or pleural effusion. Central airways   patent.    MEDIASTINUM/LYMPH NODES: No mediastinal or axillary lymphadenopathy.    HEART/GREAT VESSELS: No pericardial effusion. Heart size unremarkable.   Coronary arterial stent. No aneurysmal dilation of the thoracic aorta.    BONES/SOFT TISSUES: Collection adjacent to pacing device within the left   chest subcutaneous tissues measuring 2.6 x 2.3 x 1.7 cm (series 5, image   95). Mild to moderate degenerative changes of the thoracic spine.    VISUALIZED UPPER ABDOMEN: Left renal indeterminate hypodensity.    IMPRESSION:  Collection adjacent to ICD within the left chest subcutaneous tissues   measuring 2.6 x 2.3 x 1.7 cm.  --- End of Report ---  VENKAT IRWIN MD; Attending Radiologist  This document has been electronically signed. Feb 25 2023  6:53PM  
ARMOND PARUL  67y  Male    Subjective:  Patient is a 67y old  Male who presents with a chief complaint of AICD infection (2023 12:26)    INTERVAL HPI/OVERNIGHT EVENTS: Pt went for AICD implantation yesterday. Today, pt reports mild pain at site of procedure, otherwise no complaints.    Objective:  PAST MEDICAL & SURGICAL HISTORY:  Diabetes mellitus, type 2    History of MI, S/P PCI    Hypertension    Gout    Vital Signs Last 24 Hrs  T(C): 35.1 (23 @ 06:48), Max: 36.2 (23 @ 19:45)  T(F): 95.1 (23 @ 06:48), Max: 97.2 (23 @ 19:45)  HR: 74 (23 @ 06:48) (65 - 81)  BP: 99/57 (23 @ 06:48) (98/63 - 108/67)  BP(mean): 83 (23 @ 14:45) (83 - 83)  RR: 18 (23 @ 06:48) (18 - 18)  SpO2: 96% (23 @ 06:48) (96% - 97%)    MEDICATIONS  (STANDING):  allopurinol 100 milliGRAM(s) Oral two times a day  amLODIPine   Tablet 5 milliGRAM(s) Oral daily  aspirin  chewable 81 milliGRAM(s) Oral daily  atorvastatin 80 milliGRAM(s) Oral at bedtime  carvedilol 12.5 milliGRAM(s) Oral every 12 hours  chlorhexidine 2% Cloths 1 Application(s) Topical daily  chlorhexidine 2% Cloths 1 Application(s) Topical two times a day  dextrose 5%. 1000 milliLiter(s) (100 mL/Hr) IV Continuous <Continuous>  dextrose 5%. 1000 milliLiter(s) (50 mL/Hr) IV Continuous <Continuous>  dextrose 50% Injectable 25 Gram(s) IV Push once  dextrose 50% Injectable 12.5 Gram(s) IV Push once  dextrose 50% Injectable 25 Gram(s) IV Push once  furosemide    Tablet 40 milliGRAM(s) Oral daily  glucagon  Injectable 1 milliGRAM(s) IntraMuscular once  insulin glargine Injectable (LANTUS) 20 Unit(s) SubCutaneous at bedtime  insulin lispro (ADMELOG) corrective regimen sliding scale   SubCutaneous three times a day before meals  insulin lispro Injectable (ADMELOG) 6 Unit(s) SubCutaneous three times a day before meals  linezolid    Tablet 600 milliGRAM(s) Oral every 12 hours  valsartan 80 milliGRAM(s) Oral daily    MEDICATIONS  (PRN):  acetaminophen     Tablet .. 650 milliGRAM(s) Oral every 6 hours PRN Temp greater or equal to 38C (100.4F), Mild Pain (1 - 3)  aluminum hydroxide/magnesium hydroxide/simethicone Suspension 30 milliLiter(s) Oral every 4 hours PRN Dyspepsia  dextrose Oral Gel 15 Gram(s) Oral once PRN Blood Glucose LESS THAN 70 milliGRAM(s)/deciliter  melatonin 3 milliGRAM(s) Oral at bedtime PRN Insomnia  ondansetron Injectable 4 milliGRAM(s) IV Push every 8 hours PRN Nausea and/or Vomiting      LABS:                        13.0   9.00  )-----------( 272      ( 08 Mar 2023 06:03 )             39.8     03-08    137  |  100  |  30<H>  ----------------------------<  107<H>  4.4   |  27  |  1.6<H>    Ca    9.1      08 Mar 2023 06:03  Mg     2.1     03-08    TPro  6.2  /  Alb  3.9  /  TBili  0.8  /  DBili  x   /  AST  27  /  ALT  34  /  AlkPhos  121<H>  03-08          Urinalysis Basic - ( 06 Mar 2023 12:00 )    Color: Light Yellow / Appearance: Clear / S.012 / pH: x  Gluc: x / Ketone: Negative  / Bili: Negative / Urobili: <2 mg/dL   Blood: x / Protein: Negative / Nitrite: Negative   Leuk Esterase: Negative / RBC: x / WBC x   Sq Epi: x / Non Sq Epi: x / Bacteria: x    PHYSICAL EXAM:  GENERAL: NAD, well-groomed, well-developed  HEAD:  Atraumatic, Normocephalic  NERVOUS SYSTEM:  Alert & Oriented X3, Good concentration;  CHEST/LUNG: Clear to percussion bilaterally; No rales, rhonchi, wheezing, or rubs  HEART: Regular rate and rhythm; No murmurs, rubs, or gallops  ABDOMEN: Soft, Nontender, Nondistended; Bowel sounds present    RADIOLOGY & ADDITIONAL TESTS:    ACC: [id number] EXAM:  ECHO TTE WO CON COMP W DOPP                          PROCEDURE DATE:  2023      INTERPRETATION:   [place] [address]                Phone: [phone].   TRANSTHORACIC ECHOCARDIOGRAM REPORT    Patient Name:   PARUL LEAHY Accession #: [id number]  Medical Rec #:  [id number]  Height:      65.0 in 165.0 cm  YOB: 1955  Weight:      174.2 lb 79.00 kg  Patient Age:    67 years   BSA:         1.86 m²  Patient Gender: M          BP:          114/69 mmHg    Date of Exam:        3/2/2023 11:50:59 AM  Referring Physician: Service  Sonographer:         Melania Jolly  Fellow:              Flakita De Anda    Reading Physician:   Kristopher Escamilla MD, [facility].    Procedure:     2D Echo/Doppler/Color Doppler Complete.  Indications:   I33.0 - Infective Endocarditis  Diagnosis:     I33.0 - Acute and subacute infective endocarditis  Study Details: Technically limited study.    Summary:   1. Left ventricular ejection fraction, by visual estimation, is 30 to   35%.   2. Multiple left ventricular regional wall motion abnormalities exist.   See wall motion findings.   3. Spectral Doppler shows impaired relaxation pattern of left   ventricular myocardial filling (Grade I diastolic dysfunction).   4. Technically difficult study. Mid and apical anteroseptal walls and   apex apears severely hypokinetic / dyskinetic. Poorly visualized   endocardial borders. 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   5. Normal left atrial size.   6. Normal right atrial size.   7. No evidence of mitral valve regurgitation.   8. LA volume Index is 15.6 ml/m² ml/m2.    PHYSICIAN INTERPRETATION:  Left Ventricle: Left ventricular ejection fraction, by visual estimation,   is 30 to 35%. Spectral Doppler shows impaired relaxation pattern of left   ventricular myocardial filling (Grade I diastolic dysfunction). Normal LV   filling pressures. Technically difficult study. Mid and apical   anteroseptal walls and apex apears severely hypokinetic / dyskinetic.   Poorly visualized endocardial borders. 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    LV Wall Scoring:  The apical septal segment, apical anterior segment, apical inferior   segment,  and apex are akinetic. The mid anteroseptal segment, mid inferoseptal   segment,  and mid inferior segment are hypokinetic. All remaining scored segments   are  normal.    Right Ventricle: The right ventricular size is normal. RV systolic   function is normal.  Left Atrium: Normal left atrial size. LA volume Index is 15.6 ml/m² ml/m2.  Right Atrium: Normal right atrial size.  Mitral Valve: No evidence of mitral valve stenosis. No evidence of mitral   valve regurgitation is seen.  Tricuspid Valve: The tricuspid valve is not well seen.  Aortic Valve: The aortic valve was not well visualized. No aortic   stenosis. No evidence of aortic valve regurgitation is seen.  Pulmonic Valve: The pulmonic valve was not well visualized.    2D AND M-MODE MEASUREMENTS (normalranges within parentheses):  Right Ventricle:  TAPSE:           3.10 cm    SPECTRAL DOPPLER ANALYSIS:  LV DIASTOLIC FUNCTION:  MV Peak E: 0.60 m/s Decel Time: 162 msec  MV Peak A: 0.84 m/s  E/A Ratio: 0.71    Aortic Valve:  AoV VMax:    1.28 m/s  AoVVTI:     0.26 m  AoV Pk Grad: 6.5 mmHg  AoV Mn Grad: 3.7 mmHg    LVOT Vmax: 1.00 m/s  LVOT VTI:  0.20 m    Mitral Valve:  MV P1/2 Time: 46.95 msec  MV Area, PHT: 4.69 cm²  [id number] Kristopher Escamilla MD, FACC, Electronically signed on 3/2/2023 at   5:08:39 PM  *** Final ***  
PARUL LEAHY  67y  Male    Subjective:  Patient is a 67y old  Male who presents with a chief complaint of AICD infection (27 Feb 2023 12:26)    INTERVAL HPI/OVERNIGHT EVENTS: Pt resting comfortably in bed this AM. Pt reports improvement in diarrhea. Otherwise, pt reports no events overnight.    Objective:  PAST MEDICAL & SURGICAL HISTORY:  Diabetes mellitus, type 2    History of MI, S/P PCI    Hypertension    Gout    Vital Signs Last 24 Hrs  T(C): 36.3 (06 Mar 2023 04:49), Max: 36.6 (05 Mar 2023 20:05)  T(F): 97.4 (06 Mar 2023 04:49), Max: 97.9 (05 Mar 2023 20:05)  HR: 63 (06 Mar 2023 04:49) (63 - 71)  BP: 110/68 (06 Mar 2023 06:43) (101/60 - 114/69)  BP(mean): --  RR: 18 (06 Mar 2023 04:49) (18 - 18)  SpO2: 97% (05 Mar 2023 22:10) (97% - 97%)    Parameters below as of 05 Mar 2023 22:10  Patient On (Oxygen Delivery Method): room air    MEDICATIONS  (STANDING):  allopurinol 100 milliGRAM(s) Oral two times a day  amLODIPine   Tablet 5 milliGRAM(s) Oral daily  aspirin  chewable 81 milliGRAM(s) Oral daily  atorvastatin 80 milliGRAM(s) Oral at bedtime  carvedilol 12.5 milliGRAM(s) Oral every 12 hours  chlorhexidine 2% Cloths 1 Application(s) Topical two times a day  chlorhexidine 2% Cloths 1 Application(s) Topical daily  dextrose 5%. 1000 milliLiter(s) (50 mL/Hr) IV Continuous <Continuous>  dextrose 5%. 1000 milliLiter(s) (100 mL/Hr) IV Continuous <Continuous>  dextrose 50% Injectable 25 Gram(s) IV Push once  dextrose 50% Injectable 12.5 Gram(s) IV Push once  dextrose 50% Injectable 25 Gram(s) IV Push once  furosemide    Tablet 40 milliGRAM(s) Oral daily  glucagon  Injectable 1 milliGRAM(s) IntraMuscular once  heparin   Injectable 5000 Unit(s) SubCutaneous every 8 hours  insulin glargine Injectable (LANTUS) 20 Unit(s) SubCutaneous at bedtime  insulin lispro (ADMELOG) corrective regimen sliding scale   SubCutaneous three times a day before meals  insulin lispro Injectable (ADMELOG) 6 Unit(s) SubCutaneous three times a day before meals  linezolid    Tablet 600 milliGRAM(s) Oral every 12 hours  valsartan 80 milliGRAM(s) Oral daily    MEDICATIONS  (PRN):  acetaminophen     Tablet .. 650 milliGRAM(s) Oral every 6 hours PRN Temp greater or equal to 38C (100.4F), Mild Pain (1 - 3)  aluminum hydroxide/magnesium hydroxide/simethicone Suspension 30 milliLiter(s) Oral every 4 hours PRN Dyspepsia  dextrose Oral Gel 15 Gram(s) Oral once PRN Blood Glucose LESS THAN 70 milliGRAM(s)/deciliter  melatonin 3 milliGRAM(s) Oral at bedtime PRN Insomnia  ondansetron Injectable 4 milliGRAM(s) IV Push every 8 hours PRN Nausea and/or Vomiting    LABS:                        12.2   8.44  )-----------( 269      ( 06 Mar 2023 06:11 )             37.6     03-06    140  |  107  |  33<H>  ----------------------------<  112<H>  4.5   |  22  |  1.4    Ca    9.1      06 Mar 2023 06:11  Mg     2.0     03-06    TPro  5.7<L>  /  Alb  3.7  /  TBili  0.4  /  DBili  x   /  AST  26  /  ALT  33  /  AlkPhos  117<H>  03-06    PHYSICAL EXAM:  GENERAL: NAD, well-groomed, well-developed  HEAD:  Atraumatic, Normocephalic  NERVOUS SYSTEM:  Alert & Oriented X3, Good concentration;  CHEST/LUNG: Clear to percussion bilaterally; No rales, rhonchi, wheezing, or rubs  HEART: Regular rate and rhythm; No murmurs, rubs, or gallops  ABDOMEN: Soft, Nontender, Nondistended; Bowel sounds present    RADIOLOGY & ADDITIONAL TESTS:    ACC: [id number] EXAM:  ECHO TTE WO CON COMP W DOPP                          PROCEDURE DATE:  03/02/2023      INTERPRETATION:   [place] [address]                Phone: [phone].   TRANSTHORACIC ECHOCARDIOGRAM REPORT    Patient Name:   PARUL LEAHY Accession #: [id number]  Medical Rec #:  [id number]  Height:      65.0 in 165.0 cm  YOB: 1955  Weight:      174.2 lb 79.00 kg  Patient Age:    67 years   BSA:         1.86 m²  Patient Gender: M          BP:          114/69 mmHg    Date of Exam:        3/2/2023 11:50:59 AM  Referring Physician: Service  Sonographer:         Melania Jolly  Fellow:              Flakita De Anda    Reading Physician:   Kristopher Escamilla MD, [facility].    Procedure:     2D Echo/Doppler/Color Doppler Complete.  Indications:   I33.0 - Infective Endocarditis  Diagnosis:     I33.0 - Acute and subacute infective endocarditis  Study Details: Technically limited study.    Summary:   1. Left ventricular ejection fraction, by visual estimation, is 30 to   35%.   2. Multiple left ventricular regional wall motion abnormalities exist.   See wall motion findings.   3. Spectral Doppler shows impaired relaxation pattern of left   ventricular myocardial filling (Grade I diastolic dysfunction).   4. Technically difficult study. Mid and apical anteroseptal walls and   apex apears severely hypokinetic / dyskinetic. Poorly visualized   endocardial borders. 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   5. Normal left atrial size.   6. Normal right atrial size.   7. No evidence of mitral valve regurgitation.   8. LA volume Index is 15.6 ml/m² ml/m2.    PHYSICIAN INTERPRETATION:  Left Ventricle: Left ventricular ejection fraction, by visual estimation,   is 30 to 35%. Spectral Doppler shows impaired relaxation pattern of left   ventricular myocardial filling (Grade I diastolic dysfunction). Normal LV   filling pressures. Technically difficult study. Mid and apical   anteroseptal walls and apex apears severely hypokinetic / dyskinetic.   Poorly visualized endocardial borders. 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    LV Wall Scoring:  The apical septal segment, apical anterior segment, apical inferior   segment,  and apex are akinetic. The mid anteroseptal segment, mid inferoseptal   segment,  and mid inferior segment are hypokinetic. All remaining scored segments   are  normal.    Right Ventricle: The right ventricular size is normal. RV systolic   function is normal.  Left Atrium: Normal left atrial size. LA volume Index is 15.6 ml/m² ml/m2.  Right Atrium: Normal right atrial size.  Mitral Valve: No evidence of mitral valve stenosis. No evidence of mitral   valve regurgitation is seen.  Tricuspid Valve: The tricuspid valve is not well seen.  Aortic Valve: The aortic valve was not well visualized. No aortic   stenosis. No evidence of aortic valve regurgitation is seen.  Pulmonic Valve: The pulmonic valve was not well visualized.    2D AND M-MODE MEASUREMENTS (normalranges within parentheses):  Right Ventricle:  TAPSE:           3.10 cm    SPECTRAL DOPPLER ANALYSIS:  LV DIASTOLIC FUNCTION:  MV Peak E: 0.60 m/s Decel Time: 162 msec  MV Peak A: 0.84 m/s  E/A Ratio: 0.71    Aortic Valve:  AoV VMax:    1.28 m/s  AoVVTI:     0.26 m  AoV Pk Grad: 6.5 mmHg  AoV Mn Grad: 3.7 mmHg    LVOT Vmax: 1.00 m/s  LVOT VTI:  0.20 m    Mitral Valve:  MV P1/2 Time: 46.95 msec  MV Area, PHT: 4.69 cm²  [id number] Kristopher Escamilla MD, [facility], Electronically signed on 3/2/2023 at   5:08:39 PM  *** Final ***  
PARUL LEAHY  67y  Male    Subjective:  Patient is a 67y old  Male who presents with a chief complaint of AICD infection (27 Feb 2023 12:26)    INTERVAL HPI/OVERNIGHT EVENTS: Pt resting comfortably in bed this AM. Pt reports improvement in diarrhea. Otherwise, pt reports no events overnight.    Objective:  PAST MEDICAL & SURGICAL HISTORY:  Diabetes mellitus, type 2    History of MI, S/P PCI    Hypertension    Gout    Vital Signs Last 24 Hrs  T(C): 36.6 (03 Mar 2023 04:29), Max: 36.7 (02 Mar 2023 19:55)  T(F): 97.9 (03 Mar 2023 04:29), Max: 98.1 (02 Mar 2023 19:55)  HR: 69 (03 Mar 2023 04:29) (69 - 74)  BP: 110/69 (03 Mar 2023 04:29) (110/69 - 121/64)  BP(mean): --  RR: 18 (03 Mar 2023 04:29) (18 - 18)  SpO2: 97% (03 Mar 2023 07:55) (97% - 99%)    Parameters below as of 03 Mar 2023 07:55  Patient On (Oxygen Delivery Method): room air      MEDICATIONS  (STANDING):  allopurinol 100 milliGRAM(s) Oral two times a day  amLODIPine   Tablet 5 milliGRAM(s) Oral daily  aspirin  chewable 81 milliGRAM(s) Oral daily  atorvastatin 80 milliGRAM(s) Oral at bedtime  carvedilol 12.5 milliGRAM(s) Oral every 12 hours  cefTRIAXone   IVPB 2000 milliGRAM(s) IV Intermittent every 24 hours  chlorhexidine 2% Cloths 1 Application(s) Topical daily  chlorhexidine 2% Cloths 1 Application(s) Topical two times a day  dextrose 5%. 1000 milliLiter(s) (100 mL/Hr) IV Continuous <Continuous>  dextrose 5%. 1000 milliLiter(s) (50 mL/Hr) IV Continuous <Continuous>  dextrose 50% Injectable 25 Gram(s) IV Push once  dextrose 50% Injectable 12.5 Gram(s) IV Push once  dextrose 50% Injectable 25 Gram(s) IV Push once  furosemide    Tablet 40 milliGRAM(s) Oral daily  glucagon  Injectable 1 milliGRAM(s) IntraMuscular once  heparin   Injectable 5000 Unit(s) SubCutaneous every 8 hours  insulin glargine Injectable (LANTUS) 20 Unit(s) SubCutaneous at bedtime  insulin lispro (ADMELOG) corrective regimen sliding scale   SubCutaneous three times a day before meals  insulin lispro Injectable (ADMELOG) 6 Unit(s) SubCutaneous three times a day before meals  valsartan 80 milliGRAM(s) Oral daily  vancomycin  IVPB 1250 milliGRAM(s) IV Intermittent every 12 hours    MEDICATIONS  (PRN):  acetaminophen     Tablet .. 650 milliGRAM(s) Oral every 6 hours PRN Temp greater or equal to 38C (100.4F), Mild Pain (1 - 3)  aluminum hydroxide/magnesium hydroxide/simethicone Suspension 30 milliLiter(s) Oral every 4 hours PRN Dyspepsia  dextrose Oral Gel 15 Gram(s) Oral once PRN Blood Glucose LESS THAN 70 milliGRAM(s)/deciliter  melatonin 3 milliGRAM(s) Oral at bedtime PRN Insomnia  ondansetron Injectable 4 milliGRAM(s) IV Push every 8 hours PRN Nausea and/or Vomiting      LABS:                        12.0   9.17  )-----------( 228      ( 03 Mar 2023 07:36 )             35.9     03-02    139  |  107  |  35<H>  ----------------------------<  128<H>  4.1   |  20  |  1.4    Ca    8.5      02 Mar 2023 07:56  Mg     2.1     03-02    TPro  5.8<L>  /  Alb  3.6  /  TBili  0.3  /  DBili  x   /  AST  44<H>  /  ALT  39  /  AlkPhos  123<H>  03-02    PHYSICAL EXAM:  GENERAL: NAD, well-groomed, well-developed  HEAD:  Atraumatic, Normocephalic  NERVOUS SYSTEM:  Alert & Oriented X3, Good concentration;  CHEST/LUNG: Clear to percussion bilaterally; No rales, rhonchi, wheezing, or rubs  HEART: Regular rate and rhythm; No murmurs, rubs, or gallops  ABDOMEN: Soft, Nontender, Nondistended; Bowel sounds present    RADIOLOGY & ADDITIONAL TESTS:    ACC: [id number] EXAM:  ECHO TTE WO CON COMP W DOPP                          PROCEDURE DATE:  03/02/2023      INTERPRETATION:   [address] [place] [address]                Phone: [phone].   TRANSTHORACIC ECHOCARDIOGRAM REPORT    Patient Name:   PARUL LEAHY Accession #: [id number]  Medical Rec #:  [id number]  Height:      65.0 in 165.0 cm  YOB: 1955  Weight:      174.2 lb 79.00 kg  Patient Age:    67 years   BSA:         1.86 m²  Patient Gender: M          BP:          114/69 mmHg    Date of Exam:        3/2/2023 11:50:59 AM  Referring Physician: Service  Sonographer:         Melania Jolly  Fellow:              Flakita De Anda    Reading Physician:   Kristopher Escamilla MD, [facility].    Procedure:     2D Echo/Doppler/Color Doppler Complete.  Indications:   I33.0 - Infective Endocarditis  Diagnosis:     I33.0 - Acute and subacute infective endocarditis  Study Details: Technically limited study.    Summary:   1. Left ventricular ejection fraction, by visual estimation, is 30 to   35%.   2. Multiple left ventricular regional wall motion abnormalities exist.   See wall motion findings.   3. Spectral Doppler shows impaired relaxation pattern of left   ventricular myocardial filling (Grade I diastolic dysfunction).   4. Technically difficult study. Mid and apical anteroseptal walls and   apex apears severely hypokinetic / dyskinetic. Poorly visualized   endocardial borders. 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   5. Normal left atrial size.   6. Normal right atrial size.   7. No evidence of mitral valve regurgitation.   8. LA volume Index is 15.6 ml/m² ml/m2.    PHYSICIAN INTERPRETATION:  Left Ventricle: Left ventricular ejection fraction, by visual estimation,   is 30 to 35%. Spectral Doppler shows impaired relaxation pattern of left   ventricular myocardial filling (Grade I diastolic dysfunction). Normal LV   filling pressures. Technically difficult study. Mid and apical   anteroseptal walls and apex apears severely hypokinetic / dyskinetic.   Poorly visualized endocardial borders. 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    LV Wall Scoring:  The apical septal segment, apical anterior segment, apical inferior   segment,  and apex are akinetic. The mid anteroseptal segment, mid inferoseptal   segment,  and mid inferior segment are hypokinetic. All remaining scored segments   are  normal.    Right Ventricle: The right ventricular size is normal. RV systolic   function is normal.  Left Atrium: Normal left atrial size. LA volume Index is 15.6 ml/m² ml/m2.  Right Atrium: Normal right atrial size.  Mitral Valve: No evidence of mitral valve stenosis. No evidence of mitral   valve regurgitation is seen.  Tricuspid Valve: The tricuspid valve is not well seen.  Aortic Valve: The aortic valve was not well visualized. No aortic   stenosis. No evidence of aortic valve regurgitation is seen.  Pulmonic Valve: The pulmonic valve was not well visualized.    2D AND M-MODE MEASUREMENTS (normalranges within parentheses):  Right Ventricle:  TAPSE:           3.10 cm    SPECTRAL DOPPLER ANALYSIS:  LV DIASTOLIC FUNCTION:  MV Peak E: 0.60 m/s Decel Time: 162 msec  MV Peak A: 0.84 m/s  E/A Ratio: 0.71    Aortic Valve:  AoV VMax:    1.28 m/s  AoVVTI:     0.26 m  AoV Pk Grad: 6.5 mmHg  AoV Mn Grad: 3.7 mmHg    LVOT Vmax: 1.00 m/s  LVOT VTI:  0.20 m    Mitral Valve:  MV P1/2 Time: 46.95 msec  MV Area, PHT: 4.69 cm²  [id number] Kristopher Escamilla MD, FACC, Electronically signed on 3/2/2023 at   5:08:39 PM  *** Final ***

## 2023-03-08 NOTE — MEDICAL STUDENT PROGRESS NOTE(EDUCATION) - NS MD HP STUD ASPLAN PLAN FT
Abscess in AICD pocket  - CT Chest - Collection adjacent to ICD within the left chest subcutaneous tissues measuring 2.6x 2.3 x 17 cm.  - ECG on Admission: Sinus with 1st degree block  - ICD removal and washout of pocket 2/27  -Per ID consult, c/w Vancomycin & Ceftriaxone  - TTE R/O endocarditis    #DM  -Monitor FS  -on ISS    #HFrEF  #HTN, HLD  -c/w DAPT  -c/w amlodipine, carvedilol, lasix, valsartan  c/w atorvastatin    #hx Gout  - c/w home allopurinol    #DVT: Heparin sq prof  #Diet: Dash  #activity : as tolerated  #Dispo: From home  #Code: Full
Abscess in AICD pocket  - CT Chest - Collection adjacent to ICD within the left chest subcutaneous tissues measuring 2.6x 2.3 x 17 cm.  - ECG on Admission: Sinus with 1st degree block  - ICD removal and washout of pocket 2/27  -Vanco + rocephin for now, wound culture from site pending  -ID consult after culture results  - TTE R/O endocarditis    #DM  -Monitor FS  -on ISS    #HFrEF  #HTN, HLD  -c/w DAPT  -c/w amlodipine, carvedilol, lasix, valsartan  c/w atorvastatin    #hx Gout  - c/w home allopurinol    #DVT: Heparin sq prof  #Diet: Dash  #activity : as tolerated  #Dispo: From home  #Code: Full
Abscess in AICD pocket  - CT Chest - Collection adjacent to ICD within the left chest subcutaneous tissues measuring 2.6x 2.3 x 17 cm.  - ECG on Admission: Sinus with 1st degree block  - ICD removal and washout of pocket 2/27  -Wound cultures & sensitivities resulted   - EP f/u    #DM  -A1C 7.1  -Monitor FS  -on ISS    #HFrEF  - Per echo, Left ventricular ejection fraction, by visual estimation, is 30 to 35%. Grade I diastolic dysfunction. Normal LV filling pressures. Mid and apical   anteroseptal walls and apex apears severely hypokinetic / dyskinetic. Poorly visualized endocardial borders. 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    #HTN, HLD  -c/w DAPT  -c/w amlodipine, carvedilol, lasix, valsartan  c/w atorvastatin    #hx Gout  - c/w home allopurinol    #DVT: Heparin sq prof  #Diet: Dash  #activity : as tolerated  #Dispo: From home  #Code: Full
Abscess in AICD pocket  - CT Chest - Collection adjacent to ICD within the left chest subcutaneous tissues measuring 2.6x 2.3 x 17 cm.  - ECG on Admission: Sinus with 1st degree block  - ICD removal and washout of pocket 2/27  -Wound cultures & sensitivities resulted   -Per ID, Start PO linezolid  - EP f/u    #DM  -A1C 7.1  -Monitor FS  -on ISS    #HFrEF  - Per echo, Left ventricular ejection fraction, by visual estimation, is 30 to 35%. Grade I diastolic dysfunction. Normal LV filling pressures. Mid and apical   anteroseptal walls and apex apears severely hypokinetic / dyskinetic. Poorly visualized endocardial borders. 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    #HTN, HLD  -c/w DAPT  -c/w amlodipine, carvedilol, lasix, valsartan  c/w atorvastatin    #hx Gout  - c/w home allopurinol    #DVT: Heparin sq prof  #Diet: Dash  #activity : as tolerated  #Dispo: From home  #Code: Full
Abscess in AICD pocket  - CT Chest - Collection adjacent to ICD within the left chest subcutaneous tissues measuring 2.6x 2.3 x 17 cm.  - ECG on Admission: Sinus with 1st degree block  - ICD removal and washout of pocket 2/27  -Wound cultures & sensitivities resulted   -c/w PO Linezolid  -AICD Re-implantation 3/7  -Device interrogation this AM good. Per EP, Pt clear for discharge    #DM  -A1C 7.1  -Monitor FS  -on ISS    #HFrEF  - Per echo, Left ventricular ejection fraction, by visual estimation, is 30 to 35%. Grade I diastolic dysfunction. Normal LV filling pressures. Mid and apical   anteroseptal walls and apex apears severely hypokinetic / dyskinetic. Poorly visualized endocardial borders. 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    #HTN, HLD  -c/w DAPT  -c/w amlodipine, carvedilol, lasix, valsartan  c/w atorvastatin    #hx Gout  - c/w home allopurinol    #DVT: Heparin sq prof  #Diet: Dash  #activity : as tolerated  #Dispo: From home  #Code: Full
Abscess in AICD pocket  - CT Chest - Collection adjacent to ICD within the left chest subcutaneous tissues measuring 2.6x 2.3 x 17 cm.  - ECG on Admission: Sinus with 1st degree block  - ICD removal and washout of pocket 2/27  -Wound cultures & sensitivities resulted   -c/w PO Linezolid  - EP for device re-insertion today 3/7  -Pt NPO since midnight, hold anti-coagulation    #DM  -A1C 7.1  -Monitor FS  -on ISS    #HFrEF  - Per echo, Left ventricular ejection fraction, by visual estimation, is 30 to 35%. Grade I diastolic dysfunction. Normal LV filling pressures. Mid and apical   anteroseptal walls and apex apears severely hypokinetic / dyskinetic. Poorly visualized endocardial borders. 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    #HTN, HLD  -c/w DAPT  -c/w amlodipine, carvedilol, lasix, valsartan  c/w atorvastatin    #hx Gout  - c/w home allopurinol    #DVT: Heparin sq prof  #Diet: Dash  #activity : as tolerated  #Dispo: From home  #Code: Full
Abscess in AICD pocket  -CT Chest - Collection adjacent to ICD within the lef chest subcutaneous tissues measuring 2.6x 2.3 x 17 cm.  - ECG on Admission: Sinus with 1st degree blck  - ICD removal and washout of pocket 2/27  -Vanco + rocephin for now, wound culture from site pending  -ID consult after culture results  - TTE R/O endocarditis    #DM  -Monitor FS  -on ISS    #HFrEF  #HTN, HLD  -c/w DAPT  -c/w amlodipine, carvedilol, lasix, valsartan  c/w atorvastatin    #hx Gout  - c/w home allopurinol    #DVT: Heparin sq prof  #Diet: Dash  #activity : as tolerated  #Dispo: From home  #Code: Full

## 2023-03-19 DIAGNOSIS — I25.10 ATHEROSCLEROTIC HEART DISEASE OF NATIVE CORONARY ARTERY WITHOUT ANGINA PECTORIS: ICD-10-CM

## 2023-03-19 DIAGNOSIS — M10.9 GOUT, UNSPECIFIED: ICD-10-CM

## 2023-03-19 DIAGNOSIS — I50.22 CHRONIC SYSTOLIC (CONGESTIVE) HEART FAILURE: ICD-10-CM

## 2023-03-19 DIAGNOSIS — Z79.4 LONG TERM (CURRENT) USE OF INSULIN: ICD-10-CM

## 2023-03-19 DIAGNOSIS — T82.7XXA INFECTION AND INFLAMMATORY REACTION DUE TO OTHER CARDIAC AND VASCULAR DEVICES, IMPLANTS AND GRAFTS, INITIAL ENCOUNTER: ICD-10-CM

## 2023-03-19 DIAGNOSIS — I13.0 HYPERTENSIVE HEART AND CHRONIC KIDNEY DISEASE WITH HEART FAILURE AND STAGE 1 THROUGH STAGE 4 CHRONIC KIDNEY DISEASE, OR UNSPECIFIED CHRONIC KIDNEY DISEASE: ICD-10-CM
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
## 2023-03-19 DIAGNOSIS — Z95.810 PRESENCE OF AUTOMATIC (IMPLANTABLE) CARDIAC DEFIBRILLATOR: ICD-10-CM

## 2023-03-19 DIAGNOSIS — E78.5 HYPERLIPIDEMIA, UNSPECIFIED: ICD-10-CM

## 2023-03-19 DIAGNOSIS — L02.213 CUTANEOUS ABSCESS OF CHEST WALL: ICD-10-CM

## 2023-03-19 DIAGNOSIS — U07.1 COVID-19: ICD-10-CM

## 2023-03-19 DIAGNOSIS — E11.22 TYPE 2 DIABETES MELLITUS WITH DIABETIC CHRONIC KIDNEY DISEASE: ICD-10-CM

## 2023-03-19 DIAGNOSIS — I42.9 CARDIOMYOPATHY, UNSPECIFIED: ICD-10-CM
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
## 2023-03-19 DIAGNOSIS — N18.30 CHRONIC KIDNEY DISEASE, STAGE 3 UNSPECIFIED: ICD-10-CM

## 2023-03-19 DIAGNOSIS — Z95.5 PRESENCE OF CORONARY ANGIOPLASTY IMPLANT AND GRAFT: ICD-10-CM

## 2023-03-19 DIAGNOSIS — I25.2 OLD MYOCARDIAL INFARCTION: ICD-10-CM

## 2023-03-19 DIAGNOSIS — E66.3 OVERWEIGHT: ICD-10-CM

## 2023-04-10 ENCOUNTER — APPOINTMENT (OUTPATIENT)
Dept: ELECTROPHYSIOLOGY | Facility: CLINIC | Age: 68
End: 2023-04-10
Payer: MEDICARE

## 2023-04-10 VITALS
RESPIRATION RATE: 16 BRPM | DIASTOLIC BLOOD PRESSURE: 68 MMHG | HEART RATE: 63 BPM | SYSTOLIC BLOOD PRESSURE: 104 MMHG | TEMPERATURE: 97.1 F | BODY MASS INDEX: 28.32 KG/M2 | HEIGHT: 65 IN | WEIGHT: 170 LBS

## 2023-04-10 PROCEDURE — 99024 POSTOP FOLLOW-UP VISIT: CPT

## 2023-04-10 PROCEDURE — 93000 ELECTROCARDIOGRAM COMPLETE: CPT | Mod: 59

## 2023-04-10 PROCEDURE — 93282 PRGRMG EVAL IMPLANTABLE DFB: CPT

## 2023-04-10 RX ORDER — GLIMEPIRIDE 1 MG/1
1 TABLET ORAL
Qty: 90 | Refills: 0 | Status: DISCONTINUED | COMMUNITY
Start: 2019-08-06 | End: 2023-04-10

## 2023-04-10 RX ORDER — ASPIRIN 81 MG/1
81 TABLET, CHEWABLE ORAL
Qty: 90 | Refills: 0 | Status: ACTIVE | COMMUNITY
Start: 2019-08-06

## 2023-04-10 RX ORDER — BLOOD SUGAR DIAGNOSTIC
STRIP MISCELLANEOUS
Qty: 50 | Refills: 0 | Status: ACTIVE | COMMUNITY
Start: 2019-03-13

## 2023-04-10 RX ORDER — VALSARTAN 80 MG/1
80 TABLET, COATED ORAL
Qty: 90 | Refills: 0 | Status: ACTIVE | COMMUNITY
Start: 2019-08-13

## 2023-04-10 RX ORDER — FUROSEMIDE 40 MG/1
40 TABLET ORAL
Qty: 90 | Refills: 0 | Status: ACTIVE | COMMUNITY
Start: 2019-08-06

## 2023-04-10 RX ORDER — FLUTICASONE PROPIONATE 50 UG/1
50 SPRAY, METERED NASAL
Qty: 16 | Refills: 0 | Status: ACTIVE | COMMUNITY
Start: 2019-09-10

## 2023-04-10 RX ORDER — TICAGRELOR 90 MG/1
90 TABLET ORAL
Qty: 180 | Refills: 0 | Status: COMPLETED | COMMUNITY
Start: 2019-08-06 | End: 2023-04-10

## 2023-04-10 RX ORDER — TICAGRELOR 60 MG/1
60 TABLET ORAL TWICE DAILY
Refills: 0 | Status: ACTIVE | COMMUNITY

## 2023-04-10 RX ORDER — ATORVASTATIN CALCIUM 80 MG/1
80 TABLET, FILM COATED ORAL
Qty: 90 | Refills: 0 | Status: ACTIVE | COMMUNITY
Start: 2019-08-06

## 2023-04-10 RX ORDER — EZETIMIBE 10 MG/1
10 TABLET ORAL
Qty: 90 | Refills: 0 | Status: ACTIVE | COMMUNITY
Start: 2019-08-28

## 2023-04-10 RX ORDER — ASPIRIN 81 MG/1
81 TABLET, CHEWABLE ORAL
Refills: 0 | Status: ACTIVE | COMMUNITY

## 2023-04-10 NOTE — ASSESSMENT
[FreeTextEntry1] : 67 Y.O. Male, with a PMHx of CAD s/p PCI of the LAD with late presentation MI and reduced LVEF, HTN, HLD, and DM, s/p AICD extraction due to pocket infection and new SC-ICD implant (3/7/2023)\par \par # SC-ICD\par - Incision site healing well, clean, dry, no drainage, no redness, no bruising. I discussed with patient post-operative care in great details. I answered all questions to their satisfaction. Patient was pleased with the result of their procedure. Advised pt to avoid carrying more than 5 lb and lifting his left arm above his shoulder for a total of 6 weeks from procedure date. Also advised pt to avoid fishing, swimming and golfing for a total of 3 mo to avoid lead dislodgment.\par - Device function normal. All parameters stable. Optivol below threshold. No events. See Device Printout\par - Remote monitoring was discussed with patient, schedule, process, as well as associated co-pay that may not be covered by their insurance.\par \par \par # HTN\par - BP at goal\par - Continue amlodipine 10mg QD, valsartan 80mg QD\par - low sodium/sat fat diet encouraged\par \par RTO 4-6 months/PRN\par \par I have also advised the patient to go to the nearest emergency room if he experiences any chest pain, dyspnea, syncope, or has any other compelling symptoms.\par

## 2023-04-10 NOTE — PROCEDURE
[NSR] : normal sinus rhythm [See Device Printout] : See device printout [ICD] : Implantable cardioverter-defibrillator [VVI] : VVI [Charge Time: ___ sec] : charge time was [unfilled] seconds [Lead Imp:  ___ohms] : lead impedance was [unfilled] ohms [Sensing Amplitude ___mv] : sensing amplitude was [unfilled] mv [___V @] : [unfilled] V [___ ms] : [unfilled] ms [None] : none [Counters Reset] : the counters were reset [de-identified] : [place] [de-identified] : RESONATE EL ICD D432 [de-identified] : [id number] [de-identified] : 03/07/2023 [de-identified] : 41 [de-identified] : 14.5 years [de-identified] : No events

## 2023-04-10 NOTE — PHYSICAL EXAM
[General Appearance - Well Developed] : well developed [Normal Appearance] : normal appearance [Well Groomed] : well groomed [General Appearance - Well Nourished] : well nourished [No Deformities] : no deformities [General Appearance - In No Acute Distress] : no acute distress [Heart Rate And Rhythm] : heart rate and rhythm were normal [] : no respiratory distress [Heart Sounds] : normal S1 and S2 [Exaggerated Use Of Accessory Muscles For Inspiration] : no accessory muscle use [Right Infraclavicular] : right infraclavicular area [Clean] : clean [Dry] : dry [Healing Well] : healing well [FreeTextEntry2] : Stitches were cut from the abscess incision -clean, dry, no drainage, no tenderness

## 2023-04-10 NOTE — HISTORY OF PRESENT ILLNESS
[de-identified] : 67 Y.O. Male, with a PMHx of CAD s/p PCI of the LAD with late presentation MI and reduced LVEF, HTN, HLD, and DM, s/p AICD extraction due to pocket infection and new ICD implant (3/7/2023)

## 2023-04-20 ENCOUNTER — APPOINTMENT (OUTPATIENT)
Dept: CARDIOLOGY | Facility: CLINIC | Age: 68
End: 2023-04-20
Payer: MEDICARE

## 2023-04-20 VITALS
BODY MASS INDEX: 28.66 KG/M2 | OXYGEN SATURATION: 97 % | DIASTOLIC BLOOD PRESSURE: 73 MMHG | HEART RATE: 61 BPM | WEIGHT: 172 LBS | HEIGHT: 65 IN | SYSTOLIC BLOOD PRESSURE: 108 MMHG

## 2023-04-20 PROCEDURE — 99214 OFFICE O/P EST MOD 30 MIN: CPT

## 2023-04-20 RX ORDER — AMLODIPINE BESYLATE 10 MG/1
10 TABLET ORAL
Qty: 30 | Refills: 0 | Status: DISCONTINUED | COMMUNITY
Start: 2019-03-13 | End: 2023-04-20

## 2023-04-21 NOTE — DISCUSSION/SUMMARY
[FreeTextEntry1] : CAD: The impression is coronary artery disease. Currently, the condition is stable. There are no changes in medication management. Continue current medical therapy. Other planned treatment includes dietary modification, an exercise regimen, and weight reduction.\par \par CHF: The impression is congestive heart failure. Currently, the condition is stable. NYHA Class II-III. Recommend cardiac rehab; however, patient declines at this time. There are no changes in medication management. Continue current medical therapy.\par \par HTN: The impression is hypertension. Currently, the condition is controlled. There are no changes in medication management. Amlodipine was recently decreased from 10 mg to 5 mg. Continue current medical therapy. Other planned treatments include an exercise regimen, weight loss, low sodium diet, and alcohol moderation.\par \par HLD: The impression is hyperlipidemia. Currently, the condition is stable. There are no changes in medication management. Continue current medical therapy. Other planned treatment includes diet modification, exercise, and weight loss.\par \par DM: The impression is diabetes. There are no changes in medication management. Patient advised to continue taking medications as prescribed, closely monitor blood sugar at home, follow a diabetic diet, and follow up for continued monitoring.\par \par Instructed to follow up in 4-6 months. \par Plan was discussed with the patient.\par

## 2023-04-21 NOTE — HISTORY OF PRESENT ILLNESS
[FreeTextEntry1] : PARUL LEAHY is a 67 year-old male, with a PMHx significant for CAD s/p PCI of the LAD with late presentation MI and reduced LVEF, s/p AICD, HTN, HLD, and DM, who presents today for a follow-up visit. Patient recently had a device abscess s/p removal of AICD at [facility]. AICD on right upper chest wall. Now reports he is feeling well. Recent interrogation revealed properly functioning device. Denies any other complaints. EF was 30-35% on recent echo performed in March. \par

## 2023-04-27 DIAGNOSIS — M10.9 GOUT, UNSPECIFIED: ICD-10-CM

## 2023-04-27 RX ORDER — CARVEDILOL 12.5 MG/1
12.5 TABLET, FILM COATED ORAL TWICE DAILY
Qty: 180 | Refills: 3 | Status: ACTIVE | COMMUNITY
Start: 2019-08-06

## 2023-04-27 RX ORDER — AMLODIPINE BESYLATE 5 MG/1
5 TABLET ORAL
Refills: 0 | Status: ACTIVE | COMMUNITY
Start: 2023-04-27

## 2023-04-27 RX ORDER — ALLOPURINOL 100 MG/1
100 TABLET ORAL TWICE DAILY
Refills: 0 | Status: ACTIVE | COMMUNITY

## 2023-04-27 RX ORDER — POLYETHYLENE GLYCOL-3350 AND ELECTROLYTES 236; 6.74; 5.86; 2.97; 22.74 G/274.31G; G/274.31G; G/274.31G; G/274.31G; G/274.31G
236 POWDER, FOR SOLUTION ORAL
Qty: 4000 | Refills: 0 | Status: DISCONTINUED | COMMUNITY
Start: 2019-12-19 | End: 2023-04-27

## 2023-04-27 RX ORDER — MENTHOL/CAMPHOR/PHENOL
3 CREAM (GRAM) TOPICAL
Qty: 473 | Refills: 0 | Status: DISCONTINUED | COMMUNITY
Start: 2019-09-10 | End: 2023-04-27

## 2023-04-27 RX ORDER — LISINOPRIL 10 MG/1
10 TABLET ORAL
Qty: 90 | Refills: 0 | Status: DISCONTINUED | COMMUNITY
Start: 2019-08-06 | End: 2023-04-27

## 2023-04-27 RX ORDER — METFORMIN HYDROCHLORIDE 500 MG/1
500 TABLET, COATED ORAL
Qty: 180 | Refills: 0 | Status: DISCONTINUED | COMMUNITY
Start: 2019-08-06 | End: 2023-04-27

## 2023-04-27 RX ORDER — CIPROFLOXACIN HYDROCHLORIDE 500 MG/1
500 TABLET, FILM COATED ORAL
Qty: 10 | Refills: 0 | Status: DISCONTINUED | COMMUNITY
Start: 2020-01-30 | End: 2023-04-27

## 2023-06-02 ENCOUNTER — APPOINTMENT (OUTPATIENT)
Dept: ELECTROPHYSIOLOGY | Facility: CLINIC | Age: 68
End: 2023-06-02

## 2023-07-06 ENCOUNTER — APPOINTMENT (OUTPATIENT)
Dept: UROLOGY | Facility: CLINIC | Age: 68
End: 2023-07-06
Payer: MEDICARE

## 2023-07-06 PROCEDURE — 99448 NTRPROF PH1/NTRNET/EHR 21-30: CPT

## 2023-07-06 NOTE — ASSESSMENT
[FreeTextEntry1] : 1. elevated PSA --   s/p neg prostate biopsy 2/2020\par 2. BPH \par 3. Large left hydrocele, mod right hydrocele\par \par Plan:\par -Re-discussed and interpreted PSAD  results. \par -Due to the recent abnormal PSAD results I have advised systematic TP prostate biopsy.  Alternatively I suggested a repeat MRI before deciding or embarking on a biopsy at this point in time.  In addition a third alternative would be to repeat the PSA and assess whether it continues to elevate or decline.  The patient would like to proceed with with a MP MRI prostate before deciding to proceed with prostate biopsy.\par -MP MRI prostate\par -RTO 1 month

## 2023-07-06 NOTE — REASON FOR VISIT
[Follow-up Visit ___] : a follow-up visit  for [unfilled] [Family Member] : family member Consent (Near Eyelid Margin)/Introductory Paragraph: The rationale for Mohs was explained to the patient and consent was obtained. The risks, benefits and alternatives to therapy were discussed in detail. Specifically, the risks of ectropion or eyelid deformity, infection, scarring, bleeding, prolonged wound healing, incomplete removal, allergy to anesthesia, nerve injury and recurrence were addressed. Prior to the procedure, the treatment site was clearly identified and confirmed by the patient. All components of Universal Protocol/PAUSE Rule completed.

## 2023-07-06 NOTE — HISTORY OF PRESENT ILLNESS
[Home] : at home, [unfilled] , at the time of the visit. [Other Location: e.g. Home (Enter Location, City,State)___] : at [unfilled] [Verbal consent obtained from patient] : the patient, [unfilled] [Urinary Frequency] : urinary frequency [Nocturia] : nocturia [None] : None [FreeTextEntry1] : 68 year old male presents to the office for follow up of elevated PSA, s/p Prostate Biopsy 2/18/20. \par  He reports feeling well, he denies any fever, nausea, and other constitutional symptoms. \par We attempted a  TEM, however technical difficulty did not allow all scripts to open on the Chrome book and therefore we proceeded with TTM.  Patient understood and agrees\par \par All past and present data reviewed:\par 05/2022 mpMRI= 67 gm, no suspicious nodules, BPH (PIRADS 2)  //  bilateral hydroceles.\par 02/2020 pathology= BPH, chronic inflammation.\par 02/2020 TRUS =  89 gm /no nodules // nonvasc.\par \par 3/2023 PSA = 10.9-------------- PSAD= 0.16\par 9/2022  PSA=   9.0  %free 30  PSAD-  0.13\par 03/2022 PSA=  9.3  %fpsa= 37   PSAD= 0.13 (mpMRI)\par 09/2021 PSA=  2.8\par 04/2021 PSA=  7.6  %fpsa= 26   PSAD= 0.08\par 01/2020 PSA=  9.1  %fpsa= 26 \par 08/2020 PSA=  9.2  %fpsa= 24   PSAD = .10\par \par 06/2022= BUN 31, CREAT 1.3, GFR 53\par 05/2022= BUN 26, CREAT 1.4, GFR 51\par \par \par  [Straining] : no straining [Weak Stream] : no weak stream [Hematuria - Gross] : no gross hematuria [Fever] : no fever [Nausea] : no nausea

## 2023-07-06 NOTE — PHYSICAL EXAM
[General Appearance - Well Developed] : well developed [General Appearance - Well Nourished] : well nourished [Normal Appearance] : normal appearance [Well Groomed] : well groomed [General Appearance - In No Acute Distress] : no acute distress [Abdomen Soft] : soft [Abdomen Tenderness] : non-tender [Costovertebral Angle Tenderness] : no ~M costovertebral angle tenderness [Urethral Meatus] : meatus normal [Penis Abnormality] : normal uncircumcised penis [Anus Abnormality] : the anus and perineum were normal [Rectal Exam - Rectum] : digital rectal exam was normal [Prostate Tenderness] : the prostate was not tender [No Prostate Nodules] : no prostate nodules [Prostate Size ___ gm] : prostate size [unfilled] gm [Skin Color & Pigmentation] : normal skin color and pigmentation [Edema] : no peripheral edema [] : no respiratory distress [Respiration, Rhythm And Depth] : normal respiratory rhythm and effort [Exaggerated Use Of Accessory Muscles For Inspiration] : no accessory muscle use [Oriented To Time, Place, And Person] : oriented to person, place, and time [Affect] : the affect was normal [Mood] : the mood was normal [Not Anxious] : not anxious [Normal Station and Gait] : the gait and station were normal for the patient's age [No Focal Deficits] : no focal deficits [No Palpable Adenopathy] : no palpable adenopathy [FreeTextEntry1] : large right hydrocele, very large left hydrocele- extends to ext ing ring-testis not palpable //sulcus +, smooth (from last visit)

## 2023-08-14 ENCOUNTER — APPOINTMENT (OUTPATIENT)
Dept: CARDIOLOGY | Facility: CLINIC | Age: 68
End: 2023-08-14
Payer: MEDICARE

## 2023-08-14 VITALS
HEIGHT: 65 IN | WEIGHT: 176 LBS | DIASTOLIC BLOOD PRESSURE: 59 MMHG | OXYGEN SATURATION: 95 % | BODY MASS INDEX: 29.32 KG/M2 | SYSTOLIC BLOOD PRESSURE: 95 MMHG | HEART RATE: 67 BPM

## 2023-08-14 PROCEDURE — 93000 ELECTROCARDIOGRAM COMPLETE: CPT

## 2023-08-14 PROCEDURE — 99214 OFFICE O/P EST MOD 30 MIN: CPT | Mod: 25

## 2023-08-14 NOTE — REASON FOR VISIT
[Other: ____] : [unfilled] SSKI Counseling:  I discussed with the patient the risks of SSKI including but not limited to thyroid abnormalities, metallic taste, GI upset, fever, headache, acne, arthralgias, paraesthesias, lymphadenopathy, easy bleeding, arrhythmias, and allergic reaction.

## 2023-08-15 NOTE — HISTORY OF PRESENT ILLNESS
[FreeTextEntry1] : PARUL LEAHY is a 68-year-old male, with a PMHx significant for CAD s/p PCI of the LAD with late presentation MI and reduced LVEF, s/p AICD extraction due to pocket infection, s/p new ICD implant, HTN, HLD, DM, who presents today for a follow-up visit. Denies any new complaints. Reports he is feeling well. BP has been mildly lower. Has been on Lasix QD. Otherwise: (-) chest pain, (-) SOB.

## 2023-08-15 NOTE — DISCUSSION/SUMMARY
[EKG obtained to assist in diagnosis and management of assessed problem(s)] : EKG obtained to assist in diagnosis and management of assessed problem(s) [FreeTextEntry1] : EKG performed today was unremarkable.  CAD: The impression is coronary artery disease. Currently, the condition is stable. There are no changes in medication management. Continue current medical therapy. Other planned treatment includes dietary modification, an exercise regimen, and weight reduction.  HTN: The impression is hypertension. BP today measures 95/59 mmHg. Decrease Lasix to 20 mg BID. Monitor BP at home. Continue current medical therapy. Other planned treatments include an exercise regimen, weight loss, low sodium diet, and alcohol moderation.  HLD: The impression is hyperlipidemia. Currently, the condition is stable. There are no changes in medication management. Continue current medical therapy. Other planned treatment includes diet modification, exercise, and weight loss.  DM: The impression is diabetes mellitus. There are no changes in medication management. Patient advised to continue taking medications as prescribed, closely monitor blood sugar at home, follow a diabetic diet, and follow up for continued monitoring.  Instructed to follow up in 4-5 months for routine care.  Plan was discussed with the patient.

## 2023-08-18 ENCOUNTER — NON-APPOINTMENT (OUTPATIENT)
Age: 68
End: 2023-08-18

## 2023-08-31 ENCOUNTER — APPOINTMENT (OUTPATIENT)
Dept: CARDIOLOGY | Facility: CLINIC | Age: 68
End: 2023-08-31
Payer: MEDICARE

## 2023-08-31 ENCOUNTER — NON-APPOINTMENT (OUTPATIENT)
Age: 68
End: 2023-08-31

## 2023-08-31 PROCEDURE — 93295 DEV INTERROG REMOTE 1/2/MLT: CPT

## 2023-08-31 PROCEDURE — 93296 REM INTERROG EVL PM/IDS: CPT

## 2023-09-02 ENCOUNTER — NON-APPOINTMENT (OUTPATIENT)
Age: 68
End: 2023-09-02

## 2023-09-12 ENCOUNTER — RESULT REVIEW (OUTPATIENT)
Age: 68
End: 2023-09-12

## 2023-09-12 ENCOUNTER — OUTPATIENT (OUTPATIENT)
Dept: OUTPATIENT SERVICES | Facility: HOSPITAL | Age: 68
LOS: 1 days | End: 2023-09-12
Payer: MEDICARE

## 2023-09-12 DIAGNOSIS — Z00.8 ENCOUNTER FOR OTHER GENERAL EXAMINATION: ICD-10-CM

## 2023-09-12 DIAGNOSIS — R97.20 ELEVATED PROSTATE SPECIFIC ANTIGEN [PSA]: ICD-10-CM

## 2023-09-12 PROCEDURE — 93283 PRGRMG EVAL IMPLANTABLE DFB: CPT | Mod: 26

## 2023-09-12 PROCEDURE — 72197 MRI PELVIS W/O & W/DYE: CPT

## 2023-09-12 PROCEDURE — 72197 MRI PELVIS W/O & W/DYE: CPT | Mod: 26

## 2023-09-12 PROCEDURE — A9579: CPT

## 2023-09-12 PROCEDURE — 93283 PRGRMG EVAL IMPLANTABLE DFB: CPT

## 2023-09-13 DIAGNOSIS — R97.20 ELEVATED PROSTATE SPECIFIC ANTIGEN [PSA]: ICD-10-CM

## 2023-10-02 RX ORDER — TIRZEPATIDE 5 MG/.5ML
5 INJECTION, SOLUTION SUBCUTANEOUS WEEKLY
Refills: 0 | Status: ACTIVE | COMMUNITY
Start: 2023-10-02

## 2023-10-02 RX ORDER — DAPAGLIFLOZIN 5 MG/1
5 TABLET, FILM COATED ORAL DAILY
Refills: 0 | Status: ACTIVE | COMMUNITY
Start: 2023-10-02

## 2023-10-02 RX ORDER — DULAGLUTIDE 1.5 MG/.5ML
1.5 INJECTION, SOLUTION SUBCUTANEOUS
Refills: 0 | Status: DISCONTINUED | COMMUNITY
End: 2023-10-02

## 2023-11-29 NOTE — BRIEF OPERATIVE NOTE - DISPOSITION
Medication: Mounjaro 12.5mg  Last office visit: 04/27/23  Next office visit: 01/12/24  Last refill: 05/26/23    Per provider's office visit note from 04/27/23:  Mounjaro 10 mg weekly (on Thursdays). Recently increased from 7.5 mg.  PLAN  1. Continue Mounjaro 10 mg weekly, patient to call after 4th dose, will review BGs and consider increasing to 12.5 mg weekly    Per telephone encounter from 05/05/23:  Maryann Perez PA-C  to [facility] Nurse Msg Pool    5/5/23 12:28 PM  Note        Once he has been on the 10 mg for 4 weeks, he can increase to 12.5 mg weekly. If he has low blood sugars after the increase in dose, he should call and we will reduce the insulin.          No telephone encounters since 05/05/23 where Mounjaro dose was increased to 12.5mg, where patient has called with blood sugar concerns.     Refill sent per protocol.    PACU, THEN TELEMETRY

## 2023-11-30 ENCOUNTER — APPOINTMENT (OUTPATIENT)
Dept: UROLOGY | Facility: CLINIC | Age: 68
End: 2023-11-30
Payer: MEDICARE

## 2023-11-30 ENCOUNTER — APPOINTMENT (OUTPATIENT)
Dept: CARDIOLOGY | Facility: CLINIC | Age: 68
End: 2023-11-30
Payer: MEDICARE

## 2023-11-30 ENCOUNTER — NON-APPOINTMENT (OUTPATIENT)
Age: 68
End: 2023-11-30

## 2023-11-30 VITALS
BODY MASS INDEX: 29.32 KG/M2 | SYSTOLIC BLOOD PRESSURE: 103 MMHG | DIASTOLIC BLOOD PRESSURE: 61 MMHG | OXYGEN SATURATION: 98 % | WEIGHT: 176 LBS | HEIGHT: 65 IN | RESPIRATION RATE: 16 BRPM | HEART RATE: 68 BPM | TEMPERATURE: 97.3 F

## 2023-11-30 PROCEDURE — 99213 OFFICE O/P EST LOW 20 MIN: CPT

## 2023-11-30 PROCEDURE — 93295 DEV INTERROG REMOTE 1/2/MLT: CPT

## 2023-11-30 PROCEDURE — 93296 REM INTERROG EVL PM/IDS: CPT

## 2024-01-08 ENCOUNTER — APPOINTMENT (OUTPATIENT)
Dept: ELECTROPHYSIOLOGY | Facility: CLINIC | Age: 69
End: 2024-01-08
Payer: MEDICARE

## 2024-01-08 VITALS
BODY MASS INDEX: 29.32 KG/M2 | DIASTOLIC BLOOD PRESSURE: 71 MMHG | HEIGHT: 65 IN | SYSTOLIC BLOOD PRESSURE: 107 MMHG | WEIGHT: 176 LBS

## 2024-01-08 DIAGNOSIS — Z45.02 ENCOUNTER FOR ADJUSTMENT AND MANAGEMENT OF AUTOMATIC IMPLANTABLE CARDIAC DEFIBRILLATOR: ICD-10-CM

## 2024-01-08 DIAGNOSIS — I21.02 ST ELEVATION (STEMI) MYOCARDIAL INFARCTION INVOLVING LEFT ANTERIOR DESCENDING CORONARY ARTERY: ICD-10-CM

## 2024-01-08 PROCEDURE — 99214 OFFICE O/P EST MOD 30 MIN: CPT | Mod: 25

## 2024-01-08 PROCEDURE — 93282 PRGRMG EVAL IMPLANTABLE DFB: CPT

## 2024-01-08 NOTE — HISTORY OF PRESENT ILLNESS
[de-identified] : 67 Y.O. Male, with a PMHx of CAD s/p PCI of the LAD with late presentation MI and reduced LVEF, HTN, HLD, and DM, s/p AICD extraction due to pocket infection and new ICD implant (3/7/2023)

## 2024-01-08 NOTE — PHYSICAL EXAM
[General Appearance - Well Developed] : well developed [Normal Appearance] : normal appearance [Well Groomed] : well groomed [General Appearance - Well Nourished] : well nourished [No Deformities] : no deformities [General Appearance - In No Acute Distress] : no acute distress [Heart Rate And Rhythm] : heart rate and rhythm were normal [Heart Sounds] : normal S1 and S2 [] : no respiratory distress [Exaggerated Use Of Accessory Muscles For Inspiration] : no accessory muscle use [Right Infraclavicular] : right infraclavicular area [Clean] : clean [Dry] : dry [Healing Well] : healing well

## 2024-01-08 NOTE — ASSESSMENT
[FreeTextEntry1] : 68M with a PMHx of CAD s/p PCI of the LAD with late presentation MI and reduced LVEF, HTN, HLD, and DM, s/p AICD extraction due to pocket infection and new SC-ICD implant (3/7/2023).  Today the patient is feeling generally well with no acute complaints. Denies CP, SOB, palpitations, dizziness, syncope.  # SC-ICD - Incision site well healed, clean, dry, no drainage, no redness, no bruising. - Device function normal. All parameters stable. Heart Index 0. No events. See Device Printout - Continue remote monitoring  # HTN - BP at goal - Continue amlodipine 10mg QD, valsartan 80mg QD - low sodium/sat fat diet encouraged  I have also advised the patient to go to the nearest emergency room if he experiences any chest pain, dyspnea, syncope, or has any other compelling symptoms.  RTO 7-9 months/PRN

## 2024-01-08 NOTE — CARDIOLOGY SUMMARY
[de-identified] : 04/10/2023: SR, 1st degree AV block   HR 63bpm [de-identified] : 12/20/2022 EST: no significant ST changes, no ischemia [de-identified] : 03/07/2023: Single Chamber ICD ([place]) implanted

## 2024-01-08 NOTE — PROCEDURE
[NSR] : normal sinus rhythm [See Device Printout] : See device printout [ICD] : Implantable cardioverter-defibrillator [VVI] : VVI [Charge Time: ___ sec] : charge time was [unfilled] seconds [Lead Imp:  ___ohms] : lead impedance was [unfilled] ohms [Sensing Amplitude ___mv] : sensing amplitude was [unfilled] mv [___V @] : [unfilled] V [___ ms] : [unfilled] ms [None] : none [Counters Reset] : the counters were reset [de-identified] : [place] [de-identified] : RESONATE EL ICD D432 [de-identified] : [id number] [de-identified] : 03/07/2023 [de-identified] : 41 [de-identified] : 14 years [de-identified] : No events  <1%

## 2024-01-22 ENCOUNTER — APPOINTMENT (OUTPATIENT)
Dept: CARDIOLOGY | Facility: CLINIC | Age: 69
End: 2024-01-22
Payer: MEDICARE

## 2024-01-22 VITALS
DIASTOLIC BLOOD PRESSURE: 60 MMHG | SYSTOLIC BLOOD PRESSURE: 100 MMHG | OXYGEN SATURATION: 95 % | HEART RATE: 58 BPM | WEIGHT: 178 LBS | BODY MASS INDEX: 29.66 KG/M2 | HEIGHT: 65 IN | RESPIRATION RATE: 16 BRPM

## 2024-01-22 DIAGNOSIS — E78.5 HYPERLIPIDEMIA, UNSPECIFIED: ICD-10-CM

## 2024-01-22 DIAGNOSIS — E11.9 TYPE 2 DIABETES MELLITUS W/OUT COMPLICATIONS: ICD-10-CM

## 2024-01-22 DIAGNOSIS — I25.10 ATHEROSCLEROTIC HEART DISEASE OF NATIVE CORONARY ARTERY W/OUT ANGINA PECTORIS: ICD-10-CM

## 2024-01-22 DIAGNOSIS — I10 ESSENTIAL (PRIMARY) HYPERTENSION: ICD-10-CM

## 2024-01-22 PROCEDURE — 99214 OFFICE O/P EST MOD 30 MIN: CPT

## 2024-01-22 NOTE — PHYSICAL EXAM
[Well Developed] : well developed [No Acute Distress] : no acute distress [Well Nourished] : well nourished [Normal Conjunctiva] : normal conjunctiva [Normal Venous Pressure] : normal venous pressure [No Carotid Bruit] : no carotid bruit [Normal S1, S2] : normal S1, S2 [No Murmur] : no murmur [No Rub] : no rub [No Gallop] : no gallop [Clear Lung Fields] : clear lung fields [Good Air Entry] : good air entry [No Respiratory Distress] : no respiratory distress  [Non Tender] : non-tender [Soft] : abdomen soft [No Masses/organomegaly] : no masses/organomegaly [Normal Bowel Sounds] : normal bowel sounds [Normal Gait] : normal gait [No Edema] : no edema [No Cyanosis] : no cyanosis [No Clubbing] : no clubbing [No Varicosities] : no varicosities [No Rash] : no rash [Moves all extremities] : moves all extremities [No Skin Lesions] : no skin lesions [No Focal Deficits] : no focal deficits [Normal Speech] : normal speech [Alert and Oriented] : alert and oriented [Normal memory] : normal memory

## 2024-01-23 NOTE — DISCUSSION/SUMMARY
[FreeTextEntry1] : CAD: The impression is coronary artery disease. Currently, the condition is stable. There are no changes in medication management. Continue current medical therapy. Other planned treatment includes dietary modification, an exercise regimen, and weight reduction.  HTN: The impression is hypertension. Currently, the condition is controlled. There are no changes in medication management. Continue current medical therapy. Other planned treatments include an exercise regimen, weight loss, low sodium diet, and alcohol moderation.  HLD: The impression is hyperlipidemia. Currently, the condition is stable. There are no changes in medication management. Continue current medical therapy. Other planned treatment includes diet modification, exercise, and weight loss.  DM: The impression is diabetes mellitus. There are no changes in medication management. Patient advised to continue taking medications as prescribed, closely monitor blood sugar at home, follow a diabetic diet, and follow up for continued monitoring.  Instructed to follow up in 6 months.  Plan was discussed with the patient.

## 2024-01-23 NOTE — HISTORY OF PRESENT ILLNESS
[FreeTextEntry1] : PARUL LEAHY is a 68-year-old male, with a PMHx significant for CAD s/p PCI of the LAD with late presentation MI and reduced LVEF, s/p AICD extraction due to pocket infection, s/p new ICD implant, HTN, HLD, DM, who presents today for a follow-up visit. Denies any new complaints. Reports he is feeling well. Otherwise: (-) chest pain, (-) SOB.   Labs from 09/09/2023 reviewed:  Hgb A1c: 6.7 ------------------------ Total Cholesterol: 111 HDL: 48 Triglycerides: 87 LDL: 46 Chol/HDLC Ratio: 2.3 Non-HDL-C: 63

## 2024-02-16 ENCOUNTER — APPOINTMENT (OUTPATIENT)
Dept: CARDIOLOGY | Facility: CLINIC | Age: 69
End: 2024-02-16
Payer: MEDICARE

## 2024-02-16 PROCEDURE — 93306 TTE W/DOPPLER COMPLETE: CPT

## 2024-04-08 ENCOUNTER — NON-APPOINTMENT (OUTPATIENT)
Age: 69
End: 2024-04-08

## 2024-04-09 ENCOUNTER — APPOINTMENT (OUTPATIENT)
Dept: CARDIOLOGY | Facility: CLINIC | Age: 69
End: 2024-04-09
Payer: MEDICARE

## 2024-04-09 PROCEDURE — 93296 REM INTERROG EVL PM/IDS: CPT

## 2024-04-09 PROCEDURE — 93295 DEV INTERROG REMOTE 1/2/MLT: CPT

## 2024-06-04 ENCOUNTER — APPOINTMENT (OUTPATIENT)
Dept: UROLOGY | Facility: CLINIC | Age: 69
End: 2024-06-04
Payer: MEDICARE

## 2024-06-04 DIAGNOSIS — N40.1 BENIGN PROSTATIC HYPERPLASIA WITH LOWER URINARY TRACT SYMPMS: ICD-10-CM

## 2024-06-04 DIAGNOSIS — R97.20 ELEVATED PROSTATE, SPECIFIC ANTIGEN [PSA]: ICD-10-CM

## 2024-06-04 DIAGNOSIS — N13.8 BENIGN PROSTATIC HYPERPLASIA WITH LOWER URINARY TRACT SYMPMS: ICD-10-CM

## 2024-06-04 DIAGNOSIS — N43.3 HYDROCELE, UNSPECIFIED: ICD-10-CM

## 2024-06-04 DIAGNOSIS — R39.9 UNSPECIFIED SYMPTOMS AND SIGNS INVOLVING THE GENITOURINARY SYSTEM: ICD-10-CM

## 2024-06-04 LAB
BILIRUB UR QL STRIP: NORMAL
COLLECTION METHOD: NORMAL
GLUCOSE UR-MCNC: 500
HCG UR QL: 0.2 EU/DL
HGB UR QL STRIP.AUTO: NORMAL
KETONES UR-MCNC: NORMAL
LEUKOCYTE ESTERASE UR QL STRIP: NORMAL
NITRITE UR QL STRIP: NORMAL
PH UR STRIP: 5.5
PROT UR STRIP-MCNC: NORMAL
SP GR UR STRIP: 1.01

## 2024-06-04 PROCEDURE — 99214 OFFICE O/P EST MOD 30 MIN: CPT | Mod: 25

## 2024-06-04 PROCEDURE — G2211 COMPLEX E/M VISIT ADD ON: CPT

## 2024-06-04 PROCEDURE — 81003 URINALYSIS AUTO W/O SCOPE: CPT | Mod: QW

## 2024-07-09 ENCOUNTER — APPOINTMENT (OUTPATIENT)
Dept: CARDIOLOGY | Facility: CLINIC | Age: 69
End: 2024-07-09
Payer: MEDICARE

## 2024-07-09 ENCOUNTER — NON-APPOINTMENT (OUTPATIENT)
Age: 69
End: 2024-07-09

## 2024-07-09 PROCEDURE — 93295 DEV INTERROG REMOTE 1/2/MLT: CPT

## 2024-07-09 PROCEDURE — 93296 REM INTERROG EVL PM/IDS: CPT

## 2024-07-23 ENCOUNTER — APPOINTMENT (OUTPATIENT)
Dept: CARDIOLOGY | Facility: CLINIC | Age: 69
End: 2024-07-23
Payer: MEDICARE

## 2024-07-23 VITALS
DIASTOLIC BLOOD PRESSURE: 60 MMHG | SYSTOLIC BLOOD PRESSURE: 90 MMHG | HEIGHT: 66 IN | HEART RATE: 63 BPM | RESPIRATION RATE: 16 BRPM | BODY MASS INDEX: 28.12 KG/M2 | OXYGEN SATURATION: 96 % | WEIGHT: 175 LBS

## 2024-07-23 DIAGNOSIS — E78.5 HYPERLIPIDEMIA, UNSPECIFIED: ICD-10-CM

## 2024-07-23 DIAGNOSIS — I25.10 ATHEROSCLEROTIC HEART DISEASE OF NATIVE CORONARY ARTERY W/OUT ANGINA PECTORIS: ICD-10-CM

## 2024-07-23 DIAGNOSIS — E11.9 TYPE 2 DIABETES MELLITUS W/OUT COMPLICATIONS: ICD-10-CM

## 2024-07-23 DIAGNOSIS — I10 ESSENTIAL (PRIMARY) HYPERTENSION: ICD-10-CM

## 2024-07-23 PROCEDURE — 93000 ELECTROCARDIOGRAM COMPLETE: CPT

## 2024-07-23 PROCEDURE — G2211 COMPLEX E/M VISIT ADD ON: CPT

## 2024-07-23 PROCEDURE — 99214 OFFICE O/P EST MOD 30 MIN: CPT

## 2024-07-23 RX ORDER — LINACLOTIDE 145 UG/1
145 CAPSULE, GELATIN COATED ORAL
Refills: 0 | Status: ACTIVE | COMMUNITY
Start: 2024-07-23

## 2024-07-24 NOTE — HISTORY OF PRESENT ILLNESS
[FreeTextEntry1] : PARUL LEAHY is a 69-year-old male, with a PMHx significant for CAD s/p PCI of the LAD with late presentation MI and reduced LVEF, s/p AICD extraction due to pocket infection, s/p new ICD implant, HTN, HLD, DM, who presents today for a follow-up visit. BP today noted to be 90/60 mmHg. Patient offers no other complaints at this time.     Labs from 7/15/2024 reviewed:  Total Cholesterol: 113 HDL: 46 Triglycerides: 79 LDL: 51 Chol/HDLC Ratio: 2.5 Non-HDL-C: 67 BUN: 37 Cr: 1.64 EGFR: 45 BUN/Cr Ratio: 23 Hgb A1c: 6.6

## 2024-07-24 NOTE — DISCUSSION/SUMMARY
[EKG obtained to assist in diagnosis and management of assessed problem(s)] : EKG obtained to assist in diagnosis and management of assessed problem(s) [FreeTextEntry1] : Lab work reviewed and noted to be unremarkable.   CAD: The impression is coronary artery disease. Currently, the condition is stable. There are no changes in medication management. Continue current medical therapy. Other planned treatment includes dietary modification, an exercise regimen, and weight reduction.  HTN: The impression is hypertension. Given patient's low BP, discontinue Amlodipine 5 mg. Instructed to monitor BPs at home and maintain a BP diary. Follow up with BPs this week. Other planned treatments include an exercise regimen, weight loss, low sodium diet, and alcohol moderation.  HLD: The impression is hyperlipidemia. Currently, the condition is stable. LDL is 51. There are no changes in medication management. Continue current medical therapy. Other planned treatment includes diet modification, exercise, and weight loss.  DM: The impression is diabetes mellitus. There are no changes in medication management. Patient advised to continue taking medications as prescribed, closely monitor blood sugar at home, follow a diabetic diet, and follow up for continued monitoring.  Instructed to follow up in 6 months.  Plan was discussed with the patient.

## 2024-07-24 NOTE — CARDIOLOGY SUMMARY
[de-identified] : Treadmill Stress Echo - 12/20/2022: SUMMARY 1. Treadmill exercise Stress Echo Test. 2. Stage 1: Rest: Basal and mid anterior septum, apical lateral segment, and apex are abnormal. Stage 2: Impost: Basal and mid anterior septum, apical lateral segment, and apex are abnormal. 3. No evidence of induced ischemia. 4. Moderate baseline hypokinesis of the basal anteroseptal, mid anteroseptal, apical lateral and apical inferior left ventricular wall(s). 5. Moderate hypokinesis of the basal anteroseptal, mid anteroseptal and apical lateral left ventricular wall(s) during stress. 6. There is evidence of old anterior/anterolateral and apical infarction suggestive of prior LAD infarction. All other wall segments augment normally. No EKG changes suggestive of ischemia. ======================================================= [de-identified] : TTE - 02/16/2024: CONCLUSIONS: 1. Left ventricular cavity is normal. Left ventricular systolic function is severely decreased with an ejection fraction visually estimated at 30 to 35 %. 2. Multiple segmental abnormalities exist. See findings. 3. There is mild (grade 1) left ventricular diastolic dysfunction. 4. The right ventricle is not well visualized. 5. Right atrium was not well visualized. 6. Tricuspid valve was not well visualized. 7. Pulmonic valve was not well visualized. 8. No significant valvular disease. 9. Structurally normal mitral valve with normal leaflet excursion. 10. The aortic root and ascending aorta appear normal in size. 11. No pericardial effusion seen. 12. The interatrial septum appears intact. =======================================================

## 2024-07-24 NOTE — CARDIOLOGY SUMMARY
[de-identified] : Treadmill Stress Echo - 12/20/2022: SUMMARY 1. Treadmill exercise Stress Echo Test. 2. Stage 1: Rest: Basal and mid anterior septum, apical lateral segment, and apex are abnormal. Stage 2: Impost: Basal and mid anterior septum, apical lateral segment, and apex are abnormal. 3. No evidence of induced ischemia. 4. Moderate baseline hypokinesis of the basal anteroseptal, mid anteroseptal, apical lateral and apical inferior left ventricular wall(s). 5. Moderate hypokinesis of the basal anteroseptal, mid anteroseptal and apical lateral left ventricular wall(s) during stress. 6. There is evidence of old anterior/anterolateral and apical infarction suggestive of prior LAD infarction. All other wall segments augment normally. No EKG changes suggestive of ischemia. ======================================================= [de-identified] : TTE - 02/16/2024: CONCLUSIONS: 1. Left ventricular cavity is normal. Left ventricular systolic function is severely decreased with an ejection fraction visually estimated at 30 to 35 %. 2. Multiple segmental abnormalities exist. See findings. 3. There is mild (grade 1) left ventricular diastolic dysfunction. 4. The right ventricle is not well visualized. 5. Right atrium was not well visualized. 6. Tricuspid valve was not well visualized. 7. Pulmonic valve was not well visualized. 8. No significant valvular disease. 9. Structurally normal mitral valve with normal leaflet excursion. 10. The aortic root and ascending aorta appear normal in size. 11. No pericardial effusion seen. 12. The interatrial septum appears intact. =======================================================

## 2024-08-08 ENCOUNTER — APPOINTMENT (OUTPATIENT)
Dept: ELECTROPHYSIOLOGY | Facility: CLINIC | Age: 69
End: 2024-08-08

## 2024-08-08 PROCEDURE — 99214 OFFICE O/P EST MOD 30 MIN: CPT

## 2024-08-08 PROCEDURE — 93282 PRGRMG EVAL IMPLANTABLE DFB: CPT

## 2024-08-08 NOTE — CARDIOLOGY SUMMARY
[de-identified] : 04/10/2023: SR, 1st degree AV block   HR 63bpm [de-identified] : 12/20/2022 EST: no significant ST changes, no ischemia [de-identified] : 02/16/2024: 1. Left ventricular cavity is normal. Left ventricular systolic function is severely decreased with an ejection fraction visually estimated at 30 to 35 %. 2. Multiple segmental abnormalities exist. See findings. 3. There is mild (grade 1) left ventricular diastolic dysfunction. 4. The right ventricle is not well visualized. 5. Right atrium was not well visualized. 6. Tricuspid valve was not well visualized. 7. Pulmonic valve was not well visualized. 8. No significant valvular disease. 9. Structurally normal mitral valve with normal leaflet excursion. 10. The aortic root and ascending aorta appear normal in size. 11. No pericardial effusion seen. 12. The interatrial septum appears intact. [de-identified] : 03/07/2023: Single Chamber ICD ([employer]) implanted

## 2024-08-08 NOTE — PROCEDURE
[NSR] : normal sinus rhythm [See Device Printout] : See device printout [ICD] : Implantable cardioverter-defibrillator [VVI] : VVI [Charge Time: ___ sec] : charge time was [unfilled] seconds [Lead Imp:  ___ohms] : lead impedance was [unfilled] ohms [Sensing Amplitude ___mv] : sensing amplitude was [unfilled] mv [___V @] : [unfilled] V [___ ms] : [unfilled] ms [None] : none [Counters Reset] : the counters were reset [de-identified] : [place] [de-identified] : RESONATE EL ICD D432 [de-identified] : [id number] [de-identified] : 03/07/2023 [de-identified] : 41 [de-identified] : 13.5 years [de-identified] : No events  <1%

## 2024-08-08 NOTE — ASSESSMENT
[FreeTextEntry1] : # SC-ICD - Incision site well healed, clean, dry, no drainage, no redness, no bruising. The patient has no pain. - Device function normal. All parameters stable. No events. See Device Printout - Continue remote monitoring  # HFrEF (TTE 2/2024: LVEF 30-35%) - Recommend current medication regimen as per Dr. Rock  # HTN - BP at goal - Pt is keeping BP diary. Some BP readings have been "low" lately. Pt states systolic in the 90s at times and feels "dizzy". Educated to change positions slowly and drink 1-2 L daily. - Amlodipine 10mg QD was d/c'd by gen cards d/t low BP. - On Valsartan 80mg QD. Continue as directed. - F/U with gen cards   I have also advised the patient to go to the nearest emergency room if he experiences any chest pain, dyspnea, syncope, or has any other compelling symptoms.   RTO 7-9 months/PRN

## 2024-08-08 NOTE — HISTORY OF PRESENT ILLNESS
[de-identified] : 69y.o Male, with a PMHx of CAD s/p PCI of the LAD with late presentation MI and reduced LVEF, HTN, HLD, and DM, s/p AICD extraction due to pocket infection and new SC-ICD implant (3/7/2023).  Patient presents today for routine device interrogation.  Today the patient is feeling generally well with no acute complaints. Denies CP, SOB, palpitations, syncope. He reports feeling "dizzy" sometimes when his BP is too low.

## 2024-11-07 ENCOUNTER — APPOINTMENT (OUTPATIENT)
Dept: CARDIOLOGY | Facility: CLINIC | Age: 69
End: 2024-11-07

## 2024-11-07 PROCEDURE — 93296 REM INTERROG EVL PM/IDS: CPT

## 2024-11-07 PROCEDURE — 93295 DEV INTERROG REMOTE 1/2/MLT: CPT

## 2024-12-17 ENCOUNTER — APPOINTMENT (OUTPATIENT)
Dept: UROLOGY | Facility: CLINIC | Age: 69
End: 2024-12-17
Payer: MEDICARE

## 2024-12-17 VITALS
HEIGHT: 66 IN | TEMPERATURE: 97.9 F | DIASTOLIC BLOOD PRESSURE: 87 MMHG | SYSTOLIC BLOOD PRESSURE: 128 MMHG | HEART RATE: 62 BPM | OXYGEN SATURATION: 97 % | BODY MASS INDEX: 28.12 KG/M2 | RESPIRATION RATE: 16 BRPM | WEIGHT: 175 LBS

## 2024-12-17 DIAGNOSIS — R97.20 ELEVATED PROSTATE, SPECIFIC ANTIGEN [PSA]: ICD-10-CM

## 2024-12-17 DIAGNOSIS — N43.3 HYDROCELE, UNSPECIFIED: ICD-10-CM

## 2024-12-17 DIAGNOSIS — N13.8 BENIGN PROSTATIC HYPERPLASIA WITH LOWER URINARY TRACT SYMPMS: ICD-10-CM

## 2024-12-17 DIAGNOSIS — R39.9 UNSPECIFIED SYMPTOMS AND SIGNS INVOLVING THE GENITOURINARY SYSTEM: ICD-10-CM

## 2024-12-17 DIAGNOSIS — N40.1 BENIGN PROSTATIC HYPERPLASIA WITH LOWER URINARY TRACT SYMPMS: ICD-10-CM

## 2024-12-17 LAB
BILIRUB UR QL STRIP: NORMAL
COLLECTION METHOD: NORMAL
GLUCOSE UR-MCNC: 250
HCG UR QL: 0.2 EU/DL
HGB UR QL STRIP.AUTO: NORMAL
KETONES UR-MCNC: NORMAL
LEUKOCYTE ESTERASE UR QL STRIP: NORMAL
NITRITE UR QL STRIP: NORMAL
PH UR STRIP: 5.5
PROT UR STRIP-MCNC: NORMAL
SP GR UR STRIP: 1.02

## 2024-12-17 PROCEDURE — 99214 OFFICE O/P EST MOD 30 MIN: CPT | Mod: 25

## 2024-12-17 PROCEDURE — 81003 URINALYSIS AUTO W/O SCOPE: CPT | Mod: QW

## 2024-12-17 PROCEDURE — G2211 COMPLEX E/M VISIT ADD ON: CPT

## 2025-01-16 ENCOUNTER — APPOINTMENT (OUTPATIENT)
Dept: CARDIOLOGY | Facility: CLINIC | Age: 70
End: 2025-01-16
Payer: MEDICARE

## 2025-01-16 ENCOUNTER — NON-APPOINTMENT (OUTPATIENT)
Age: 70
End: 2025-01-16

## 2025-01-16 VITALS
BODY MASS INDEX: 27.64 KG/M2 | DIASTOLIC BLOOD PRESSURE: 80 MMHG | HEIGHT: 66 IN | SYSTOLIC BLOOD PRESSURE: 112 MMHG | HEART RATE: 65 BPM | OXYGEN SATURATION: 96 % | WEIGHT: 172 LBS

## 2025-01-16 DIAGNOSIS — I10 ESSENTIAL (PRIMARY) HYPERTENSION: ICD-10-CM

## 2025-01-16 DIAGNOSIS — E11.9 TYPE 2 DIABETES MELLITUS W/OUT COMPLICATIONS: ICD-10-CM

## 2025-01-16 DIAGNOSIS — I25.10 ATHEROSCLEROTIC HEART DISEASE OF NATIVE CORONARY ARTERY W/OUT ANGINA PECTORIS: ICD-10-CM

## 2025-01-16 DIAGNOSIS — E78.5 HYPERLIPIDEMIA, UNSPECIFIED: ICD-10-CM

## 2025-01-16 PROCEDURE — 93000 ELECTROCARDIOGRAM COMPLETE: CPT

## 2025-01-16 PROCEDURE — 99214 OFFICE O/P EST MOD 30 MIN: CPT

## 2025-01-16 PROCEDURE — G2211 COMPLEX E/M VISIT ADD ON: CPT

## 2025-02-06 ENCOUNTER — APPOINTMENT (OUTPATIENT)
Dept: CARDIOLOGY | Facility: CLINIC | Age: 70
End: 2025-02-06
Payer: MEDICARE

## 2025-02-06 ENCOUNTER — NON-APPOINTMENT (OUTPATIENT)
Age: 70
End: 2025-02-06

## 2025-02-06 PROCEDURE — 93296 REM INTERROG EVL PM/IDS: CPT

## 2025-02-06 PROCEDURE — 93295 DEV INTERROG REMOTE 1/2/MLT: CPT

## 2025-02-28 ENCOUNTER — EMERGENCY (EMERGENCY)
Facility: HOSPITAL | Age: 70
LOS: 0 days | Discharge: HOME | End: 2025-02-28
Attending: EMERGENCY MEDICINE

## 2025-02-28 VITALS
OXYGEN SATURATION: 95 % | SYSTOLIC BLOOD PRESSURE: 149 MMHG | HEIGHT: 65 IN | WEIGHT: 175.05 LBS | RESPIRATION RATE: 18 BRPM | HEART RATE: 78 BPM | TEMPERATURE: 98 F | DIASTOLIC BLOOD PRESSURE: 102 MMHG

## 2025-02-28 VITALS
RESPIRATION RATE: 18 BRPM | OXYGEN SATURATION: 96 % | HEART RATE: 64 BPM | DIASTOLIC BLOOD PRESSURE: 86 MMHG | SYSTOLIC BLOOD PRESSURE: 136 MMHG

## 2025-02-28 PROCEDURE — 99283 EMERGENCY DEPT VISIT LOW MDM: CPT | Mod: 25

## 2025-02-28 PROCEDURE — 10061 I&D ABSCESS COMP/MULTIPLE: CPT

## 2025-02-28 PROCEDURE — 10060 I&D ABSCESS SIMPLE/SINGLE: CPT

## 2025-02-28 RX ORDER — AMOXICILLIN AND CLAVULANATE POTASSIUM 200; 28.5 MG/5ML; MG/5ML
875 POWDER, FOR SUSPENSION ORAL
Qty: 14 | Refills: 0
Start: 2025-02-28 | End: 2025-03-06

## 2025-02-28 NOTE — ED PROVIDER NOTE - OBJECTIVE STATEMENT
Patient with history of hypertension, MI, diabetes, AICD presents with left-sided chest wall abscess that has been present for 2 days.  Patient states that he used to have his AICD located on that side but became infected so it was removed and replaced on his right chest wall.  Ever since then he has always had a small little bump to the left side chest wall.  He states about a week ago he may have bumped it and it became swollen and painful since.  Denies drainage, fevers or chills.  Topical antibiotic w/o improvement.

## 2025-02-28 NOTE — ED PROVIDER NOTE - PATIENT PORTAL LINK FT
You can access the FollowMyHealth Patient Portal offered by [facility] by registering at the following website: [email or web address]. By joining [facility]’s FollowMyHealth portal, you will also be able to view your health information using other applications (apps) compatible with our system.

## 2025-02-28 NOTE — ED PROVIDER NOTE - CLINICAL SUMMARY MEDICAL DECISION MAKING FREE TEXT BOX
No distress.  VSS.  Chest wall abscess at site of previous pacemaker removal scar.  I&D under local anesthesia.  DC home.  Packing out in 24-48 hours. Strict return instructions disucssed.  Rx Abx sent to pharmacy.

## 2025-02-28 NOTE — ED PROVIDER NOTE - PHYSICAL EXAMINATION
Left chest wall with tender fluctuant abscess inferior to old AICD scar.  No expanding cellulitis.  No drainage noted.  Abscess is approximately 4 cm x 3 cm Information could not be obtained

## 2025-02-28 NOTE — ED ADULT NURSE NOTE - NSFALLUNIVINTERV_ED_ALL_ED
Underweight
Bed/Stretcher in lowest position, wheels locked, appropriate side rails in place/Call bell, personal items and telephone in reach/Instruct patient to call for assistance before getting out of bed/chair/stretcher/Non-slip footwear applied when patient is off stretcher/[place] to call system/Physically safe environment - no spills, clutter or unnecessary equipment/Purposeful proactive rounding/Room/bathroom lighting operational, light cord in reach

## 2025-04-25 ENCOUNTER — NON-APPOINTMENT (OUTPATIENT)
Age: 70
End: 2025-04-25

## 2025-04-25 ENCOUNTER — APPOINTMENT (OUTPATIENT)
Facility: CLINIC | Age: 70
End: 2025-04-25
Payer: MEDICARE

## 2025-04-25 PROCEDURE — 92202 OPSCPY EXTND ON/MAC DRAW: CPT

## 2025-04-25 PROCEDURE — 99204 OFFICE O/P NEW MOD 45 MIN: CPT

## 2025-04-25 PROCEDURE — 92133 CPTRZD OPH DX IMG PST SGM ON: CPT

## 2025-05-12 ENCOUNTER — APPOINTMENT (OUTPATIENT)
Dept: ELECTROPHYSIOLOGY | Facility: CLINIC | Age: 70
End: 2025-05-12
Payer: MEDICARE

## 2025-05-12 ENCOUNTER — NON-APPOINTMENT (OUTPATIENT)
Age: 70
End: 2025-05-12

## 2025-05-12 VITALS
HEIGHT: 66 IN | BODY MASS INDEX: 28.12 KG/M2 | DIASTOLIC BLOOD PRESSURE: 80 MMHG | HEART RATE: 60 BPM | WEIGHT: 175 LBS | SYSTOLIC BLOOD PRESSURE: 132 MMHG

## 2025-05-12 DIAGNOSIS — Z45.02 ENCOUNTER FOR ADJUSTMENT AND MANAGEMENT OF AUTOMATIC IMPLANTABLE CARDIAC DEFIBRILLATOR: ICD-10-CM

## 2025-05-12 DIAGNOSIS — I10 ESSENTIAL (PRIMARY) HYPERTENSION: ICD-10-CM

## 2025-05-12 PROCEDURE — 93282 PRGRMG EVAL IMPLANTABLE DFB: CPT

## 2025-05-12 PROCEDURE — 99214 OFFICE O/P EST MOD 30 MIN: CPT

## 2025-06-18 ENCOUNTER — APPOINTMENT (OUTPATIENT)
Dept: UROLOGY | Facility: CLINIC | Age: 70
End: 2025-06-18
Payer: MEDICARE

## 2025-06-18 LAB
BILIRUB UR QL STRIP: NORMAL
COLLECTION METHOD: NORMAL
GLUCOSE UR-MCNC: NORMAL
HCG UR QL: 0.2 EU/DL
HGB UR QL STRIP.AUTO: NORMAL
KETONES UR-MCNC: NORMAL
LEUKOCYTE ESTERASE UR QL STRIP: NORMAL
NITRITE UR QL STRIP: NORMAL
PH UR STRIP: 5.5
PROT UR STRIP-MCNC: NORMAL
SP GR UR STRIP: 1.02

## 2025-06-18 PROCEDURE — 99214 OFFICE O/P EST MOD 30 MIN: CPT | Mod: 25

## 2025-06-18 PROCEDURE — 81003 URINALYSIS AUTO W/O SCOPE: CPT | Mod: QW

## 2025-07-16 ENCOUNTER — APPOINTMENT (OUTPATIENT)
Dept: CARDIOLOGY | Facility: CLINIC | Age: 70
End: 2025-07-16
Payer: MEDICARE

## 2025-07-16 VITALS
RESPIRATION RATE: 16 BRPM | WEIGHT: 176 LBS | DIASTOLIC BLOOD PRESSURE: 78 MMHG | HEART RATE: 57 BPM | SYSTOLIC BLOOD PRESSURE: 108 MMHG | OXYGEN SATURATION: 96 % | BODY MASS INDEX: 29.32 KG/M2 | HEIGHT: 65 IN

## 2025-07-16 PROBLEM — R09.89 BRUIT: Status: ACTIVE | Noted: 2025-07-16

## 2025-07-16 PROBLEM — I50.20 HFREF (HEART FAILURE WITH REDUCED EJECTION FRACTION): Status: ACTIVE | Noted: 2025-07-16

## 2025-07-16 PROCEDURE — G2211 COMPLEX E/M VISIT ADD ON: CPT

## 2025-07-16 PROCEDURE — 99214 OFFICE O/P EST MOD 30 MIN: CPT

## 2025-08-11 ENCOUNTER — NON-APPOINTMENT (OUTPATIENT)
Age: 70
End: 2025-08-11

## 2025-08-11 ENCOUNTER — APPOINTMENT (OUTPATIENT)
Dept: CARDIOLOGY | Facility: CLINIC | Age: 70
End: 2025-08-11
Payer: MEDICARE

## 2025-08-11 PROCEDURE — 93295 DEV INTERROG REMOTE 1/2/MLT: CPT

## 2025-08-11 PROCEDURE — 93296 REM INTERROG EVL PM/IDS: CPT

## 2025-08-15 ENCOUNTER — APPOINTMENT (OUTPATIENT)
Dept: CARDIOLOGY | Facility: CLINIC | Age: 70
End: 2025-08-15
Payer: MEDICARE

## 2025-08-15 PROCEDURE — 93880 EXTRACRANIAL BILAT STUDY: CPT

## 2025-08-15 PROCEDURE — 93306 TTE W/DOPPLER COMPLETE: CPT

## 2025-08-20 ENCOUNTER — APPOINTMENT (OUTPATIENT)
Dept: CARDIOLOGY | Facility: CLINIC | Age: 70
End: 2025-08-20

## 2025-08-20 ENCOUNTER — NON-APPOINTMENT (OUTPATIENT)
Age: 70
End: 2025-08-20

## 2025-08-20 PROCEDURE — 93306 TTE W/DOPPLER COMPLETE: CPT

## 2025-08-25 DIAGNOSIS — I51.3 INTRACARDIAC THROMBOSIS, NOT ELSEWHERE CLASSIFIED: ICD-10-CM

## 2025-08-25 RX ORDER — APIXABAN 5 MG/1
5 TABLET, FILM COATED ORAL
Qty: 180 | Refills: 3 | Status: ACTIVE | COMMUNITY
Start: 2025-08-25 | End: 1900-01-01